# Patient Record
Sex: FEMALE | Race: WHITE | HISPANIC OR LATINO | URBAN - METROPOLITAN AREA
[De-identification: names, ages, dates, MRNs, and addresses within clinical notes are randomized per-mention and may not be internally consistent; named-entity substitution may affect disease eponyms.]

---

## 2018-02-21 ENCOUNTER — EMERGENCY (EMERGENCY)
Facility: HOSPITAL | Age: 28
LOS: 0 days | Discharge: HOME | End: 2018-02-21

## 2018-02-21 VITALS
RESPIRATION RATE: 20 BRPM | SYSTOLIC BLOOD PRESSURE: 123 MMHG | HEART RATE: 120 BPM | TEMPERATURE: 103 F | DIASTOLIC BLOOD PRESSURE: 82 MMHG | OXYGEN SATURATION: 98 %

## 2018-02-21 VITALS
DIASTOLIC BLOOD PRESSURE: 59 MMHG | HEART RATE: 95 BPM | RESPIRATION RATE: 18 BRPM | OXYGEN SATURATION: 98 % | SYSTOLIC BLOOD PRESSURE: 125 MMHG | TEMPERATURE: 99 F

## 2018-02-21 DIAGNOSIS — Z88.0 ALLERGY STATUS TO PENICILLIN: ICD-10-CM

## 2018-02-21 DIAGNOSIS — J45.909 UNSPECIFIED ASTHMA, UNCOMPLICATED: ICD-10-CM

## 2018-02-21 DIAGNOSIS — R50.9 FEVER, UNSPECIFIED: ICD-10-CM

## 2018-02-21 DIAGNOSIS — R05 COUGH: ICD-10-CM

## 2018-02-21 DIAGNOSIS — M79.1 MYALGIA: ICD-10-CM

## 2018-02-21 DIAGNOSIS — R19.7 DIARRHEA, UNSPECIFIED: ICD-10-CM

## 2018-02-21 DIAGNOSIS — Z91.018 ALLERGY TO OTHER FOODS: ICD-10-CM

## 2018-02-21 DIAGNOSIS — R11.2 NAUSEA WITH VOMITING, UNSPECIFIED: ICD-10-CM

## 2018-02-21 DIAGNOSIS — J02.9 ACUTE PHARYNGITIS, UNSPECIFIED: ICD-10-CM

## 2018-02-21 RX ORDER — SODIUM CHLORIDE 9 MG/ML
2000 INJECTION INTRAMUSCULAR; INTRAVENOUS; SUBCUTANEOUS ONCE
Qty: 0 | Refills: 0 | Status: COMPLETED | OUTPATIENT
Start: 2018-02-21 | End: 2018-02-21

## 2018-02-21 RX ORDER — ACETAMINOPHEN 500 MG
650 TABLET ORAL ONCE
Qty: 0 | Refills: 0 | Status: COMPLETED | OUTPATIENT
Start: 2018-02-21 | End: 2018-02-21

## 2018-02-21 RX ORDER — KETOROLAC TROMETHAMINE 30 MG/ML
30 SYRINGE (ML) INJECTION ONCE
Qty: 0 | Refills: 0 | Status: DISCONTINUED | OUTPATIENT
Start: 2018-02-21 | End: 2018-02-21

## 2018-02-21 RX ADMIN — Medication 30 MILLIGRAM(S): at 21:09

## 2018-02-21 RX ADMIN — SODIUM CHLORIDE 2000 MILLILITER(S): 9 INJECTION INTRAMUSCULAR; INTRAVENOUS; SUBCUTANEOUS at 21:09

## 2018-02-21 RX ADMIN — Medication 650 MILLIGRAM(S): at 20:36

## 2018-02-21 NOTE — ED PROVIDER NOTE - MEDICAL DECISION MAKING DETAILS
pt with influenza like symptoms, tamiflu offered and pt refused, close follow up discussed, pt improved with treatment in the ED

## 2018-02-21 NOTE — ED PROVIDER NOTE - OBJECTIVE STATEMENT
Pertinent PMH/PSH/FHx/SHx and Review of Systems contained within:  Patient presents to the ED for evaluation of generalized body aches, fever, cough, sore throat that started today. Also admits to nausea, vomiting or diarrhea. No abd pain, no sick contact, no recent travel.   Patient denies EtOH/tobacco/illicit substance use.  Relevant PMHx/SHx/SOCHx/FAMH:  Asthma  Review of Systems  Constitutional: (+) fever  Cardiovascular: (-) chest pain  Respiratory: (-) cough  Gastrointestinal: (+) vomiting, (+) diarrhea  Integumentary: (-) rash  Neurological: (+) headache  PE  Gen: Alert, NAD, well appearing  Head: NC, AT, PERRL, EOMI, normal lids/conjunctiva  ENT: normal hearing, patent oropharynx mild erythema no exuadate no LAD  Neck: +supple, no meningismus  Pulm: Bilateral BS, normal resp effort, no wheeze/stridor/retractions  CV: RRR, no M/R/G, +dist pulses  Abd: soft, NT/ND, +BS, no hepatosplenomegaly

## 2018-05-22 ENCOUNTER — APPOINTMENT (OUTPATIENT)
Dept: OBGYN | Facility: CLINIC | Age: 28
End: 2018-05-22

## 2018-11-01 NOTE — ED ADULT TRIAGE NOTE - SOURCE OF INFORMATION
Patient was seen by Danielle Munoz last Friday 10/26/18. She is still coughing and feels like theres tightness in her chest. Wondering if something could be called in. Please advise. Luis Angel Camara. Patient/EMS

## 2019-01-05 ENCOUNTER — EMERGENCY (EMERGENCY)
Facility: HOSPITAL | Age: 29
LOS: 0 days | Discharge: HOME | End: 2019-01-05
Attending: EMERGENCY MEDICINE | Admitting: PHYSICIAN ASSISTANT

## 2019-01-05 VITALS
HEART RATE: 68 BPM | DIASTOLIC BLOOD PRESSURE: 87 MMHG | TEMPERATURE: 98 F | SYSTOLIC BLOOD PRESSURE: 130 MMHG | RESPIRATION RATE: 20 BRPM | OXYGEN SATURATION: 99 %

## 2019-01-05 DIAGNOSIS — K08.89 OTHER SPECIFIED DISORDERS OF TEETH AND SUPPORTING STRUCTURES: ICD-10-CM

## 2019-01-05 DIAGNOSIS — F17.290 NICOTINE DEPENDENCE, OTHER TOBACCO PRODUCT, UNCOMPLICATED: ICD-10-CM

## 2019-01-05 RX ORDER — IBUPROFEN 200 MG
600 TABLET ORAL ONCE
Qty: 0 | Refills: 0 | Status: COMPLETED | OUTPATIENT
Start: 2019-01-05 | End: 2019-01-05

## 2019-01-05 RX ORDER — IBUPROFEN 200 MG
1 TABLET ORAL
Qty: 21 | Refills: 0
Start: 2019-01-05 | End: 2019-01-11

## 2019-01-05 RX ADMIN — Medication 600 MILLIGRAM(S): at 09:49

## 2019-01-05 NOTE — ED PROVIDER NOTE - OBJECTIVE STATEMENT
27 y/o female presents to the ED c/o "I have severe left upper dental pain since last night. My tooth cracked awhile ago." no fever/ chills/ difficulty swallowing.

## 2019-01-05 NOTE — ED ADULT NURSE NOTE - OBJECTIVE STATEMENT
Pt states she broke her right upper tooth months ago but did not have pain. Pt states she was eating sandwich last night and has been having pain since.

## 2019-01-05 NOTE — CONSULT NOTE ADULT - SUBJECTIVE AND OBJECTIVE BOX
Patient reports delaying dental treatment as she is afraid of needles. Patient had been aware her teeth were fractured but since they did not cause pain she delayed seeking dental care.

## 2019-01-05 NOTE — CONSULT NOTE ADULT - ASSESSMENT
Reviewed medical history with patient. Patient reports penicillin allergy with resulting full body rash.    Clinical exam: Patient have generalized calculus, visible cavitation on teeth #19 and #30, and fractured tooth #15 remaining lingual cusp visible.   Emergency Plan: extraction of tooth #15    Procedure: Explained to patient that tooth #15 requires extraction, but could not be done in the emergency room. Explained that as it was before 10 am there would be open dental clinics and advised patient to seek dental care today. Assisted patient in navigating her insurance website and locating a dentist that accepts her insurance. Explained to patient that teeth #19 and #30 despite being visibly cavitated may be saved with root canal treatment pending radiographs. Advised patient to seek routine care as she has several dental needs that should be addressed. Explained risks of delaying dental care would result in infections manifesting as intraoral abscesses and visible extra oral swellings. Administered 3 carpules of 2% lidocaine 1:100,000 epinephrine via buccal and palatal local infiltration.      Prescriptions: Clindamycin 150 mg 4 times per day for 7 days

## 2019-01-05 NOTE — ED ADULT NURSE NOTE - NSIMPLEMENTINTERV_GEN_ALL_ED
Implemented All Universal Safety Interventions:  [place] to call system. Call bell, personal items and telephone within reach. Instruct patient to call for assistance. Room bathroom lighting operational. Non-slip footwear when patient is off stretcher. Physically safe environment: no spills, clutter or unnecessary equipment. Stretcher in lowest position, wheels locked, appropriate side rails in place.

## 2019-10-14 ENCOUNTER — TRANSCRIPTION ENCOUNTER (OUTPATIENT)
Age: 29
End: 2019-10-14

## 2019-12-13 ENCOUNTER — EMERGENCY (EMERGENCY)
Facility: HOSPITAL | Age: 29
LOS: 0 days | Discharge: HOME | End: 2019-12-13
Admitting: EMERGENCY MEDICINE
Payer: MEDICAID

## 2019-12-13 VITALS
HEART RATE: 97 BPM | TEMPERATURE: 99 F | DIASTOLIC BLOOD PRESSURE: 66 MMHG | RESPIRATION RATE: 16 BRPM | SYSTOLIC BLOOD PRESSURE: 146 MMHG | OXYGEN SATURATION: 99 %

## 2019-12-13 DIAGNOSIS — L02.91 CUTANEOUS ABSCESS, UNSPECIFIED: ICD-10-CM

## 2019-12-13 DIAGNOSIS — Z91.018 ALLERGY TO OTHER FOODS: ICD-10-CM

## 2019-12-13 DIAGNOSIS — K61.1 RECTAL ABSCESS: ICD-10-CM

## 2019-12-13 DIAGNOSIS — Z88.0 ALLERGY STATUS TO PENICILLIN: ICD-10-CM

## 2019-12-13 PROCEDURE — 99283 EMERGENCY DEPT VISIT LOW MDM: CPT | Mod: 25

## 2019-12-13 PROCEDURE — 46040 I&D ISCHIORCT&/PERIRCT ABSC: CPT

## 2019-12-13 RX ORDER — CEPHALEXIN 500 MG
1 CAPSULE ORAL
Qty: 28 | Refills: 0
Start: 2019-12-13 | End: 2019-12-19

## 2019-12-13 RX ORDER — AZTREONAM 2 G
1 VIAL (EA) INJECTION
Qty: 14 | Refills: 0
Start: 2019-12-13 | End: 2019-12-19

## 2019-12-13 NOTE — ED PROVIDER NOTE - CARE PROVIDER_API CALL
Pepe Wood)  Dermatology; Internal Medicine  [address] [place] [address]  Phone: [phone]  Fax: [phone]  Follow Up Time:

## 2019-12-13 NOTE — ED PROVIDER NOTE - OBJECTIVE STATEMENT
29 year old F c/o abscess to rectal area x 2 days. Denies any fever/chills, drainage, constipation, diarrhea, rectal bleeding, abdominal pain, ,nausea, vomiting.

## 2019-12-13 NOTE — ED PROVIDER NOTE - PHYSICAL EXAMINATION
CONSTITUTIONAL: Well-appearing; well-nourished; in no apparent distress.   NECK: Supple; non-tender; no cervical lymphadenopathy.    CARDIOVASCULAR: Normal S1, S2; no murmurs, rubs, or gallops.   RESPIRATORY: Normal chest excursion with respiration; breath sounds clear and equal bilaterally; no wheezes, rhonchi, or rales.  GI/: Normal bowel sounds; non-distended; non-tender; no palpable organomegaly.   Exam chaperoned by DIANA Bedoya: + small fluctuant perirectal abscess noted at 9 o clock. no drainage . Pt refused rectal exam  MS: No evidence of trauma or deformity. Normal ROM in all four extremities; non-tender to palpation; distal pulses are normal.   SKIN: Normal for age and race; warm; dry; good turgor; no apparent lesions or exudate.   NEURO/PSYCH: A & O x 4; grossly unremarkable. mood and manner are appropriate.

## 2019-12-13 NOTE — ED PROVIDER NOTE - NS ED ROS FT
Constitutional: no fever, chills, no recent weight loss, change in appetite or malaise  Cardiac: No chest pain, SOB or edema.  Respiratory: No cough or respiratory distress  GI: No nausea, vomiting, diarrhea or abdominal pain  : No dysuria, frequency, urgency or hematuria. + perirectal abscess  MS: no pain to back or extremities, no loss of ROM, no weakness  Neuro: No headache or weakness. No LOC.  Skin: No skin rash.  Endocrine: No history of thyroid disease or diabetes.  Except as documented in the HPI, all other systems are negative.

## 2019-12-13 NOTE — ED PROVIDER NOTE - PATIENT PORTAL LINK FT
You can access the FollowMyHealth Patient Portal offered by [facility] by registering at the following website: [email or web address]. By joining [employer]’s FollowMyHealth portal, you will also be able to view your health information using other applications (apps) compatible with our system.

## 2020-11-20 ENCOUNTER — APPOINTMENT (OUTPATIENT)
Dept: SURGERY | Facility: CLINIC | Age: 30
End: 2020-11-20
Payer: MEDICAID

## 2020-11-20 VITALS
BODY MASS INDEX: 41.48 KG/M2 | SYSTOLIC BLOOD PRESSURE: 142 MMHG | TEMPERATURE: 97.5 F | HEIGHT: 70.5 IN | DIASTOLIC BLOOD PRESSURE: 86 MMHG | WEIGHT: 293 LBS

## 2020-11-20 DIAGNOSIS — Z82.49 FAMILY HISTORY OF ISCHEMIC HEART DISEASE AND OTHER DISEASES OF THE CIRCULATORY SYSTEM: ICD-10-CM

## 2020-11-20 PROCEDURE — 99204 OFFICE O/P NEW MOD 45 MIN: CPT

## 2020-11-20 RX ORDER — ETONOGESTREL 68 MG/1
IMPLANT SUBCUTANEOUS
Refills: 0 | Status: ACTIVE | COMMUNITY

## 2020-11-20 RX ORDER — ETONOGESTREL 68 MG/1
IMPLANT SUBCUTANEOUS
Refills: 0 | Status: DISCONTINUED | COMMUNITY
End: 2020-11-20

## 2020-11-20 RX ORDER — CALCIUM CARBONATE 500 MG/1
TABLET, CHEWABLE ORAL
Refills: 0 | Status: DISCONTINUED | COMMUNITY
End: 2020-11-20

## 2020-11-20 RX ORDER — CALCIUM CARBONATE 500 MG/1
TABLET, CHEWABLE ORAL
Refills: 0 | Status: ACTIVE | COMMUNITY

## 2020-11-23 NOTE — ASSESSMENT
[FreeTextEntry1] : BRIAN JOHNSTON is a 30 year female seen today for Bariatric Consultation. The surgical options for weight loss have been extensively discussed with the patient and questions answered. The patient was provided written and verbal education regarding the Sleeve Gastrectomy. The patient appears to have a reasonable understanding of the process and is ready to proceed.  Risks, including but not limited to:  anesthesia, death, bleeding, infection, leaks, blood clots, ulcers, malnutrition and need for additional operations have been reviewed.  The importance of a consistent diet and exercise regimen in regards to weight loss and maintenance has also been discussed and the patient agrees to actively participate in the process.  The importance of lifelong mineral and vitamin supplementation was also reviewed with the patient. The need to adhere to an appropriate diet and exercise regimen were emphasized; in particular the need to perform moderate to intense physical activity most days of the week.  No promises have been made in regards to any given outcome and possibility of inadequate weight loss as well as regain has been discussed with the patient.  The patient understands that a long-term commitment is necessary for long-term success.\par

## 2020-11-23 NOTE — PHYSICAL EXAM
[Normal] : affect appropriate [de-identified] : Mallampati III [de-identified] : Soft, nondistended.

## 2020-11-23 NOTE — CONSULT LETTER
[Dear  ___] : Dear  [unfilled], [Courtesy Letter:] : I had the pleasure of seeing your patient, [unfilled], in my office today. [Please see my note below.] : Please see my note below. [Consult Closing:] : Thank you very much for allowing me to participate in the care of this patient.  If you have any questions, please do not hesitate to contact me. [Sincerely,] : Sincerely, [FreeTextEntry3] : Eun Martinez MD, FACS, FASMBS, Diplo ABOM\par Bariatric, Foregut & Minimally Invasive Surgery\par Associate Medical Director, Quality\par [facility] \par [facility]\par

## 2020-11-23 NOTE — PLAN
[FreeTextEntry1] : Plan: Education class.\par          Nutrition evaluation.\par          Call with concerns.

## 2020-11-23 NOTE — HISTORY OF PRESENT ILLNESS
[de-identified] : 30 year old female with a BMI of 50 who presents today for Bariatric Consultation in consideration for the Sleeve Gastrectomy. She has tried multiple weight loss modalities in the past such as portion control, Herbalife/Shakeology and Intermittent Fasting without any long term success.She has been obese for several years and is seeking surgery for improvement of her overall health, comorbid conditions and to mitigate the impact of future medical comorbidities. Her biggest concern is that she will not be around to care for her 3 children.She enjoyed exercise in the past and is trying to dedicate sometime 2 days/week to walk for 15 minutes. Discussed the importance of regular scheduled exercise to optimize weight loss. \par \par \par

## 2020-11-30 ENCOUNTER — RESULT REVIEW (OUTPATIENT)
Age: 30
End: 2020-11-30

## 2020-11-30 ENCOUNTER — OUTPATIENT (OUTPATIENT)
Dept: OUTPATIENT SERVICES | Facility: HOSPITAL | Age: 30
LOS: 1 days | Discharge: HOME | End: 2020-11-30
Payer: MEDICAID

## 2020-11-30 DIAGNOSIS — E66.9 OBESITY, UNSPECIFIED: ICD-10-CM

## 2020-11-30 PROCEDURE — 76700 US EXAM ABDOM COMPLETE: CPT | Mod: 26

## 2020-12-02 ENCOUNTER — APPOINTMENT (OUTPATIENT)
Dept: SURGERY | Facility: CLINIC | Age: 30
End: 2020-12-02

## 2020-12-07 ENCOUNTER — NON-APPOINTMENT (OUTPATIENT)
Age: 30
End: 2020-12-07

## 2020-12-07 DIAGNOSIS — E55.9 VITAMIN D DEFICIENCY, UNSPECIFIED: ICD-10-CM

## 2020-12-07 LAB
25(OH)D3 SERPL-MCNC: 15 NG/ML
ALBUMIN SERPL ELPH-MCNC: 4 G/DL
ALP BLD-CCNC: 70 U/L
ALT SERPL-CCNC: 33 U/L
ANION GAP SERPL CALC-SCNC: 19 MMOL/L
AST SERPL-CCNC: 20 U/L
BASOPHILS # BLD AUTO: 0.03 K/UL
BASOPHILS NFR BLD AUTO: 0.4 %
BILIRUB SERPL-MCNC: 0.3 MG/DL
BUN SERPL-MCNC: 13 MG/DL
CALCIUM SERPL-MCNC: 8.8 MG/DL
CALCIUM SERPL-MCNC: 9 MG/DL
CHLORIDE SERPL-SCNC: 103 MMOL/L
CHOLEST SERPL-MCNC: 175 MG/DL
CO2 SERPL-SCNC: 18 MMOL/L
CREAT SERPL-MCNC: 0.8 MG/DL
EOSINOPHIL # BLD AUTO: 0.09 K/UL
EOSINOPHIL NFR BLD AUTO: 1.2 %
ESTIMATED AVERAGE GLUCOSE: 154 MG/DL
FERRITIN SERPL-MCNC: 65 NG/ML
FOLATE RBC-MCNC: 782 NG/ML
GLUCOSE SERPL-MCNC: 137 MG/DL
HBA1C MFR BLD HPLC: 7 %
HCT VFR BLD CALC: 37.6 %
HCT VFR BLD CALC: 37.6 %
HDLC SERPL-MCNC: 47 MG/DL
HGB BLD-MCNC: 12.5 G/DL
IMM GRANULOCYTES NFR BLD AUTO: 0.3 %
IRON SATN MFR SERPL: 15 %
IRON SERPL-MCNC: 52 UG/DL
LDLC SERPL CALC-MCNC: 113 MG/DL
LYMPHOCYTES # BLD AUTO: 2.61 K/UL
LYMPHOCYTES NFR BLD AUTO: 35.9 %
MAN DIFF?: NORMAL
MCHC RBC-ENTMCNC: 27.7 PG
MCHC RBC-ENTMCNC: 33.2 G/DL
MCV RBC AUTO: 83.4 FL
MONOCYTES # BLD AUTO: 0.38 K/UL
MONOCYTES NFR BLD AUTO: 5.2 %
NEUTROPHILS # BLD AUTO: 4.15 K/UL
NEUTROPHILS NFR BLD AUTO: 57 %
NONHDLC SERPL-MCNC: 128 MG/DL
PARATHYROID HORMONE INTACT: 32 PG/ML
PLATELET # BLD AUTO: 223 K/UL
POTASSIUM SERPL-SCNC: 4.3 MMOL/L
PROT SERPL-MCNC: 7.4 G/DL
RBC # BLD: 4.51 M/UL
RBC # FLD: 13.4 %
SODIUM SERPL-SCNC: 140 MMOL/L
T3FREE SERPL-MCNC: 3.72 PG/ML
T4 FREE SERPL-MCNC: 1.1 NG/DL
TIBC SERPL-MCNC: 340 UG/DL
TRIGL SERPL-MCNC: 102 MG/DL
TSH SERPL-ACNC: 3.2 UIU/ML
UIBC SERPL-MCNC: 288 UG/DL
VIT B1 SERPL-MCNC: 115.5 NMOL/L
VIT B12 SERPL-MCNC: 336 PG/ML
WBC # FLD AUTO: 7.28 K/UL

## 2020-12-09 ENCOUNTER — APPOINTMENT (OUTPATIENT)
Dept: SURGERY | Facility: CLINIC | Age: 30
End: 2020-12-09

## 2020-12-16 ENCOUNTER — APPOINTMENT (OUTPATIENT)
Dept: SURGERY | Facility: CLINIC | Age: 30
End: 2020-12-16
Payer: SELF-PAY

## 2020-12-16 PROCEDURE — SI006: CPT | Mod: NC

## 2020-12-17 ENCOUNTER — APPOINTMENT (OUTPATIENT)
Dept: SURGERY | Facility: CLINIC | Age: 30
End: 2020-12-17

## 2021-01-04 ENCOUNTER — TRANSCRIPTION ENCOUNTER (OUTPATIENT)
Age: 31
End: 2021-01-04

## 2021-01-12 ENCOUNTER — OUTPATIENT (OUTPATIENT)
Dept: OUTPATIENT SERVICES | Facility: HOSPITAL | Age: 31
LOS: 1 days | Discharge: HOME | End: 2021-01-12

## 2021-01-12 DIAGNOSIS — F50.9 EATING DISORDER, UNSPECIFIED: ICD-10-CM

## 2021-01-14 DIAGNOSIS — F50.9 EATING DISORDER, UNSPECIFIED: ICD-10-CM

## 2021-01-27 ENCOUNTER — APPOINTMENT (OUTPATIENT)
Dept: SURGERY | Facility: CLINIC | Age: 31
End: 2021-01-27
Payer: MEDICAID

## 2021-01-27 VITALS — WEIGHT: 293 LBS | BODY MASS INDEX: 41.48 KG/M2 | HEIGHT: 70.5 IN

## 2021-01-27 PROCEDURE — ZZZZZ: CPT

## 2021-02-16 ENCOUNTER — APPOINTMENT (OUTPATIENT)
Dept: SURGERY | Facility: CLINIC | Age: 31
End: 2021-02-16
Payer: MEDICAID

## 2021-02-16 VITALS — HEIGHT: 70.5 IN

## 2021-02-16 PROCEDURE — ZZZZZ: CPT

## 2021-02-23 ENCOUNTER — NON-APPOINTMENT (OUTPATIENT)
Age: 31
End: 2021-02-23

## 2021-03-29 ENCOUNTER — APPOINTMENT (OUTPATIENT)
Dept: SURGERY | Facility: CLINIC | Age: 31
End: 2021-03-29
Payer: MEDICAID

## 2021-03-29 VITALS — WEIGHT: 293 LBS | BODY MASS INDEX: 41.48 KG/M2 | HEIGHT: 70.5 IN

## 2021-03-29 PROCEDURE — ZZZZZ: CPT

## 2021-04-12 ENCOUNTER — TRANSCRIPTION ENCOUNTER (OUTPATIENT)
Age: 31
End: 2021-04-12

## 2021-04-26 ENCOUNTER — APPOINTMENT (OUTPATIENT)
Dept: SURGERY | Facility: CLINIC | Age: 31
End: 2021-04-26
Payer: MEDICAID

## 2021-04-26 VITALS — HEIGHT: 70.5 IN | BODY MASS INDEX: 41.48 KG/M2 | WEIGHT: 293 LBS

## 2021-04-26 PROCEDURE — ZZZZZ: CPT

## 2021-04-30 ENCOUNTER — NON-APPOINTMENT (OUTPATIENT)
Age: 31
End: 2021-04-30

## 2021-05-01 ENCOUNTER — OUTPATIENT (OUTPATIENT)
Dept: OUTPATIENT SERVICES | Facility: HOSPITAL | Age: 31
LOS: 1 days | End: 2021-05-01
Payer: MEDICAID

## 2021-05-09 ENCOUNTER — EMERGENCY (EMERGENCY)
Facility: HOSPITAL | Age: 31
LOS: 0 days | Discharge: HOME | End: 2021-05-10
Attending: EMERGENCY MEDICINE | Admitting: EMERGENCY MEDICINE
Payer: MEDICAID

## 2021-05-09 VITALS
OXYGEN SATURATION: 96 % | DIASTOLIC BLOOD PRESSURE: 75 MMHG | RESPIRATION RATE: 20 BRPM | SYSTOLIC BLOOD PRESSURE: 144 MMHG | WEIGHT: 293 LBS | TEMPERATURE: 97 F | HEART RATE: 84 BPM

## 2021-05-09 VITALS
HEART RATE: 85 BPM | DIASTOLIC BLOOD PRESSURE: 60 MMHG | OXYGEN SATURATION: 98 % | TEMPERATURE: 99 F | SYSTOLIC BLOOD PRESSURE: 109 MMHG | RESPIRATION RATE: 20 BRPM

## 2021-05-09 DIAGNOSIS — Z88.0 ALLERGY STATUS TO PENICILLIN: ICD-10-CM

## 2021-05-09 DIAGNOSIS — J45.909 UNSPECIFIED ASTHMA, UNCOMPLICATED: ICD-10-CM

## 2021-05-09 DIAGNOSIS — L02.31 CUTANEOUS ABSCESS OF BUTTOCK: ICD-10-CM

## 2021-05-09 DIAGNOSIS — Z91.018 ALLERGY TO OTHER FOODS: ICD-10-CM

## 2021-05-09 PROCEDURE — 99283 EMERGENCY DEPT VISIT LOW MDM: CPT

## 2021-05-09 NOTE — ED PROVIDER NOTE - OBJECTIVE STATEMENT
31 year old female with pmhx of abscesses presents with swelling to right buttock x 3 months. pt denies fever or chills .

## 2021-05-09 NOTE — ED PROVIDER NOTE - ATTENDING CONTRIBUTION TO CARE
32 y/o female with h/o abscesses in ER with c/o 'lump' to R buttocks for the past 3 months.  has not been getting larger, no drainage from site.  no f/c.   + painful to sit on. normal bm/urination.  no vaginal pain/abnormal discharge.  no abd pain.  no n/v/d.  no cp/sob  PE - nad, nc/at, cta b/l, no w/r/r, abd - soft, nt/nd, nabs, gu - normal ext genitalia, R buttock with ~ 1cm fluctuant mass palpated, no overlying erythema, no drainage.  -I&D

## 2021-05-09 NOTE — ED PROVIDER NOTE - NS ED ROS FT
Review of Systems:  	•	CONSTITUTIONAL - no fever, no diaphoresis, no chills  	•	SKIN - no rash  	•	HEMATOLOGIC - no bleeding, no bruising  	•	CARDIAC - no chest pain, no palpitations  	•	GI - no abd pain, no nausea, no vomiting, no diarrhea, no constipation  	•	GENITO-URINARY - no discharge, no dysuria; no hematuria, no increased urinary frequency  	•	MUSCULOSKELETAL - no joint paint, no swelling, no redness

## 2021-05-09 NOTE — ED PROVIDER NOTE - PHYSICAL EXAMINATION
CONST: Well appearing in NAD  EYES: PERRL, EOMI, Sclera and conjunctiva clear.   CARD: Normal S1 S2; Normal rate and rhythm  RESP: Equal BS B/L, No wheezes, rhonchi or rales. No distress  GI: Soft, non-tender, non-distended. no cva tenderness. normal BS  MS: Normal ROM in all extremities.   SKIN: small coin shaped area of fluctuance to right inner buttock, Warm, dry, no acute rashes. Good turgor

## 2021-05-09 NOTE — ED PROVIDER NOTE - NSFOLLOWUPCLINICS_GEN_ALL_ED_FT
[facility]  OB/GYN  [address] [place] [address]  Phone: [phone]  Fax:     [facility]  Surgery  [address] [place] [address]  Phone: [phone]  Fax:

## 2021-05-09 NOTE — ED PROVIDER NOTE - CLINICAL SUMMARY MEDICAL DECISION MAKING FREE TEXT BOX
I&D attempted, but only bloody fluid expressed.  to try warm compresses, po abx, and pt to f/u with surgery as outpt.

## 2021-05-09 NOTE — ED PROVIDER NOTE - PATIENT PORTAL LINK FT
You can access the FollowMyHealth Patient Portal offered by [facility] by registering at the following website: [email or web address]. By joining [employer]’s FollowMyHealth portal, you will also be able to view your health information using other applications (apps) compatible with our system.

## 2021-05-12 ENCOUNTER — APPOINTMENT (OUTPATIENT)
Dept: SURGERY | Facility: CLINIC | Age: 31
End: 2021-05-12
Payer: MEDICAID

## 2021-05-12 DIAGNOSIS — Z71.89 OTHER SPECIFIED COUNSELING: ICD-10-CM

## 2021-05-12 PROCEDURE — 99212 OFFICE O/P EST SF 10 MIN: CPT | Mod: 95

## 2021-05-12 NOTE — HISTORY OF PRESENT ILLNESS
[Home] : at home, [unfilled] , at the time of the visit. [Medical Office: ([facility])___] : at the medical office located in  [Verbal consent obtained from patient] : the patient, [unfilled] [de-identified] : Patient currently in the process of preoperative assessment for Sleeve Gastrectomy.  Upon initial consult, patient did report that she did experience reflux in when she ate certain foods "with sauces".  Currently, she reports that since she changed her diet to follow the recommendations of the dietician, she has not experienced any heartburn/reflux.  Our encounter today is to discuss the results recently received from an EGD performed in Jan 2021.

## 2021-05-12 NOTE — ASSESSMENT
[FreeTextEntry1] : Results of the EGD biopsy discussed with patient.  The risks of a Sleeve potentially increasing the severity of reflux with potential Ruiz's and progression to cancer were discussed with the patient.  Esophagitis is not an absolute contraindication to proceeding with the Sleeve but my concerns were discussed with the patient.  She has not followed-up with Dr. Minor since the EGD and was not aware of these findings.  I suggested that she contact Dr. Minor and discuss the findings, we will also reach out to Dr. Minor.  At this time, the patient wishes to proceed with the Sleeve Gastrectomy.  After discussing with Dr. Minor, we will f/u again to finalize our plan.  Patient voiced understanding and agreed.

## 2021-05-12 NOTE — PHYSICAL EXAM
[Obese, well nourished, in no acute distress] : obese, well nourished, in no acute distress [Normal] : affect appropriate [de-identified] : No increased respiratory effort

## 2021-06-04 ENCOUNTER — NON-APPOINTMENT (OUTPATIENT)
Age: 31
End: 2021-06-04

## 2021-06-04 ENCOUNTER — OUTPATIENT (OUTPATIENT)
Dept: OUTPATIENT SERVICES | Facility: HOSPITAL | Age: 31
LOS: 1 days | Discharge: HOME | End: 2021-06-04
Payer: MEDICAID

## 2021-06-04 VITALS
OXYGEN SATURATION: 98 % | SYSTOLIC BLOOD PRESSURE: 136 MMHG | RESPIRATION RATE: 18 BRPM | HEIGHT: 72 IN | TEMPERATURE: 98 F | HEART RATE: 89 BPM | WEIGHT: 293 LBS | DIASTOLIC BLOOD PRESSURE: 82 MMHG

## 2021-06-04 DIAGNOSIS — Z01.818 ENCOUNTER FOR OTHER PREPROCEDURAL EXAMINATION: ICD-10-CM

## 2021-06-04 DIAGNOSIS — E66.01 MORBID (SEVERE) OBESITY DUE TO EXCESS CALORIES: ICD-10-CM

## 2021-06-04 DIAGNOSIS — Z98.890 OTHER SPECIFIED POSTPROCEDURAL STATES: Chronic | ICD-10-CM

## 2021-06-04 LAB
A1C WITH ESTIMATED AVERAGE GLUCOSE RESULT: 7.6 % — HIGH (ref 4–5.6)
ALBUMIN SERPL ELPH-MCNC: 4 G/DL — SIGNIFICANT CHANGE UP (ref 3.5–5.2)
ALP SERPL-CCNC: 77 U/L — SIGNIFICANT CHANGE UP (ref 30–115)
ALT FLD-CCNC: 29 U/L — SIGNIFICANT CHANGE UP (ref 0–41)
ANION GAP SERPL CALC-SCNC: 11 MMOL/L — SIGNIFICANT CHANGE UP (ref 7–14)
APTT BLD: 31.7 SEC — SIGNIFICANT CHANGE UP (ref 27–39.2)
AST SERPL-CCNC: 22 U/L — SIGNIFICANT CHANGE UP (ref 0–41)
BASOPHILS # BLD AUTO: 0.03 K/UL — SIGNIFICANT CHANGE UP (ref 0–0.2)
BASOPHILS NFR BLD AUTO: 0.3 % — SIGNIFICANT CHANGE UP (ref 0–1)
BILIRUB SERPL-MCNC: 0.5 MG/DL — SIGNIFICANT CHANGE UP (ref 0.2–1.2)
BLD GP AB SCN SERPL QL: SIGNIFICANT CHANGE UP
BUN SERPL-MCNC: 12 MG/DL — SIGNIFICANT CHANGE UP (ref 10–20)
CALCIUM SERPL-MCNC: 8.9 MG/DL — SIGNIFICANT CHANGE UP (ref 8.5–10.1)
CHLORIDE SERPL-SCNC: 102 MMOL/L — SIGNIFICANT CHANGE UP (ref 98–110)
CO2 SERPL-SCNC: 26 MMOL/L — SIGNIFICANT CHANGE UP (ref 17–32)
CREAT SERPL-MCNC: 0.7 MG/DL — SIGNIFICANT CHANGE UP (ref 0.7–1.5)
EOSINOPHIL # BLD AUTO: 0.11 K/UL — SIGNIFICANT CHANGE UP (ref 0–0.7)
EOSINOPHIL NFR BLD AUTO: 1 % — SIGNIFICANT CHANGE UP (ref 0–8)
ESTIMATED AVERAGE GLUCOSE: 171 MG/DL — HIGH (ref 68–114)
GLUCOSE SERPL-MCNC: 142 MG/DL — HIGH (ref 70–99)
HCT VFR BLD CALC: 38.8 % — SIGNIFICANT CHANGE UP (ref 37–47)
HGB BLD-MCNC: 12.7 G/DL — SIGNIFICANT CHANGE UP (ref 12–16)
IMM GRANULOCYTES NFR BLD AUTO: 0.3 % — SIGNIFICANT CHANGE UP (ref 0.1–0.3)
INR BLD: 1.05 RATIO — SIGNIFICANT CHANGE UP (ref 0.65–1.3)
LYMPHOCYTES # BLD AUTO: 3.23 K/UL — SIGNIFICANT CHANGE UP (ref 1.2–3.4)
LYMPHOCYTES # BLD AUTO: 30 % — SIGNIFICANT CHANGE UP (ref 20.5–51.1)
MCHC RBC-ENTMCNC: 26.7 PG — LOW (ref 27–31)
MCHC RBC-ENTMCNC: 32.7 G/DL — SIGNIFICANT CHANGE UP (ref 32–37)
MCV RBC AUTO: 81.7 FL — SIGNIFICANT CHANGE UP (ref 81–99)
MONOCYTES # BLD AUTO: 0.57 K/UL — SIGNIFICANT CHANGE UP (ref 0.1–0.6)
MONOCYTES NFR BLD AUTO: 5.3 % — SIGNIFICANT CHANGE UP (ref 1.7–9.3)
NEUTROPHILS # BLD AUTO: 6.8 K/UL — HIGH (ref 1.4–6.5)
NEUTROPHILS NFR BLD AUTO: 63.1 % — SIGNIFICANT CHANGE UP (ref 42.2–75.2)
NRBC # BLD: 0 /100 WBCS — SIGNIFICANT CHANGE UP (ref 0–0)
PLATELET # BLD AUTO: 222 K/UL — SIGNIFICANT CHANGE UP (ref 130–400)
POTASSIUM SERPL-MCNC: 3.9 MMOL/L — SIGNIFICANT CHANGE UP (ref 3.5–5)
POTASSIUM SERPL-SCNC: 3.9 MMOL/L — SIGNIFICANT CHANGE UP (ref 3.5–5)
PROT SERPL-MCNC: 6.8 G/DL — SIGNIFICANT CHANGE UP (ref 6–8)
PROTHROM AB SERPL-ACNC: 12.1 SEC — SIGNIFICANT CHANGE UP (ref 9.95–12.87)
RBC # BLD: 4.75 M/UL — SIGNIFICANT CHANGE UP (ref 4.2–5.4)
RBC # FLD: 13.8 % — SIGNIFICANT CHANGE UP (ref 11.5–14.5)
SODIUM SERPL-SCNC: 139 MMOL/L — SIGNIFICANT CHANGE UP (ref 135–146)
WBC # BLD: 10.77 K/UL — SIGNIFICANT CHANGE UP (ref 4.8–10.8)
WBC # FLD AUTO: 10.77 K/UL — SIGNIFICANT CHANGE UP (ref 4.8–10.8)

## 2021-06-04 PROCEDURE — 93010 ELECTROCARDIOGRAM REPORT: CPT

## 2021-06-04 NOTE — H&P PST ADULT - HISTORY OF PRESENT ILLNESS
Pt denies cp palp uri cough dysuria or sob. ET: 1 FOS OR 1 FLAT BLOCK - DENIES  SOB .   PT denies any open wounds, drainage or rashes. denies hx of covid-denies recent cough fever or infection. aware to self quaerantine preop. all instructions reviewed.    scheduled for lap sleeve gastrectomy possible open possible intraoperative endoscopy. hx obesity and GERD  -witH RYLAND HX ON CPAP. ADVISED DOP   ADVISED F/U WITH PMD REGARDING IRREGULAR MENSES AND VAGINAL BLEEDING R/T TRANSDERMAL CONTRACEPTIVE DEPRO PROVERA- INSERTED 3 YEARS AGO BY PLANNED PARENT BAUGH    PT COMPLIANT- DENIES GYN F/U    Patient verbalized understanding of instructions and was given the opportunity to ask questions and have them answered.  Patient denies any signs or symptoms of COVID 19 and denies contact with known positive individuals.  They have an appointment for repeat COVID testing pre-procedure and acknowledge its time and place.  They were instructed to quarantine pre-procedure, practice exposure control measures, continue to self-monitor and report any concerns to their proceduralist.    Anesthesia Alert  yes--Difficult Airway  IV  NO--History of neck surgery or radiation  NO--Limited ROM of neck  NO--History of Malignant hyperthermia  NO--Personal or family history of Pseudocholinesterase deficiency.  NO--Prior Anesthesia Complication  NO--Latex Allergy  NO--Loose teeth  NO--History of Rheumatoid Arthritis  yes--RYLAND  on CPAP  NO--Bleeding risk  YES--____BMI 50   Pt denies cp palp uri cough dysuria or sob. ET: 1 FOS OR 1 FLAT BLOCK - DENIES  SOB .   PT denies any open wounds, drainage or rashes. denies hx of covid-denies recent cough fever or infection. aware to self quaerantine preop. all instructions reviewed.    scheduled for lap sleeve gastrectomy possible open possible intraoperative endoscopy. hx morbid obesity BMI 50  and hx GERD  -hx RYLAND with  CPAP. ADVISED DOP   ADVISED F/U WITH PMD REGARDING IRREGULAR MENSES x many month AND VAGINAL breakthrough  BLEEDING R/T TRANSDERMAL CONTRACEPTIVE DEPO PROVERA- INSERTED 3 YEARS AGO BY PLANNED [facility]    PT COMPLIANT- DENIES GYN F/U currently.   pt has clearance appointment scheduled with pmd next week.     Patient verbalized understanding of instructions and was given the opportunity to ask questions and have them answered.  Patient denies any signs or symptoms of COVID 19 and denies contact with known positive individuals.  They have an appointment for repeat COVID testing pre-procedure and acknowledge its time and place.  They were instructed to quarantine pre-procedure, practice exposure control measures, continue to self-monitor and report any concerns to their proceduralist.    Anesthesia Alert  yes--Difficult Airway  IV  NO--History of neck surgery or radiation  NO--Limited ROM of neck  NO--History of Malignant hyperthermia  NO--Personal or family history of Pseudocholinesterase deficiency.  NO--Prior Anesthesia Complication  NO--Latex Allergy  NO--Loose teeth  NO--History of Rheumatoid Arthritis  yes--RYLAND  on CPAP  NO--Bleeding risk  YES--____BMI 50   Pt denies cp palp uri cough dysuria or sob. ET: 1 FOS OR 1 FLAT BLOCK - DENIES  SOB .   PT denies any open wounds, drainage or rashes. denies hx of covid-denies recent cough fever or infection. aware to self quaerantine preop. all instructions reviewed.    scheduled for lap sleeve gastrectomy possible open possible intraoperative endoscopy  6/22/21 . hx morbid obesity BMI 50  and hx GERD  -hx RYLAND with  CPAP. ADVISED DOP   ADVISED F/U WITH PMD REGARDING IRREGULAR MENSES x many month AND VAGINAL breakthrough  BLEEDING R/T TRANSDERMAL CONTRACEPTIVE DEPO PROVERA- INSERTED 3 YEARS AGO BY PLANNED [facility]    PT COMPLIANT- DENIES GYN F/U currently.   pt has clearance appointment scheduled with pmd next week.     Patient verbalized understanding of instructions and was given the opportunity to ask questions and have them answered.  Patient denies any signs or symptoms of COVID 19 and denies contact with known positive individuals.  They have an appointment for repeat COVID testing pre-procedure and acknowledge its time and place.  They were instructed to quarantine pre-procedure, practice exposure control measures, continue to self-monitor and report any concerns to their proceduralist.    Anesthesia Alert  yes--Difficult Airway  IV  NO--History of neck surgery or radiation  NO--Limited ROM of neck  NO--History of Malignant hyperthermia  NO--Personal or family history of Pseudocholinesterase deficiency.  NO--Prior Anesthesia Complication  NO--Latex Allergy  NO--Loose teeth  NO--History of Rheumatoid Arthritis  yes--RYLAND  on CPAP  NO--Bleeding risk  YES--____BMI 50

## 2021-06-04 NOTE — H&P PST ADULT - NSICDXPASTMEDICALHX_GEN_ALL_CORE_FT
PAST MEDICAL HISTORY:  Asthma     Encounter for surveillance of transdermal patch hormonal contraceptive device     GERD (gastroesophageal reflux disease)     Obesity BMI 50    RYLAND on CPAP

## 2021-06-08 RX ORDER — ENOXAPARIN SODIUM 100 MG/ML
40 INJECTION SUBCUTANEOUS DAILY
Qty: 30 | Refills: 0 | Status: COMPLETED | COMMUNITY
Start: 2021-06-08 | End: 2021-07-09

## 2021-06-08 RX ORDER — OMEPRAZOLE 40 MG/1
40 CAPSULE, DELAYED RELEASE ORAL
Qty: 30 | Refills: 4 | Status: COMPLETED | COMMUNITY
Start: 2021-05-26 | End: 2021-10-28

## 2021-06-08 RX ORDER — ERGOCALCIFEROL 1.25 MG/1
1.25 MG CAPSULE, LIQUID FILLED ORAL
Qty: 4 | Refills: 3 | Status: DISCONTINUED | COMMUNITY
Start: 2020-12-07 | End: 2021-06-08

## 2021-06-11 ENCOUNTER — APPOINTMENT (OUTPATIENT)
Dept: SURGERY | Facility: CLINIC | Age: 31
End: 2021-06-11
Payer: MEDICAID

## 2021-06-11 VITALS
TEMPERATURE: 97.9 F | BODY MASS INDEX: 41.48 KG/M2 | WEIGHT: 293 LBS | HEIGHT: 70.5 IN | HEART RATE: 80 BPM | DIASTOLIC BLOOD PRESSURE: 78 MMHG | SYSTOLIC BLOOD PRESSURE: 126 MMHG

## 2021-06-11 DIAGNOSIS — R06.00 DYSPNEA, UNSPECIFIED: ICD-10-CM

## 2021-06-11 DIAGNOSIS — N92.6 IRREGULAR MENSTRUATION, UNSPECIFIED: ICD-10-CM

## 2021-06-11 PROCEDURE — 99215 OFFICE O/P EST HI 40 MIN: CPT

## 2021-06-17 NOTE — ASSESSMENT
[FreeTextEntry1] : BRIAN JOHNSTON is a 31 year female seen today for preoperative bariatric follow up visit. Dietary guidelines, vitamins and minerals as well as protein supplementation were reviewed with patient. Her  A1c was 7.6 % and she was only given 1 Ensure Pre Surgery drink with hand written instructions. Patient will need extended DVT prophylaxis because of her BMI and we prescribed Lovenox 40 mg daily. She was given a mock demonstration in administration.  All medications were reviewed with patient and instructions were given in respect to medications to take on the day of surgery as well as postoperatively.  Written instructions and materials were provided.  All questions were answered.\par

## 2021-06-17 NOTE — REASON FOR VISIT
[Follow-Up Visit] : a follow-up visit for [Morbid Obesity (BMI>40)] : morbid obesity (bmi>40) [FreeTextEntry2] : Patient presents for Preoperative Bariatric visit

## 2021-06-17 NOTE — HISTORY OF PRESENT ILLNESS
[de-identified] : BRIAN JOHNSTON underwent extensive preoperative workup and is scheduled to have Laparoscopic Sleeve Gastrectomy on 6/22/2021. Patient will benefit from surgery to improve her quality of life and for management of her comorbid conditions.\par At this preoperative visit, we discussed the risks, benefits and alternatives to weight loss surgery. We specifically discussed the risks of anesthesia including cardiac and pulmonary complications, DVT/PE, pneumonia, leaks, infection, death, bleeding, ulcers, and need for additional operations have been reviewed. We discussed the importance of a consistent diet and exercise regimen in regards to weight loss and maintenance as well. The importance of lifelong mineral and vitamin supplementation was also reviewed with the patient. The patient was given ample opportunity to ask questions and all questions were answered.\par

## 2021-06-17 NOTE — PLAN
[FreeTextEntry1] : Plan: Proceed to surgery on 6/22/2021.\par          RTO for postop visit.\par          Call with concerns.\par

## 2021-06-19 ENCOUNTER — LABORATORY RESULT (OUTPATIENT)
Age: 31
End: 2021-06-19

## 2021-06-19 ENCOUNTER — OUTPATIENT (OUTPATIENT)
Dept: OUTPATIENT SERVICES | Facility: HOSPITAL | Age: 31
LOS: 1 days | Discharge: HOME | End: 2021-06-19

## 2021-06-19 DIAGNOSIS — Z98.890 OTHER SPECIFIED POSTPROCEDURAL STATES: Chronic | ICD-10-CM

## 2021-06-19 DIAGNOSIS — Z11.59 ENCOUNTER FOR SCREENING FOR OTHER VIRAL DISEASES: ICD-10-CM

## 2021-06-19 PROBLEM — K21.9 GASTRO-ESOPHAGEAL REFLUX DISEASE WITHOUT ESOPHAGITIS: Chronic | Status: ACTIVE | Noted: 2021-06-04

## 2021-06-19 PROBLEM — E66.9 OBESITY, UNSPECIFIED: Chronic | Status: ACTIVE | Noted: 2021-06-04

## 2021-06-19 PROBLEM — Z30.45 ENCOUNTER FOR SURVEILLANCE OF TRANSDERMAL PATCH HORMONAL CONTRACEPTIVE DEVICE: Chronic | Status: ACTIVE | Noted: 2021-06-04

## 2021-06-22 ENCOUNTER — RESULT REVIEW (OUTPATIENT)
Age: 31
End: 2021-06-22

## 2021-06-22 ENCOUNTER — INPATIENT (INPATIENT)
Facility: HOSPITAL | Age: 31
LOS: 1 days | Discharge: HOME | End: 2021-06-24
Attending: SURGERY | Admitting: SURGERY
Payer: MEDICAID

## 2021-06-22 ENCOUNTER — APPOINTMENT (OUTPATIENT)
Dept: SURGERY | Facility: HOSPITAL | Age: 31
End: 2021-06-22
Payer: MEDICAID

## 2021-06-22 VITALS
HEIGHT: 72 IN | SYSTOLIC BLOOD PRESSURE: 133 MMHG | RESPIRATION RATE: 14 BRPM | HEART RATE: 83 BPM | OXYGEN SATURATION: 97 % | WEIGHT: 293 LBS | DIASTOLIC BLOOD PRESSURE: 75 MMHG | TEMPERATURE: 98 F

## 2021-06-22 DIAGNOSIS — Z98.890 OTHER SPECIFIED POSTPROCEDURAL STATES: Chronic | ICD-10-CM

## 2021-06-22 LAB — GLUCOSE BLDC GLUCOMTR-MCNC: 167 MG/DL — HIGH (ref 70–99)

## 2021-06-22 PROCEDURE — 88305 TISSUE EXAM BY PATHOLOGIST: CPT | Mod: 26

## 2021-06-22 PROCEDURE — 99291 CRITICAL CARE FIRST HOUR: CPT | Mod: 25

## 2021-06-22 PROCEDURE — 43775 LAP SLEEVE GASTRECTOMY: CPT

## 2021-06-22 PROCEDURE — 43775 LAP SLEEVE GASTRECTOMY: CPT | Mod: 82

## 2021-06-22 PROCEDURE — 71045 X-RAY EXAM CHEST 1 VIEW: CPT | Mod: 26

## 2021-06-22 RX ORDER — ACETAMINOPHEN 500 MG
1000 TABLET ORAL ONCE
Refills: 0 | Status: COMPLETED | OUTPATIENT
Start: 2021-06-23 | End: 2021-06-23

## 2021-06-22 RX ORDER — HYDROMORPHONE HYDROCHLORIDE 2 MG/ML
0.5 INJECTION INTRAMUSCULAR; INTRAVENOUS; SUBCUTANEOUS
Refills: 0 | Status: DISCONTINUED | OUTPATIENT
Start: 2021-06-22 | End: 2021-06-22

## 2021-06-22 RX ORDER — HEPARIN SODIUM 5000 [USP'U]/ML
5000 INJECTION INTRAVENOUS; SUBCUTANEOUS ONCE
Refills: 0 | Status: COMPLETED | OUTPATIENT
Start: 2021-06-22 | End: 2021-06-22

## 2021-06-22 RX ORDER — GABAPENTIN 400 MG/1
125 CAPSULE ORAL THREE TIMES A DAY
Refills: 0 | Status: DISCONTINUED | OUTPATIENT
Start: 2021-06-23 | End: 2021-06-24

## 2021-06-22 RX ORDER — ONDANSETRON 8 MG/1
4 TABLET, FILM COATED ORAL ONCE
Refills: 0 | Status: COMPLETED | OUTPATIENT
Start: 2021-06-22 | End: 2021-06-22

## 2021-06-22 RX ORDER — ALBUTEROL 90 UG/1
0 AEROSOL, METERED ORAL
Qty: 0 | Refills: 0 | DISCHARGE

## 2021-06-22 RX ORDER — ACETAMINOPHEN 500 MG
1000 TABLET ORAL ONCE
Refills: 0 | Status: COMPLETED | OUTPATIENT
Start: 2021-06-22 | End: 2021-06-22

## 2021-06-22 RX ORDER — ACETAMINOPHEN 500 MG
1000 TABLET ORAL ONCE
Refills: 0 | Status: DISCONTINUED | OUTPATIENT
Start: 2021-06-22 | End: 2021-06-22

## 2021-06-22 RX ORDER — ONDANSETRON 8 MG/1
4 TABLET, FILM COATED ORAL EVERY 6 HOURS
Refills: 0 | Status: DISCONTINUED | OUTPATIENT
Start: 2021-06-22 | End: 2021-06-24

## 2021-06-22 RX ORDER — PANTOPRAZOLE SODIUM 20 MG/1
40 TABLET, DELAYED RELEASE ORAL DAILY
Refills: 0 | Status: DISCONTINUED | OUTPATIENT
Start: 2021-06-22 | End: 2021-06-24

## 2021-06-22 RX ORDER — HEPARIN SODIUM 5000 [USP'U]/ML
5000 INJECTION INTRAVENOUS; SUBCUTANEOUS EVERY 8 HOURS
Refills: 0 | Status: DISCONTINUED | OUTPATIENT
Start: 2021-06-22 | End: 2021-06-24

## 2021-06-22 RX ORDER — BUPIVACAINE 13.3 MG/ML
20 INJECTION, SUSPENSION, LIPOSOMAL INFILTRATION ONCE
Refills: 0 | Status: DISCONTINUED | OUTPATIENT
Start: 2021-06-22 | End: 2021-06-22

## 2021-06-22 RX ORDER — PROCHLORPERAZINE MALEATE 5 MG
5 TABLET ORAL EVERY 6 HOURS
Refills: 0 | Status: DISCONTINUED | OUTPATIENT
Start: 2021-06-22 | End: 2021-06-24

## 2021-06-22 RX ORDER — HYDROMORPHONE HYDROCHLORIDE 2 MG/ML
1 INJECTION INTRAMUSCULAR; INTRAVENOUS; SUBCUTANEOUS
Refills: 0 | Status: DISCONTINUED | OUTPATIENT
Start: 2021-06-22 | End: 2021-06-22

## 2021-06-22 RX ORDER — SODIUM CHLORIDE 9 MG/ML
1000 INJECTION, SOLUTION INTRAVENOUS
Refills: 0 | Status: DISCONTINUED | OUTPATIENT
Start: 2021-06-22 | End: 2021-06-22

## 2021-06-22 RX ORDER — SODIUM CHLORIDE 9 MG/ML
1000 INJECTION, SOLUTION INTRAVENOUS
Refills: 0 | Status: DISCONTINUED | OUTPATIENT
Start: 2021-06-22 | End: 2021-06-24

## 2021-06-22 RX ORDER — MEPERIDINE HYDROCHLORIDE 50 MG/ML
12.5 INJECTION INTRAMUSCULAR; INTRAVENOUS; SUBCUTANEOUS
Refills: 0 | Status: DISCONTINUED | OUTPATIENT
Start: 2021-06-22 | End: 2021-06-22

## 2021-06-22 RX ORDER — KETOROLAC TROMETHAMINE 30 MG/ML
15 SYRINGE (ML) INJECTION EVERY 6 HOURS
Refills: 0 | Status: DISCONTINUED | OUTPATIENT
Start: 2021-06-22 | End: 2021-06-24

## 2021-06-22 RX ADMIN — Medication 400 MILLIGRAM(S): at 22:55

## 2021-06-22 RX ADMIN — HEPARIN SODIUM 5000 UNIT(S): 5000 INJECTION INTRAVENOUS; SUBCUTANEOUS at 08:34

## 2021-06-22 RX ADMIN — Medication 100 MILLIGRAM(S): at 16:37

## 2021-06-22 RX ADMIN — Medication 400 MILLIGRAM(S): at 17:04

## 2021-06-22 RX ADMIN — Medication 15 MILLIGRAM(S): at 17:04

## 2021-06-22 RX ADMIN — HYDROMORPHONE HYDROCHLORIDE 1 MILLIGRAM(S): 2 INJECTION INTRAMUSCULAR; INTRAVENOUS; SUBCUTANEOUS at 11:33

## 2021-06-22 RX ADMIN — Medication 5 MILLIGRAM(S): at 12:13

## 2021-06-22 RX ADMIN — Medication 1000 MILLIGRAM(S): at 18:41

## 2021-06-22 RX ADMIN — ONDANSETRON 4 MILLIGRAM(S): 8 TABLET, FILM COATED ORAL at 11:33

## 2021-06-22 RX ADMIN — HYDROMORPHONE HYDROCHLORIDE 0.5 MILLIGRAM(S): 2 INJECTION INTRAMUSCULAR; INTRAVENOUS; SUBCUTANEOUS at 12:28

## 2021-06-22 RX ADMIN — HYDROMORPHONE HYDROCHLORIDE 1 MILLIGRAM(S): 2 INJECTION INTRAMUSCULAR; INTRAVENOUS; SUBCUTANEOUS at 11:43

## 2021-06-22 RX ADMIN — HYDROMORPHONE HYDROCHLORIDE 0.5 MILLIGRAM(S): 2 INJECTION INTRAMUSCULAR; INTRAVENOUS; SUBCUTANEOUS at 12:13

## 2021-06-22 RX ADMIN — Medication 100 MILLIGRAM(S): at 22:55

## 2021-06-22 RX ADMIN — Medication 15 MILLIGRAM(S): at 15:21

## 2021-06-22 RX ADMIN — HEPARIN SODIUM 5000 UNIT(S): 5000 INJECTION INTRAVENOUS; SUBCUTANEOUS at 15:21

## 2021-06-22 RX ADMIN — HEPARIN SODIUM 5000 UNIT(S): 5000 INJECTION INTRAVENOUS; SUBCUTANEOUS at 22:55

## 2021-06-22 RX ADMIN — PANTOPRAZOLE SODIUM 40 MILLIGRAM(S): 20 TABLET, DELAYED RELEASE ORAL at 15:22

## 2021-06-22 NOTE — ASU PATIENT PROFILE, ADULT - PMH
Asthma  Last asthma attack as a child  Encounter for surveillance of transdermal patch hormonal contraceptive device    GERD (gastroesophageal reflux disease)    Obesity  BMI 50  RYLAND on CPAP

## 2021-06-22 NOTE — CONSULT NOTE ADULT - ASSESSMENT
Assessment & Plan  31 year old obese female with BMI of 50 is s/p laparoscopic sleeve gastrectomy with ALLYN and bilateral TAP block. SICU consulted for hemodynamic monitoring    NEURO: no chronic disease  Pain:   -IV Tylenol   -Toradol   -Dilaudid for severe pain    -Gabapentin to start 6/23    RESP: history of RYLAND on CPAP and childhood asthma  Monitor oxygen saturation  Saturating well on room air  RYLAND:   -CPAP bedside, encourage use in PM  Encourage incentive spirometry 10x/hr  Daily AM CXR    CARDS: no chronic disease   Maintain normotensive  Monitor for tachycardia   Monitor EKG for QTC prolongation if receiving antiemetics  Post op EKG: pending  No home medications to restart     GI/NUTR: history of GERD and obesity with BMI 50  Diet: Bariatric clear liquid diet  GI prophylaxis: PPI  Bowel regimen: holding  Nausea/vomiting: zofran, compazine   -Monitor QTC    /RENAL: no chronic disease  Monitor urine output  Follow up TOV post operatively   LR@100   Labs:          BUN/Cr-          Electrolytes-  Monitor labs and replete prn    HEME/ONC: no chronic disease  DVT prophylaxis: HSQ, SCDS  Monitor Labs:               Hb/Hct:                       Plts:                  PTT/INR:                  T&S:    ID: no chronic disease  Monitor for signs of infection  WBC:   Temp trend- 24hrs T(F): 97.8 (06-22 @ 11:05), Max: 98.2 (06-22 @ 07:55)  Antibiotics: clindamycin 900 q8 -- PCN allergy    ENDO: no chronic disease  Monitor Glucose  FS    LINES/DRAINS:  PIV    DISPO:    SICU discussed with Dr. Montenegro

## 2021-06-22 NOTE — CONSULT NOTE ADULT - ATTENDING COMMENTS
I examined the patient with the PA/resident and discussed my plan with the Resident/PA.  I personally provided over 30 minutes of direct critical care to this patient. I agree with the above resident/pa note unless directly contradicted below.     BRIAN JOHNSTON 31y Female BMI 51 s/p elective laparoscopic sleeve gastrectomy with b/l TAP block with no intraop complications.      PLAN:    NEUROLOGIC:   #Post op Pain   - controlled with IV Tylenol, Gabapentin, Toradol    RESPIRATORY:   #morbid obesity hypoventilation RYLAND requiring CPAP   - bedside set at 10 cm H20    CARDIOVASCULAR:   # high risk for hemodynamic deterioration due to morbid obesity s/p gastric surgery   - continue cardiac monitor x 24 hours     GASTROINTESTIONAL/NUTRITION:     #s/p gastric surgery   Diet, NPO  GI ppx: PPI  Bowel regimen once taking po meds    RENAL/GENITOURINARY:   No Curtis, pending post-op void, will bladder scan if necessary  Cr at baseline  F/u BMP , CBC     DVT ppx: HSQ    # Periop ABX  -- Ancef x2 doses      ENDOCRINE:   #type 2 DM   - FS q4 while in ICU      DISPOSITION: Admit to SICU for hemodynamic monitoring s/p gastric surgery

## 2021-06-22 NOTE — BRIEF OPERATIVE NOTE - NSICDXBRIEFPROCEDURE_GEN_ALL_CORE_FT
PROCEDURES:  Laparoscopic sleeve gastrectomy 22-Jun-2021 11:06:57  Chela Engel  Laparoscopic lysis of abdominal adhesions 22-Jun-2021 11:07:05  Chela Engel  Block, transversus abdominis plane, bilateral 22-Jun-2021 11:07:15  Chela Engel

## 2021-06-22 NOTE — CHART NOTE - NSCHARTNOTEFT_GEN_A_CORE
Pt seen and examined @ bedside without acute complaints  Denies chest pain , shortness of breath , or vomiting     Vital Signs Last 24 Hrs  T(C): 36.6 (22 Jun 2021 15:00), Max: 36.8 (22 Jun 2021 07:55)  T(F): 97.9 (22 Jun 2021 15:00), Max: 98.2 (22 Jun 2021 07:55)  HR: 81 (22 Jun 2021 15:00) (69 - 85)  BP: 142/86 (22 Jun 2021 15:00) (133/75 - 162/100)  BP(mean): 107 (22 Jun 2021 15:00) (107 - 107)  RR: 20 (22 Jun 2021 15:00) (14 - 21)  SpO2: 100% (22 Jun 2021 13:00) (96% - 100%)    On examination pt in no acute distress  CV: [id number]  lungs: + air entry bilat  abd: soft  incisions c/d/i    ext: no calf tenderness  neuro alert and oriented    LABS:        A/P 32y/o Female s/p laparoscopic sleeve gastrectomy with lysis of adhesions and bilateral tap block POD# 0  Continue sicu management   strict I&O  f/u labs  Encourage incentive spirometry use  Continue close observation

## 2021-06-22 NOTE — ASU PREOP CHECKLIST - SELECT TESTS ORDERED
UPREG NEGATIVE 06/22/21/COVID-19 UPREG NEGATIVE 06/22/21, /POCT Blood Glucose/COVID-19 UPREG NEGATIVE 06/22/21, , MD Martinez aware that confirmation tube needed/POCT Blood Glucose/COVID-19

## 2021-06-22 NOTE — ASU PATIENT PROFILE, ADULT - TOBACCO USE
CWOCN consult. We will be seeing patient today.   I ordered a Clinitron Bed from [facility] [id number].   Never smoker

## 2021-06-22 NOTE — CONSULT NOTE ADULT - SUBJECTIVE AND OBJECTIVE BOX
SICU Consultation Note  ======================================================================================================  BRIAN JOHNSTON  MRN-[id number]    HPI:  31 year old female with PMHx of childhood asthma (last attack over 10 years ago), RYLAND on CPAP, GERD and obesity with BMI of 50 presents for elective bariatric surgery after multiple failed attempts to lose weight. Patient underwent a laparoscopic sleeve gastrectomy with bilateral TAP block. There were no apparent complications intraoperatively. Patient tolerated procedure well. Extubated in PACU without difficulty. Vitally stable. SICU consulted for hemodynamic monitoring.     Procedure: Laparoscopic sleeve gastrectomy with ALLYN and bilateral TAP block  OR Stats  OR Time: 1hr 17min        EBL: 5cc         IV Fluids: 2L            Blood Products: none               UOP: none     Findings: no apparent complications, portion of stomach removed  Medications: clindamycin and IV tylenol    PMH  PAST MEDICAL & SURGICAL HISTORY:  Childhood asthma   RYLAND on CPAP  GERD  Obesity with BMI 50     Home Meds:  Home Medications:  Multiple Vitamins oral capsule: 1 cap(s) orally once a day (22 Jun 2021 08:07)  omeprazole 40 mg oral delayed release capsule: 1 cap(s) orally once a day (22 Jun 2021 08:07)    Allergies  Carrots (Other)  Originally Entered as [ANAPHYLAXIS] reaction to [watermelon] (Unknown)  penicillin (Rash)    Current Medications:  acetaminophen  IVPB .. 1000 milliGRAM(s) IV Intermittent once  acetaminophen  IVPB .. 1000 milliGRAM(s) IV Intermittent once  clindamycin IVPB 900 milliGRAM(s) IV Intermittent every 8 hours  heparin   Injectable 5000 Unit(s) SubCutaneous every 8 hours  HYDROmorphone  Injectable 0.5 milliGRAM(s) IV Push every 10 minutes PRN Moderate Pain (4 - 6)  ketorolac   Injectable 15 milliGRAM(s) IV Push every 6 hours PRN Moderate Pain (4 - 6)  lactated ringers. 1000 milliLiter(s) (100 mL/Hr) IV Continuous <Continuous>  ondansetron Injectable 4 milliGRAM(s) IV Push every 6 hours PRN Nausea  pantoprazole  Injectable 40 milliGRAM(s) IV Push daily  prochlorperazine   Injectable 5 milliGRAM(s) IV Push every 6 hours PRN nausea/emesis    Advanced Directives: Presumed Full Code    VITAL SIGNS, INS/OUTS (Last 24hours):    ICU Vital Signs Last 24 Hrs  T(C): 36.6 (22 Jun 2021 11:05), Max: 36.8 (22 Jun 2021 07:55)  T(F): 97.8 (22 Jun 2021 11:05), Max: 98.2 (22 Jun 2021 07:55)  HR: 83 (22 Jun 2021 08:08) (83 - 83)  BP: 133/75 (22 Jun 2021 08:08) (133/75 - 133/75)  BP(mean): --  ABP: --  ABP(mean): --  RR: 14 (22 Jun 2021 08:08) (14 - 14)  SpO2: 97% (22 Jun 2021 07:55) (97% - 97%)    I&O's Summary      Height (cm): 182.9 (06-22-21)  Weight (kg): 170.1 (06-22-21)  BMI (kg/m2): 50.8 (06-22-21)  BSA (m2): 2.78 (06-22-21)    Physical Exam:   ---------------------------------------------------------------------------------------  GCS: 15     Exam: A&Ox3, no focal deficits    RESPIRATORY:  Normal expansion/effort  Saturating well    CARDIOVASCULAR:  S1/S2.  RRR  No peripheral edema    GASTROINTESTINAL:  Abdomen soft, mildly distended  Incisions are TTP  Incisions are clean, dry and intact    MUSCULOSKELETAL:  Extremities warm, pink, well-perfused.    DERM:  No skin breakdown     :   Exam: voiding    Tubes/Lines/Drains   ----------------------------------------------------------------------------------------------------------  [x] Peripheral IV    LABS: pending  --------------------------------------------------------------------------------------

## 2021-06-23 ENCOUNTER — TRANSCRIPTION ENCOUNTER (OUTPATIENT)
Age: 31
End: 2021-06-23

## 2021-06-23 LAB
ANION GAP SERPL CALC-SCNC: 10 MMOL/L — SIGNIFICANT CHANGE UP (ref 7–14)
BASOPHILS # BLD AUTO: 0.01 K/UL — SIGNIFICANT CHANGE UP (ref 0–0.2)
BASOPHILS NFR BLD AUTO: 0.1 % — SIGNIFICANT CHANGE UP (ref 0–1)
BUN SERPL-MCNC: 9 MG/DL — LOW (ref 10–20)
CALCIUM SERPL-MCNC: 8.6 MG/DL — SIGNIFICANT CHANGE UP (ref 8.5–10.1)
CHLORIDE SERPL-SCNC: 104 MMOL/L — SIGNIFICANT CHANGE UP (ref 98–110)
CO2 SERPL-SCNC: 26 MMOL/L — SIGNIFICANT CHANGE UP (ref 17–32)
COVID-19 SPIKE DOMAIN AB INTERP: POSITIVE
COVID-19 SPIKE DOMAIN ANTIBODY RESULT: >250 U/ML — HIGH
CREAT SERPL-MCNC: 0.7 MG/DL — SIGNIFICANT CHANGE UP (ref 0.7–1.5)
EOSINOPHIL # BLD AUTO: 0 K/UL — SIGNIFICANT CHANGE UP (ref 0–0.7)
EOSINOPHIL NFR BLD AUTO: 0 % — SIGNIFICANT CHANGE UP (ref 0–8)
GLUCOSE SERPL-MCNC: 156 MG/DL — HIGH (ref 70–99)
HCT VFR BLD CALC: 35.1 % — LOW (ref 37–47)
HGB BLD-MCNC: 11.4 G/DL — LOW (ref 12–16)
IMM GRANULOCYTES NFR BLD AUTO: 0.3 % — SIGNIFICANT CHANGE UP (ref 0.1–0.3)
LYMPHOCYTES # BLD AUTO: 1.66 K/UL — SIGNIFICANT CHANGE UP (ref 1.2–3.4)
LYMPHOCYTES # BLD AUTO: 16 % — LOW (ref 20.5–51.1)
MAGNESIUM SERPL-MCNC: 1.7 MG/DL — LOW (ref 1.8–2.4)
MCHC RBC-ENTMCNC: 27 PG — SIGNIFICANT CHANGE UP (ref 27–31)
MCHC RBC-ENTMCNC: 32.5 G/DL — SIGNIFICANT CHANGE UP (ref 32–37)
MCV RBC AUTO: 83.2 FL — SIGNIFICANT CHANGE UP (ref 81–99)
MONOCYTES # BLD AUTO: 0.57 K/UL — SIGNIFICANT CHANGE UP (ref 0.1–0.6)
MONOCYTES NFR BLD AUTO: 5.5 % — SIGNIFICANT CHANGE UP (ref 1.7–9.3)
NEUTROPHILS # BLD AUTO: 8.13 K/UL — HIGH (ref 1.4–6.5)
NEUTROPHILS NFR BLD AUTO: 78.1 % — HIGH (ref 42.2–75.2)
NRBC # BLD: 0 /100 WBCS — SIGNIFICANT CHANGE UP (ref 0–0)
PHOSPHATE SERPL-MCNC: 3.9 MG/DL — SIGNIFICANT CHANGE UP (ref 2.1–4.9)
PLATELET # BLD AUTO: 238 K/UL — SIGNIFICANT CHANGE UP (ref 130–400)
POTASSIUM SERPL-MCNC: 4.3 MMOL/L — SIGNIFICANT CHANGE UP (ref 3.5–5)
POTASSIUM SERPL-SCNC: 4.3 MMOL/L — SIGNIFICANT CHANGE UP (ref 3.5–5)
RBC # BLD: 4.22 M/UL — SIGNIFICANT CHANGE UP (ref 4.2–5.4)
RBC # FLD: 13.8 % — SIGNIFICANT CHANGE UP (ref 11.5–14.5)
SARS-COV-2 IGG+IGM SERPL QL IA: >250 U/ML — HIGH
SARS-COV-2 IGG+IGM SERPL QL IA: POSITIVE
SODIUM SERPL-SCNC: 140 MMOL/L — SIGNIFICANT CHANGE UP (ref 135–146)
SURGICAL PATHOLOGY STUDY: SIGNIFICANT CHANGE UP
WBC # BLD: 10.4 K/UL — SIGNIFICANT CHANGE UP (ref 4.8–10.8)
WBC # FLD AUTO: 10.4 K/UL — SIGNIFICANT CHANGE UP (ref 4.8–10.8)

## 2021-06-23 PROCEDURE — 99291 CRITICAL CARE FIRST HOUR: CPT | Mod: 24

## 2021-06-23 PROCEDURE — 93010 ELECTROCARDIOGRAM REPORT: CPT

## 2021-06-23 PROCEDURE — 71045 X-RAY EXAM CHEST 1 VIEW: CPT | Mod: 26

## 2021-06-23 RX ORDER — MAGNESIUM SULFATE 500 MG/ML
2 VIAL (ML) INJECTION ONCE
Refills: 0 | Status: COMPLETED | OUTPATIENT
Start: 2021-06-23 | End: 2021-06-23

## 2021-06-23 RX ORDER — SUCRALFATE 1 G
1 TABLET ORAL EVERY 6 HOURS
Refills: 0 | Status: COMPLETED | OUTPATIENT
Start: 2021-06-23 | End: 2021-06-23

## 2021-06-23 RX ADMIN — Medication 1000 MILLIGRAM(S): at 11:28

## 2021-06-23 RX ADMIN — Medication 15 MILLIGRAM(S): at 05:57

## 2021-06-23 RX ADMIN — ONDANSETRON 4 MILLIGRAM(S): 8 TABLET, FILM COATED ORAL at 03:58

## 2021-06-23 RX ADMIN — GABAPENTIN 125 MILLIGRAM(S): 400 CAPSULE ORAL at 06:14

## 2021-06-23 RX ADMIN — Medication 15 MILLIGRAM(S): at 10:14

## 2021-06-23 RX ADMIN — HEPARIN SODIUM 5000 UNIT(S): 5000 INJECTION INTRAVENOUS; SUBCUTANEOUS at 13:03

## 2021-06-23 RX ADMIN — GABAPENTIN 125 MILLIGRAM(S): 400 CAPSULE ORAL at 13:03

## 2021-06-23 RX ADMIN — Medication 400 MILLIGRAM(S): at 06:13

## 2021-06-23 RX ADMIN — HEPARIN SODIUM 5000 UNIT(S): 5000 INJECTION INTRAVENOUS; SUBCUTANEOUS at 21:39

## 2021-06-23 RX ADMIN — Medication 1 GRAM(S): at 17:07

## 2021-06-23 RX ADMIN — Medication 100 MILLIGRAM(S): at 06:12

## 2021-06-23 RX ADMIN — Medication 15 MILLIGRAM(S): at 18:14

## 2021-06-23 RX ADMIN — Medication 1 GRAM(S): at 10:46

## 2021-06-23 RX ADMIN — GABAPENTIN 125 MILLIGRAM(S): 400 CAPSULE ORAL at 21:39

## 2021-06-23 RX ADMIN — HEPARIN SODIUM 5000 UNIT(S): 5000 INJECTION INTRAVENOUS; SUBCUTANEOUS at 06:14

## 2021-06-23 RX ADMIN — Medication 400 MILLIGRAM(S): at 10:46

## 2021-06-23 RX ADMIN — PANTOPRAZOLE SODIUM 40 MILLIGRAM(S): 20 TABLET, DELAYED RELEASE ORAL at 11:28

## 2021-06-23 RX ADMIN — Medication 25 GRAM(S): at 02:41

## 2021-06-23 NOTE — DISCHARGE NOTE PROVIDER - CARE PROVIDER_API CALL
Eun Martinez)  Surgery  [address] [place] [address], [address] [place] [address]  Phone: [phone]  Fax: [phone]  Follow Up Time:

## 2021-06-23 NOTE — DISCHARGE NOTE PROVIDER - NSDCCPCAREPLAN_GEN_ALL_CORE_FT
PRINCIPAL DISCHARGE DIAGNOSIS  Diagnosis: Morbid obesity  Assessment and Plan of Treatment:        PRINCIPAL DISCHARGE DIAGNOSIS  Diagnosis: Morbid obesity  Assessment and Plan of Treatment: s/p laparoscopic sleeve gastrectomy.  -Please advance diet according to bariatric protocol.  -Please resume home medications/take medications as previously discussed/prescribed during your pre-operative appointments.  -Please call to follow-up with Dr. Martinez in 1 week or as previously scheduled.

## 2021-06-23 NOTE — PROGRESS NOTE ADULT - SUBJECTIVE AND OBJECTIVE BOX
GENERAL SURGERY PROGRESS NOTE    Patient: BRIAN JOHNSTON , 31y ([birth year])Female   MRN: [id number]  Location: [facility] 006 A  Visit: 06-22-21 Inpatient  Date: 06-23-21 @ 05:14      HD#2  POD #:06-22-21 (1d)    Events of past 24 hours: Patient seen and examined at bedside. Afebrile, VSS. c/o burping, no nausea or vomiting. no gas or bm. ambulating.     6/22  Night:  -voided 400cc  -tolerating sigrid phase I so far  -2g Mg repleted      PAST MEDICAL & SURGICAL HISTORY:  Asthma  Last asthma attack as a child    Obesity  BMI 50  RYLAND on CPAP  GERD (gastroesophageal reflux disease)  Encounter for surveillance of transdermal patch hormonal contraceptive device  History of esophagogastroduodenoscopy (EGD)        Vitals:   T(F): 99.2 (06-23-21 @ 04:23), Max: 99.2 (06-23-21 @ 04:23)  HR: 80 (06-23-21 @ 04:23)  BP: 126/59 (06-23-21 @ 04:23)  RR: 18 (06-23-21 @ 04:23)  SpO2: 100% (06-22-21 @ 13:00)      Diet, Clear Liquid:   Bariatric Clear Liquid (BARICLLIQ)      Fluids: lactated ringers.: Solution, 1000 milliLiter(s) infuse at 100 mL/Hr    Bowel Movement: : [] YES [x] NO  Flatus: : [] YES [x] NO    PHYSICAL EXAM:  General Appearance: NAD  HEENT: EOMI, sclera non-icteric.  Heart: RRR   Lungs: No increased work of breathing or accessory muscle use. Symmetric chest wall rise and fall.   Abdomen:  Obese Soft, mild tenderness, nondistended.   MSK/Extremities: Warm & well-perfused.   Skin: Warm, dry. No jaundice.   Incision/wound: healing well, dressings in place, clean, dry and intact    MEDICATIONS  (STANDING):  acetaminophen  IVPB .. 1000 milliGRAM(s) IV Intermittent once  acetaminophen  IVPB .. 1000 milliGRAM(s) IV Intermittent once  clindamycin IVPB 900 milliGRAM(s) IV Intermittent every 8 hours  gabapentin   Solution 125 milliGRAM(s) Oral three times a day  heparin   Injectable 5000 Unit(s) SubCutaneous every 8 hours  lactated ringers. 1000 milliLiter(s) (100 mL/Hr) IV Continuous <Continuous>  pantoprazole  Injectable 40 milliGRAM(s) IV Push daily    MEDICATIONS  (PRN):  ketorolac   Injectable 15 milliGRAM(s) IV Push every 6 hours PRN Moderate Pain (4 - 6)  ondansetron Injectable 4 milliGRAM(s) IV Push every 6 hours PRN Nausea  prochlorperazine   Injectable 5 milliGRAM(s) IV Push every 6 hours PRN nausea/emesis      DVT PROPHYLAXIS: heparin   Injectable 5000 Unit(s) SubCutaneous every 8 hours    GI PROPHYLAXIS: pantoprazole  Injectable 40 milliGRAM(s) IV Push daily    ANTICOAGULATION:   ANTIBIOTICS:  clindamycin IVPB 900 milliGRAM(s)            LAB/STUDIES:  Labs:  CAPILLARY BLOOD GLUCOSE  POCT Blood Glucose.: 167 mg/dL (22 Jun 2021 08:01)                          11.4   10.40 )-----------( 238      ( 23 Jun 2021 01:19 )             35.1       Auto Neutrophil %: 78.1 % (06-23-21 @ 01:19)  Auto Immature Granulocyte %: 0.3 % (06-23-21 @ 01:19)    06-23    140  |  104  |  9<L>  ----------------------------<  156<H>  4.3   |  26  |  0.7      Calcium, Total Serum: 8.6 mg/dL (06-23-21 @ 01:19)        ACCESS/ DEVICES:  [ x] Peripheral IV  [ ] Central Venous Line	[ ] R	[ ] L	[ ] IJ	[ ] Fem	[ ] SC	Placed:   [ ] Arterial Line		[ ] R	[ ] L	[ ] Fem	[ ] Rad	[ ] Ax	Placed:   [ ] PICC:					[ ] Mediport  [ ] Urinary Catheter,  Date Placed:   [ ] Chest tube: [ ] Right, [ ] Left  [ ] ALLI/Feliz Drains

## 2021-06-23 NOTE — DISCHARGE NOTE PROVIDER - NSDCCPTREATMENT_GEN_ALL_CORE_FT
PRINCIPAL PROCEDURE  Procedure: Laparoscopic sleeve gastrectomy  Findings and Treatment: lysis of abdominal adhesions, block transversus abdominis plane bilateral

## 2021-06-23 NOTE — DISCHARGE NOTE PROVIDER - HOSPITAL COURSE
31 year old female with PMHx of childhood asthma (last attack over 10 years ago), RYLAND on CPAP, GERD and obesity with BMI of 50 presents for elective bariatric surgery after multiple failed attempts to lose weight. Patient underwent a laparoscopic sleeve gastrectomy with bilateral TAP block.  Patient tolerated procedure well.   Post operatively pt upgraded to  SICU for hemodynamic monitoring.   pt treated medically and bariatric diet advanced as tolerated.  On 6/23/2021 pt without complaints, tolerated bariatric diet, voided and ambulated. vital signs stable and afebrile. pt doing well and discharged home   in stable condition. pt advised to follow up with Dr Martinez as scheduled. resume home medications and precaution per bariatric surgery   31 year old female with PMHx of childhood asthma (last attack over 10 years ago), RYLAND on CPAP, GERD and obesity with BMI of 50 presents for elective bariatric surgery after multiple failed attempts to lose weight. Patient underwent a laparoscopic sleeve gastrectomy with bilateral TAP block.  Patient tolerated procedure well.   Post operatively pt upgraded to  SICU for hemodynamic monitoring. Pt treated medically and bariatric diet advanced as tolerated. Pt without complaints, tolerated bariatric diet, voided and ambulated. Vital signs stable and afebrile. Patient stable for discharge home. Pt advised to follow up with Dr Martinez as scheduled. Resume home medications and precaution per bariatric surgery. 31 year old female with PMHx of childhood asthma (last attack over 10 years ago), RYLAND on CPAP, GERD and obesity with BMI of 50 presents for elective bariatric surgery after multiple failed attempts to lose weight. Patient underwent a laparoscopic sleeve gastrectomy with bilateral TAP block.  Patient tolerated procedure well.   Pt treated medically and bariatric diet advanced as tolerated. Pt without complaints, tolerated bariatric diet, voided and ambulated. Vital signs stable and afebrile. Patient stable for discharge home. Pt advised to follow up with Dr Martinez as scheduled. Resume home medications and precaution per bariatric surgery.

## 2021-06-23 NOTE — PROGRESS NOTE ADULT - ASSESSMENT
31y F S/P LAPAROSCOPIC SLEEVE GASTRECTOMY POD#1 HD#2 Patient seen and examined at bedside.  dyspepsia +  ambulating +  bm/g -/+   void +  Diet:  Diet, Clear Liquid:   Bariatric Clear Liquid (BARICLLIQ) (06-22-21 @ 11:27) [Active]  on her row second row    PLAN:  OOB to ambulate  Contiune pain control  DVT prophylaxis  Continue bariatric clears, advance as tolerated  Passed trial of void  Anticipate discharge later today if no tolerating  F/u ECG for QTc    Lines/Tubes: PIV, Midline, Central Line, A-Line, Chest tubes, Feliz/ALLI drains, Curtis Catheter.    BLUE TEAM SPECTRA [id number]

## 2021-06-23 NOTE — DISCHARGE NOTE PROVIDER - NSDCMRMEDTOKEN_GEN_ALL_CORE_FT
Multiple Vitamins oral capsule: 1 cap(s) orally once a day  omeprazole 40 mg oral delayed release capsule: 1 cap(s) orally once a day

## 2021-06-23 NOTE — PROGRESS NOTE ADULT - ASSESSMENT
Assessment & Plan  31 year old obese female with BMI of 50 is s/p laparoscopic sleeve gastrectomy with ALLYN and bilateral TAP block. SICU consulted for hemodynamic monitoring    NEURO: no chronic disease  Pain:   -IV Tylenol   -Toradol   -Dilaudid for severe pain    -Gabapentin to start 6/23    RESP: history of RYLAND on CPAP and childhood asthma  Monitor oxygen saturation  Saturating well on room air  RYLAND:   -CPAP bedside, encourage use in PM  Encourage incentive spirometry 10x/hr  Daily AM CXR    CARDS: no chronic disease   Maintain normotensive  Monitor for tachycardia   Monitor EKG for QTC prolongation if receiving antiemetics  Post op EKG: pending  No home medications to restart     GI/NUTR: history of GERD and obesity with BMI 50  Diet: Bariatric clear liquid diet, tolerating so far  GI prophylaxis: PPI  Bowel regimen: holding  Nausea/vomiting: zofran, compazine   -Monitor QTC    /RENAL: no chronic disease  Monitor urine output, -voided 400cc  LR@100   Labs:          BUN/Cr-          Electrolytes-  Monitor labs and replete prn    HEME/ONC: no chronic disease  DVT prophylaxis: HSQ, SCDS  Monitor Labs:               Hb/Hct:                       Plts:                  PTT/INR:                  T&S:    ID: no chronic disease  Monitor for signs of infection  WBC:   Temp trend- 24hrs T(F): 97.8 (06-22 @ 11:05), Max: 98.2 (06-22 @ 07:55)  Antibiotics: clindamycin 900 q8 -- PCN allergy    ENDO: no chronic disease  Monitor Glucose  FS    LINES/DRAINS:  PIV    DISPO:  Stepdown Unit       Assessment & Plan  31 year old obese female with BMI of 50 is s/p laparoscopic sleeve gastrectomy with ALLYN and bilateral TAP block. SICU consulted for hemodynamic monitoring    NEURO: no chronic disease  Pain:   -IV Tylenol   -Toradol   -Dilaudid for severe pain    -Gabapentin to start 6/23    RESP: history of RYLAND on CPAP and childhood asthma  Monitor oxygen saturation  Saturating well on room air  RYLAND:   -CPAP bedside, encourage use in PM  Encourage incentive spirometry 10x/hr  Daily AM CXR    CARDS: no chronic disease   Maintain normotensive  Monitor for tachycardia   Monitor EKG for QTC prolongation if receiving antiemetics  Post op EKG: pending  No home medications to restart     GI/NUTR: history of GERD and obesity with BMI 50  Diet: Bariatric clear liquid diet, tolerating so far  GI prophylaxis: PPI  Bowel regimen: holding  Nausea/vomiting: zofran, compazine   -Monitor QTC    /RENAL: no chronic disease  Monitor urine output, -voided 400cc  LR@100   Labs:          BUN/Cr- 9/0.7          Electrolytes- Na 140, K 4.3, IP 3.9, Mg 1.7  -2g Mg repleted  Monitor labs and replete prn    HEME/ONC: no chronic disease  DVT prophylaxis: HSQ, SCDS  Monitor Labs:               Hb/Hct:                       Plts:                  PTT/INR:                  T&S:    ID: no chronic disease  Monitor for signs of infection  WBC:   Temp trend- 24hrs T(F): 97.8 (06-22 @ 11:05), Max: 98.2 (06-22 @ 07:55)  Antibiotics: clindamycin 900 q8 -- PCN allergy    ENDO: no chronic disease  Monitor Glucose  FS    LINES/DRAINS:  PIV    DISPO:  Stepdown Unit       Assessment & Plan  31 year old obese female with BMI of 50 is s/p laparoscopic sleeve gastrectomy with ALLYN and bilateral TAP block. SICU consulted for hemodynamic monitoring    NEURO: no chronic disease  Pain:   -IV Tylenol   -Toradol   -Dilaudid for severe pain    -Gabapentin to start 6/23    RESP: history of RYLAND on CPAP and childhood asthma  Monitor oxygen saturation  Saturating well on room air  RYLAND:   -CPAP bedside, encourage use in PM  Encourage incentive spirometry 10x/hr  Daily AM CXR    CARDS: no chronic disease   Maintain normotensive  Monitor for tachycardia   Monitor EKG for QTC prolongation if receiving antiemetics  Post op EKG: pending  No home medications to restart     GI/NUTR: history of GERD and obesity with BMI 50  Diet: Bariatric clear liquid diet, tolerating so far  GI prophylaxis: PPI  Bowel regimen: holding  Nausea/vomiting: zofran, compazine   -Monitor QTC    /RENAL: no chronic disease  Monitor urine output, -voided 400cc  LR@100   Labs:          BUN/Cr- 9/0.7          Electrolytes- Na 140, K 4.3, IP 3.9, Mg 1.7  -2g Mg repleted  Monitor labs and replete prn    HEME/ONC: no chronic disease  DVT prophylaxis: HSQ, SCDS  Monitor Labs:               Hb/Hct:  11.4/35.4                     Plts:238                   ID: no chronic disease  Monitor for signs of infection  WBC: 10.4  Temp trend- 24hrs T(F):afebrile  Antibiotics: clindamycin 900 q8 -- PCN allergy    ENDO: no chronic disease  Monitor Glucose  FS    LINES/DRAINS:  PIV    DISPO:  Stepdown Unit

## 2021-06-23 NOTE — PROGRESS NOTE ADULT - SUBJECTIVE AND OBJECTIVE BOX
BRIAN REYNOLDSGO  [id number]  31y Female    Indication for ICU admission:  Admit Date:06-22-21  ICU Date:  OR Date:    Carrots (Other)  Originally Entered as [ANAPHYLAXIS] reaction to [watermelon] (Unknown)  penicillin (Rash)    PAST MEDICAL & SURGICAL HISTORY:  Asthma  Last asthma attack as a child    Obesity  BMI 50    RYLAND on CPAP    GERD (gastroesophageal reflux disease)    Encounter for surveillance of transdermal patch hormonal contraceptive device    History of esophagogastroduodenoscopy (EGD)      Home Medications:  Multiple Vitamins oral capsule: 1 cap(s) orally once a day (22 Jun 2021 08:07)  omeprazole 40 mg oral delayed release capsule: 1 cap(s) orally once a day (22 Jun 2021 08:07)        24HRS EVENT:  6/22  Night:  -voided 400cc  -tolerating sigrid phase I so far        DVT Prophylaxis: heparin   Injectable 5000 Unit(s) SubCutaneous every 8 hours      GI Prophylaxis:pantoprazole  Injectable 40 milliGRAM(s) IV Push daily      ***Tubes/Lines/Drains  ***  Peripheral IV  Arterial Line		                  Central Line                            Urinary Catheter		Indication: Strict I&O          [X] A ten-point review of systems was otherwise negative except as noted above.  [  ] Due to altered mental status/intubation, subjective information was not attained from the patient. History was obtained, to the extent possible, from review of the chart and collateral sources of information.       BRIAN REYNOLDSGO  [id number]  31y Female    Indication for ICU admission:  Admit Date:06-22-21  ICU Date:  OR Date:    Carrots (Other)  Originally Entered as [ANAPHYLAXIS] reaction to [watermelon] (Unknown)  penicillin (Rash)    PAST MEDICAL & SURGICAL HISTORY:  Asthma  Last asthma attack as a child    Obesity  BMI 50    RYLAND on CPAP    GERD (gastroesophageal reflux disease)    Encounter for surveillance of transdermal patch hormonal contraceptive device    History of esophagogastroduodenoscopy (EGD)      Home Medications:  Multiple Vitamins oral capsule: 1 cap(s) orally once a day (22 Jun 2021 08:07)  omeprazole 40 mg oral delayed release capsule: 1 cap(s) orally once a day (22 Jun 2021 08:07)        24HRS EVENT:  6/22  Night:  -voided 400cc  -tolerating sigrid phase I so far  -2g Mg repleted      DVT Prophylaxis: heparin   Injectable 5000 Unit(s) SubCutaneous every 8 hours      GI Prophylaxis:pantoprazole  Injectable 40 milliGRAM(s) IV Push daily      ***Tubes/Lines/Drains  ***  Peripheral IV  Arterial Line		                  Central Line                            Urinary Catheter		Indication: Strict I&O          [X] A ten-point review of systems was otherwise negative except as noted above.  [  ] Due to altered mental status/intubation, subjective information was not attained from the patient. History was obtained, to the extent possible, from review of the chart and collateral sources of information.       BRIAN JOHNSTON  [id number]  31y Female    Indication for ICU admission:  Admit Date:06-22-21  ICU Date:  OR Date:    Carrots (Other)  Originally Entered as [ANAPHYLAXIS] reaction to [watermelon] (Unknown)  penicillin (Rash)    PAST MEDICAL & SURGICAL HISTORY:  Asthma  Last asthma attack as a child    Obesity  BMI 50    RYLAND on CPAP    GERD (gastroesophageal reflux disease)    Encounter for surveillance of transdermal patch hormonal contraceptive device    History of esophagogastroduodenoscopy (EGD)      Home Medications:  Multiple Vitamins oral capsule: 1 cap(s) orally once a day (22 Jun 2021 08:07)  omeprazole 40 mg oral delayed release capsule: 1 cap(s) orally once a day (22 Jun 2021 08:07)        24HRS EVENT:  6/22  Night:  -voided 400cc  -tolerating sigrid phase I so far  -2g Mg repleted      DVT Prophylaxis: heparin   Injectable 5000 Unit(s) SubCutaneous every 8 hours      GI Prophylaxis:pantoprazole  Injectable 40 milliGRAM(s) IV Push daily      ***Tubes/Lines/Drains  ***  Peripheral IV  Arterial Line		                  Central Line                            Urinary Catheter		Indication: Strict I&O          [X] A ten-point review of systems was otherwise negative except as noted above.  [  ] Due to altered mental status/intubation, subjective information was not attained from the patient. History was obtained, to the extent possible, from review of the chart and collateral sources of information.    Daily     Daily     Diet, Clear Liquid:   Bariatric Clear Liquid (BARICLLIQ) (06-22-21 @ 11:27)      CURRENT MEDS:  Neurologic Medications  acetaminophen  IVPB .. 1000 milliGRAM(s) IV Intermittent once  gabapentin   Solution 125 milliGRAM(s) Oral three times a day  ketorolac   Injectable 15 milliGRAM(s) IV Push every 6 hours PRN Moderate Pain (4 - 6)  ondansetron Injectable 4 milliGRAM(s) IV Push every 6 hours PRN Nausea  prochlorperazine   Injectable 5 milliGRAM(s) IV Push every 6 hours PRN nausea/emesis    Respiratory Medications    Cardiovascular Medications    Gastrointestinal Medications  lactated ringers. 1000 milliLiter(s) IV Continuous <Continuous>  pantoprazole  Injectable 40 milliGRAM(s) IV Push daily    Genitourinary Medications    Hematologic/Oncologic Medications  heparin   Injectable 5000 Unit(s) SubCutaneous every 8 hours    Antimicrobial/Immunologic Medications    Endocrine/Metabolic Medications    Topical/Other Medications      ICU Vital Signs Last 24 Hrs  T(C): 37.3 (23 Jun 2021 08:00), Max: 37.3 (23 Jun 2021 04:23)  T(F): 99.2 (23 Jun 2021 08:00), Max: 99.2 (23 Jun 2021 04:23)  HR: 79 (23 Jun 2021 08:00) (69 - 90)  BP: 116/78 (23 Jun 2021 08:00) (116/78 - 162/100)  BP(mean): 93 (23 Jun 2021 08:00) (85 - 107)  ABP: --  ABP(mean): --  RR: 18 (23 Jun 2021 08:00) (16 - 21)  SpO2: 99% (23 Jun 2021 08:00) (96% - 100%)      Adult Advanced Hemodynamics Last 24 Hrs  CVP(mm Hg): --  CVP(cm H2O): --  CO: --  CI: --  PA: --  PA(mean): --  PCWP: --  SVR: --  SVRI: --  PVR: --  PVRI: --          I&O's Summary    22 Jun 2021 07:01  -  23 Jun 2021 07:00  --------------------------------------------------------  IN: 1660 mL / OUT: 400 mL / NET: 1260 mL      I&O's Detail    22 Jun 2021 07:01  -  23 Jun 2021 07:00  --------------------------------------------------------  IN:    Lactated Ringers: 1600 mL    Oral Fluid: 60 mL  Total IN: 1660 mL    OUT:    Voided (mL): 400 mL  Total OUT: 400 mL    Total NET: 1260 mL          PHYSICAL EXAM:    General/Neuro  Deficits:                             alert & oriented x 3, no focal deficits  Pupils: PERRLA     Lungs:      clear to auscultation, Normal expansion/effort.     Cardiovascular : S1, S2.  Regular rate and rhythm.  No Peripheral edema   Cardiac Rhythm: Normal Sinus Rhythm    GI: Abdomen soft, Non-tender, Non-distended.    Wound: 5 port sites     Extremities: Extremities warm, pink, well-perfused. Pulses: palpable dp b/l    Derm: Good skin turgor, no skin breakdown.      :     voiding       CXR:     LABS:  CAPILLARY BLOOD GLUCOSE                              11.4   10.40 )-----------( 238      ( 23 Jun 2021 01:19 )             35.1       06-23    140  |  104  |  9<L>  ----------------------------<  156<H>  4.3   |  26  |  0.7    Ca    8.6      23 Jun 2021 01:19  Phos  3.9     06-23  Mg     1.7     06-23

## 2021-06-23 NOTE — DISCHARGE NOTE PROVIDER - NSDCFUSCHEDAPPT_GEN_ALL_CORE_FT
BRIAN JOHNSTON ; 07/02/2021 ; \[facility] [place] [facility]\"" [place] [address]

## 2021-06-23 NOTE — DISCHARGE NOTE PROVIDER - NSDCFUADDINST_GEN_ALL_CORE_FT
follow up with Dr Martinez as scheduled  follow up with PMD    no strenuous activity  keep wound clean and dry  no tub bath  resume home medications as per bariatric surgery    if experience fever, shortness of breath, chest pain, dizziness, vomiting, bleeding or drainage from wound call MD or return to ED

## 2021-06-24 ENCOUNTER — TRANSCRIPTION ENCOUNTER (OUTPATIENT)
Age: 31
End: 2021-06-24

## 2021-06-24 VITALS
TEMPERATURE: 98 F | OXYGEN SATURATION: 99 % | SYSTOLIC BLOOD PRESSURE: 117 MMHG | DIASTOLIC BLOOD PRESSURE: 56 MMHG | HEART RATE: 70 BPM | RESPIRATION RATE: 20 BRPM

## 2021-06-24 LAB
ANION GAP SERPL CALC-SCNC: 8 MMOL/L — SIGNIFICANT CHANGE UP (ref 7–14)
BUN SERPL-MCNC: 8 MG/DL — LOW (ref 10–20)
CALCIUM SERPL-MCNC: 8.5 MG/DL — SIGNIFICANT CHANGE UP (ref 8.5–10.1)
CHLORIDE SERPL-SCNC: 104 MMOL/L — SIGNIFICANT CHANGE UP (ref 98–110)
CO2 SERPL-SCNC: 26 MMOL/L — SIGNIFICANT CHANGE UP (ref 17–32)
CREAT SERPL-MCNC: 0.6 MG/DL — LOW (ref 0.7–1.5)
GLUCOSE SERPL-MCNC: 117 MG/DL — HIGH (ref 70–99)
HCT VFR BLD CALC: 32.1 % — LOW (ref 37–47)
HGB BLD-MCNC: 10.4 G/DL — LOW (ref 12–16)
MAGNESIUM SERPL-MCNC: 1.9 MG/DL — SIGNIFICANT CHANGE UP (ref 1.8–2.4)
MCHC RBC-ENTMCNC: 26.5 PG — LOW (ref 27–31)
MCHC RBC-ENTMCNC: 32.4 G/DL — SIGNIFICANT CHANGE UP (ref 32–37)
MCV RBC AUTO: 81.9 FL — SIGNIFICANT CHANGE UP (ref 81–99)
NRBC # BLD: 0 /100 WBCS — SIGNIFICANT CHANGE UP (ref 0–0)
PHOSPHATE SERPL-MCNC: 3.3 MG/DL — SIGNIFICANT CHANGE UP (ref 2.1–4.9)
PLATELET # BLD AUTO: 199 K/UL — SIGNIFICANT CHANGE UP (ref 130–400)
POTASSIUM SERPL-MCNC: 3.9 MMOL/L — SIGNIFICANT CHANGE UP (ref 3.5–5)
POTASSIUM SERPL-SCNC: 3.9 MMOL/L — SIGNIFICANT CHANGE UP (ref 3.5–5)
RBC # BLD: 3.92 M/UL — LOW (ref 4.2–5.4)
RBC # FLD: 13.8 % — SIGNIFICANT CHANGE UP (ref 11.5–14.5)
SODIUM SERPL-SCNC: 138 MMOL/L — SIGNIFICANT CHANGE UP (ref 135–146)
WBC # BLD: 7.83 K/UL — SIGNIFICANT CHANGE UP (ref 4.8–10.8)
WBC # FLD AUTO: 7.83 K/UL — SIGNIFICANT CHANGE UP (ref 4.8–10.8)

## 2021-06-24 PROCEDURE — 93010 ELECTROCARDIOGRAM REPORT: CPT

## 2021-06-24 PROCEDURE — 99233 SBSQ HOSP IP/OBS HIGH 50: CPT | Mod: 24

## 2021-06-24 RX ORDER — ACETAMINOPHEN 500 MG
1000 TABLET ORAL ONCE
Refills: 0 | Status: COMPLETED | OUTPATIENT
Start: 2021-06-24 | End: 2021-06-24

## 2021-06-24 RX ADMIN — PANTOPRAZOLE SODIUM 40 MILLIGRAM(S): 20 TABLET, DELAYED RELEASE ORAL at 12:10

## 2021-06-24 RX ADMIN — GABAPENTIN 125 MILLIGRAM(S): 400 CAPSULE ORAL at 05:07

## 2021-06-24 RX ADMIN — Medication 15 MILLIGRAM(S): at 02:46

## 2021-06-24 RX ADMIN — Medication 15 MILLIGRAM(S): at 09:43

## 2021-06-24 RX ADMIN — Medication 400 MILLIGRAM(S): at 04:45

## 2021-06-24 RX ADMIN — Medication 15 MILLIGRAM(S): at 09:46

## 2021-06-24 RX ADMIN — HEPARIN SODIUM 5000 UNIT(S): 5000 INJECTION INTRAVENOUS; SUBCUTANEOUS at 05:07

## 2021-06-24 NOTE — PROGRESS NOTE ADULT - ASSESSMENT
Assessment & Plan  31 year old obese female with BMI of 50 is s/p laparoscopic sleeve gastrectomy with ALLYN and bilateral TAP block. SICU consulted for hemodynamic monitoring    NEURO: no chronic disease  Pain:   -IV Tylenol   -Toradol   -Dilaudid for severe pain    -Gabapentin     RESP: history of RYLAND on CPAP and childhood asthma  Monitor oxygen saturation  Saturating well on room air  RYLAND:   -CPAP bedside, encourage use in PM  Encourage incentive spirometry 10x/hr  Daily AM CXR    CARDS: no chronic disease   Maintain normotensive  Monitor for tachycardia   No home medications to restart   -f/up ECG    GI/NUTR: history of GERD and obesity with BMI 50  Diet: Bariatric clear liquid diet, completed 3oz  - epigastric pain, staying for the night  GI prophylaxis: PPI  Bowel regimen: holding  Nausea/vomiting: zofran, compazine   -Monitor QTC    /RENAL: no chronic disease  Monitor urine output  LR@100   Labs:  Monitor labs and replete prn    HEME/ONC: no chronic disease  DVT prophylaxis: HSQ, SCDS  Monitor Labs:                 ID: no chronic disease  Monitor for signs of infection  WBC:   Temp trend- 24hrs T(F): afebrile  Antibiotics: periop completed    ENDO: no chronic disease  Monitor Glucose  FS    LINES/DRAINS:  PIV    DISPO:  Stepdown Unit

## 2021-06-24 NOTE — PROGRESS NOTE ADULT - SUBJECTIVE AND OBJECTIVE BOX
GENERAL SURGERY PROGRESS NOTE    Patient: BRIAN JOHNSTON , 31y ([birth year])Female   MRN: [id number]  Location: [address]  Visit: 06-22-21 Inpatient  Date: 06-24-21 @ 07:18    Hospital Day #:3  Post-Op Day #:2    Procedure/Dx/Injuries: s/p laparoscopic sleeve gasterectomy    Events of past 24 hours: Patient finished 2nd row of 3 oz sigrid CLD late last night and wanted to stay the night. No acute events. Patient ambulating, voided.     PAST MEDICAL & SURGICAL HISTORY:  Asthma  Last asthma attack as a child    Obesity  BMI 50    RYLAND on CPAP    GERD (gastroesophageal reflux disease)    Encounter for surveillance of transdermal patch hormonal contraceptive device    History of esophagogastroduodenoscopy (EGD)        Vitals:   T(F): 97.1 (06-24-21 @ 05:13), Max: 99.2 (06-23-21 @ 08:00)  HR: 77 (06-24-21 @ 05:13)  BP: 117/56 (06-24-21 @ 05:13)  RR: 20 (06-24-21 @ 05:13)  SpO2: 99% (06-24-21 @ 05:13)      Diet, Clear Liquid:   Bariatric Clear Liquid (BARICLLIQ)      Fluids:     I & O's:    06-23-21 @ 07:01  -  06-24-21 @ 07:00  --------------------------------------------------------  IN:    Lactated Ringers: 900 mL  Total IN: 900 mL    OUT:    Voided (mL): 700 mL  Total OUT: 700 mL    Total NET: 200 mL        Bowel Movement: : [] YES [x] NO  Flatus: : [x] YES [] NO    PHYSICAL EXAM:  General: NAD, AAOx3, calm and cooperative  HEENT: NCAT, MARY, EOMI, Trachea ML, Neck supple  Cardiac: RRR S1, S2, no Murmurs, rubs or gallops  Respiratory: CTAB, normal respiratory effort, breath sounds equal BL, no wheeze, rhonchi or crackles  Abdomen: Soft, non-distended, non-tender, no rebound, no guarding. +BS.  Musculoskeletal: Strength 5/5 BL UE/LE, ROM intact, compartments soft  Incision/wound: healing well, dressings in place, clean, dry and intact    MEDICATIONS  (STANDING):  gabapentin   Solution 125 milliGRAM(s) Oral three times a day  heparin   Injectable 5000 Unit(s) SubCutaneous every 8 hours  lactated ringers. 1000 milliLiter(s) (100 mL/Hr) IV Continuous <Continuous>  pantoprazole  Injectable 40 milliGRAM(s) IV Push daily    MEDICATIONS  (PRN):  ketorolac   Injectable 15 milliGRAM(s) IV Push every 6 hours PRN Moderate Pain (4 - 6)  ondansetron Injectable 4 milliGRAM(s) IV Push every 6 hours PRN Nausea  prochlorperazine   Injectable 5 milliGRAM(s) IV Push every 6 hours PRN nausea/emesis      DVT PROPHYLAXIS: heparin   Injectable 5000 Unit(s) SubCutaneous every 8 hours    GI PROPHYLAXIS: pantoprazole  Injectable 40 milliGRAM(s) IV Push daily    ANTICOAGULATION:   ANTIBIOTICS:            LAB/STUDIES:  Labs:  CAPILLARY BLOOD GLUCOSE                              11.4   10.40 )-----------( 238      ( 23 Jun 2021 01:19 )             35.1         06-23    140  |  104  |  9<L>  ----------------------------<  156<H>  4.3   |  26  |  0.7          LFTs:         Coags:                        IMAGING:

## 2021-06-24 NOTE — PROGRESS NOTE ADULT - SUBJECTIVE AND OBJECTIVE BOX
BRIAN REYNOLDSGO  [id number]  31y Female    Indication for ICU admission:  Admit Date:06-22-21  ICU Date:  OR Date:    Carrots (Other)  Originally Entered as [ANAPHYLAXIS] reaction to [watermelon] (Unknown)  penicillin (Rash)    PAST MEDICAL & SURGICAL HISTORY:  Asthma  Last asthma attack as a child    Obesity  BMI 50    RYLAND on CPAP    GERD (gastroesophageal reflux disease)    Encounter for surveillance of transdermal patch hormonal contraceptive device    History of esophagogastroduodenoscopy (EGD)      Home Medications:  Multiple Vitamins oral capsule: 1 cap(s) orally once a day (22 Jun 2021 08:07)  omeprazole 40 mg oral delayed release capsule: 1 cap(s) orally once a day (22 Jun 2021 08:07)        24HRS EVENT:  6/23  Night  - epigastric pain, staying for the night  -got IV Tylenolx1  -ordered ECG  - completed 3oz      Day  -carafate x2  -dc today       DVT Prophylaxis: heparin   Injectable 5000 Unit(s) SubCutaneous every 8 hours      GI Prophylaxis:pantoprazole  Injectable 40 milliGRAM(s) IV Push daily      ***Tubes/Lines/Drains  ***  Peripheral IV  Arterial Line		                  Central Line                            Urinary Catheter		Indication: Strict I&O          [X] A ten-point review of systems was otherwise negative except as noted above.  [  ] Due to altered mental status/intubation, subjective information was not attained from the patient. History was obtained, to the extent possible, from review of the chart and collateral sources of information.       BRIAN JOHNSTON  [id number]  31y Female    Indication for ICU admission:  Admit Date:06-22-21  ICU Date:  OR Date:    Carrots (Other)  Originally Entered as [ANAPHYLAXIS] reaction to [watermelon] (Unknown)  penicillin (Rash)    PAST MEDICAL & SURGICAL HISTORY:  Asthma  Last asthma attack as a child    Obesity  BMI 50    RYLAND on CPAP    GERD (gastroesophageal reflux disease)    Encounter for surveillance of transdermal patch hormonal contraceptive device    History of esophagogastroduodenoscopy (EGD)      Home Medications:  Multiple Vitamins oral capsule: 1 cap(s) orally once a day (22 Jun 2021 08:07)  omeprazole 40 mg oral delayed release capsule: 1 cap(s) orally once a day (22 Jun 2021 08:07)        24HRS EVENT:  6/23  Night  - epigastric pain, staying for the night  -got IV Tylenolx1  -ordered ECG  - completed 3oz      Day  -carafate x2  -dc today       DVT Prophylaxis: heparin   Injectable 5000 Unit(s) SubCutaneous every 8 hours      GI Prophylaxis:pantoprazole  Injectable 40 milliGRAM(s) IV Push daily      ***Tubes/Lines/Drains  ***  Peripheral IV  Arterial Line		                  Central Line                            Urinary Catheter		Indication: Strict I&O          [X] A ten-point review of systems was otherwise negative except as noted above.  [  ] Due to altered mental status/intubation, subjective information was not attained from the patient. History was obtained, to the extent possible, from review of the chart and collateral sources of information.    Daily     Daily     Diet, Clear Liquid:   Bariatric Clear Liquid (BARICLLIQ) (06-22-21 @ 11:27)      CURRENT MEDS:  Neurologic Medications  gabapentin   Solution 125 milliGRAM(s) Oral three times a day  ketorolac   Injectable 15 milliGRAM(s) IV Push every 6 hours PRN Moderate Pain (4 - 6)  ondansetron Injectable 4 milliGRAM(s) IV Push every 6 hours PRN Nausea  prochlorperazine   Injectable 5 milliGRAM(s) IV Push every 6 hours PRN nausea/emesis    Respiratory Medications    Cardiovascular Medications    Gastrointestinal Medications  lactated ringers. 1000 milliLiter(s) IV Continuous <Continuous>  pantoprazole  Injectable 40 milliGRAM(s) IV Push daily    Genitourinary Medications    Hematologic/Oncologic Medications  heparin   Injectable 5000 Unit(s) SubCutaneous every 8 hours    Antimicrobial/Immunologic Medications    Endocrine/Metabolic Medications    Topical/Other Medications      ICU Vital Signs Last 24 Hrs  T(C): 36.2 (24 Jun 2021 05:13), Max: 37.3 (23 Jun 2021 08:00)  T(F): 97.1 (24 Jun 2021 05:13), Max: 99.2 (23 Jun 2021 08:00)  HR: 77 (24 Jun 2021 05:13) (62 - 79)  BP: 117/56 (24 Jun 2021 05:13) (116/78 - 130/64)  BP(mean): 80 (24 Jun 2021 05:13) (80 - 93)  RR: 20 (24 Jun 2021 05:13) (18 - 20)  SpO2: 99% (24 Jun 2021 05:13) (99% - 99%)      I&O's Summary    23 Jun 2021 07:01  -  24 Jun 2021 07:00  --------------------------------------------------------  IN: 900 mL / OUT: 700 mL / NET: 200 mL      I&O's Detail    23 Jun 2021 07:01  -  24 Jun 2021 07:00  --------------------------------------------------------  IN:    Lactated Ringers: 900 mL  Total IN: 900 mL    OUT:    Voided (mL): 700 mL  Total OUT: 700 mL    Total NET: 200 mL          PHYSICAL EXAM:    General/Neuro  awake alet following commans    Lungs:      clear to auscultation, Normal expansion/effort.     Cardiovascular : S1, S2.  Regular rate and rhythm.    Cardiac Rhythm: Normal Sinus Rhythm    GI: Abdomen soft, Non-tender, Non-distended.  incisions c/d/i diffuse TTP     Extremities: Extremities warm, pink, well-perfused.    Derm: Good skin turgor, no skin breakdown.        CXR:     LABS:  CAPILLARY BLOOD GLUCOSE                              11.4   10.40 )-----------( 238      ( 23 Jun 2021 01:19 )             35.1       06-23    140  |  104  |  9<L>  ----------------------------<  156<H>  4.3   |  26  |  0.7    Ca    8.6      23 Jun 2021 01:19  Phos  3.9     06-23  Mg     1.7     06-23

## 2021-06-24 NOTE — PROGRESS NOTE ADULT - ATTENDING COMMENTS
Pt POD#1 s/p lap sleeve.  Not yet comfortable with p.o. intake.  Pt reports difficulty and pain with water, but better with tea. Plan for carafate and try different fluids.  Pt encouraged to ambulate; will continue VTE/GI prophylaxis.  Continue to monitor.
stable overnight, tolerating diet, dc today
Pt is POD#2 s/p lap sleeve.  Much better today.  Plan for discharge and continue po intake as tolerated.  Pt is already scheduled to f/u with me next week.
stable overnight, reflux this am , labs wnl , ekg unchanged, tolerating sigrid diet, dc/downgrade today

## 2021-06-24 NOTE — DISCHARGE NOTE NURSING/CASE MANAGEMENT/SOCIAL WORK - PATIENT PORTAL LINK FT
You can access the FollowMyHealth Patient Portal offered by [facility] by registering at the following website: [email or web address]. By joining [employer]’s FollowMyHealth portal, you will also be able to view your health information using other applications (apps) compatible with our system.

## 2021-06-24 NOTE — PROGRESS NOTE ADULT - ASSESSMENT
31y F S/P LAPAROSCOPIC SLEEVE GASTRECTOMY POD#2 HD#3 Patient seen and examined at bedside.    PLAN:  OOB to ambulate  Contiune pain control  DVT prophylaxis  Continue bariatric clears, advance as tolerated  Passed trial of void  Anticipate discharge later today

## 2021-06-28 RX ORDER — GABAPENTIN 250 MG/5ML
250 SOLUTION ORAL EVERY 8 HOURS
Qty: 15 | Refills: 0 | Status: DISCONTINUED | COMMUNITY
Start: 2021-06-08 | End: 2021-06-28

## 2021-07-01 DIAGNOSIS — E66.01 MORBID (SEVERE) OBESITY DUE TO EXCESS CALORIES: ICD-10-CM

## 2021-07-01 DIAGNOSIS — K21.9 GASTRO-ESOPHAGEAL REFLUX DISEASE WITHOUT ESOPHAGITIS: ICD-10-CM

## 2021-07-01 DIAGNOSIS — F41.9 ANXIETY DISORDER, UNSPECIFIED: ICD-10-CM

## 2021-07-01 DIAGNOSIS — G47.33 OBSTRUCTIVE SLEEP APNEA (ADULT) (PEDIATRIC): ICD-10-CM

## 2021-07-01 DIAGNOSIS — J45.909 UNSPECIFIED ASTHMA, UNCOMPLICATED: ICD-10-CM

## 2021-07-01 DIAGNOSIS — E11.9 TYPE 2 DIABETES MELLITUS WITHOUT COMPLICATIONS: ICD-10-CM

## 2021-07-01 DIAGNOSIS — Z88.0 ALLERGY STATUS TO PENICILLIN: ICD-10-CM

## 2021-07-01 DIAGNOSIS — Z91.018 ALLERGY TO OTHER FOODS: ICD-10-CM

## 2021-07-01 DIAGNOSIS — F32.9 MAJOR DEPRESSIVE DISORDER, SINGLE EPISODE, UNSPECIFIED: ICD-10-CM

## 2021-07-02 ENCOUNTER — APPOINTMENT (OUTPATIENT)
Dept: SURGERY | Facility: CLINIC | Age: 31
End: 2021-07-02
Payer: MEDICAID

## 2021-07-02 VITALS — BODY MASS INDEX: 41.48 KG/M2 | TEMPERATURE: 97.2 F | WEIGHT: 293 LBS | HEIGHT: 70.5 IN

## 2021-07-02 PROCEDURE — 99024 POSTOP FOLLOW-UP VISIT: CPT

## 2021-07-02 RX ORDER — PEDI MULTIVIT NO.25/FOLIC ACID 300 MCG
TABLET,CHEWABLE ORAL DAILY
Refills: 0 | Status: ACTIVE | COMMUNITY

## 2021-07-02 RX ORDER — CALCIUM CARB/VITAMIN D3/VIT K1 650MG-12.5
TABLET,CHEWABLE ORAL DAILY
Refills: 0 | Status: ACTIVE | COMMUNITY

## 2021-07-07 NOTE — HISTORY OF PRESENT ILLNESS
[Procedure: ___] : Procedure performed: [unfilled]  [Date of Surgery: ___] : Date of Surgery:   [unfilled] [Pre-Op Weight ___] : Pre-op weight was [unfilled] lbs [___ Days Post Op] : [unfilled] days  [Phase 2] : Phase 2 [Walking] : walking [de-identified] : Patient had surgery approximately 10 days ago. She still feels a bit fatigued.\par She denies heartburn/reflux/vomiting. No fever/chills or SOB.\par She is compliant with CPAP use.\par

## 2021-07-07 NOTE — PHYSICAL EXAM
[de-identified] : Soft, nondistended [de-identified] : Incisions dry and clean with no evidence of erythema

## 2021-07-07 NOTE — ASSESSMENT
[___ Days Post Op] : [unfilled] days [Previous Visit Weight: ___] : Previous visit weight: [unfilled] lbs [Today's Weight: ___] : Today's weight:[unfilled] lbs [Today's BMI: ___] : Today's BMI: [unfilled] [de-identified] : BRIAN JOHNSTON is a 31 year female seen today for postoperative bariatric visit.  \par patient is compliant with dietary guidelines and she advanced her diet as recommended. She tolerated Greek yogurt, lentil soup, eggs. She tried refried beans but it made her nauseous. She is drinking 1 1/2 protein shake daily. Recommend that she aim for 60 grams of protein/day. \par Fluid intake is adequate with mainly water and mint teas.\par She is walking daily for 10-15 minutes for exercise. Reinforced no heavy lifting, bending and pulling for 2 months.\par He/She is exercising 3 days week for 30 minutes.  Recommend adding weight bearing exercise.\par \par Plan: Advance diet as tolerated. RTO in 3 weeks. Call with concerns.\par \par \par

## 2021-07-23 ENCOUNTER — APPOINTMENT (OUTPATIENT)
Dept: SURGERY | Facility: CLINIC | Age: 31
End: 2021-07-23
Payer: MEDICAID

## 2021-07-23 ENCOUNTER — APPOINTMENT (OUTPATIENT)
Dept: SURGERY | Facility: CLINIC | Age: 31
End: 2021-07-23

## 2021-07-23 VITALS — WEIGHT: 293 LBS | HEIGHT: 70.5 IN | BODY MASS INDEX: 41.48 KG/M2 | TEMPERATURE: 97.9 F

## 2021-07-23 DIAGNOSIS — F32.9 ANXIETY DISORDER, UNSPECIFIED: ICD-10-CM

## 2021-07-23 DIAGNOSIS — F41.9 ANXIETY DISORDER, UNSPECIFIED: ICD-10-CM

## 2021-07-23 DIAGNOSIS — J45.909 UNSPECIFIED ASTHMA, UNCOMPLICATED: ICD-10-CM

## 2021-07-23 PROCEDURE — 99024 POSTOP FOLLOW-UP VISIT: CPT

## 2021-07-23 RX ORDER — URSODIOL 300 MG/1
300 CAPSULE ORAL
Qty: 60 | Refills: 5 | Status: COMPLETED | COMMUNITY
Start: 2021-07-23 | End: 2022-01-23

## 2021-07-29 NOTE — PHYSICAL EXAM
[de-identified] : Soft nondistended [de-identified] : Incisions dry and clean with no evidence of erythema

## 2021-07-29 NOTE — ADDENDUM
[FreeTextEntry1] : Postop Patient doing well overall; no complaints at this time.\par Patient seen, examined and reviewed with practitioner.  I agree with the assessment and plan; note amended as appropriate.\par

## 2021-07-29 NOTE — HISTORY OF PRESENT ILLNESS
[___ Months Post Op] : [unfilled] months [Procedure: ___] : Procedure performed: [unfilled]  [Date of Surgery: ___] : Date of Surgery:   [unfilled] [Pre-Op Weight ___] : Pre-op weight was [unfilled] lbs [Phase 3] : Phase 3 [Walking] : walking [de-identified] : Patient had surgery approximately 1 month ago. \par She denies heartburn/reflux/vomiting and food intolerance.\par She is compliant with CPAP use.\par

## 2021-07-29 NOTE — DATA REVIEWED
[FreeTextEntry1] : Wt. change since last visit: -3 lbs\par %EWL: 17\par TWL: 38 lbs\par \par \par \par

## 2021-07-29 NOTE — ASSESSMENT
[Previous Visit Weight: ___] : Previous visit weight: [unfilled] lbs [Today's Weight: ___] : Today's weight:[unfilled] lbs [___ Months Post Op] : [unfilled] months [de-identified] : BRIAN JOHNSTON is a 31 year female seen today for postoperative bariatric follow up visit. \par Patient is compliant with dietary guidelines.\par Breakfast - protein shake as she runs to work. Lunch - Greek yogurt or cottage cheese. Dinner - lobster bisque without added fat, refried beans or chicken salad. \par Fluid intake is adequate with mainly water and Gatorade Zero.\par She is walking for 30 minutes. Reinforced no heavy lifting, bending and pulling for 1 more month.\par \par Plan: Start Actigall twice daily. Continue to advance diet as tolerated. RTO in 2 months with blood work. Call with concerns.

## 2021-09-28 ENCOUNTER — APPOINTMENT (OUTPATIENT)
Dept: SURGERY | Facility: CLINIC | Age: 31
End: 2021-09-28

## 2021-12-01 PROCEDURE — G9005: CPT

## 2022-01-02 ENCOUNTER — EMERGENCY (EMERGENCY)
Facility: HOSPITAL | Age: 32
LOS: 0 days | Discharge: HOME | End: 2022-01-02
Attending: EMERGENCY MEDICINE | Admitting: EMERGENCY MEDICINE
Payer: MEDICAID

## 2022-01-02 VITALS
RESPIRATION RATE: 18 BRPM | TEMPERATURE: 97 F | HEART RATE: 73 BPM | OXYGEN SATURATION: 99 % | SYSTOLIC BLOOD PRESSURE: 121 MMHG | HEIGHT: 72 IN | WEIGHT: 279.99 LBS | DIASTOLIC BLOOD PRESSURE: 60 MMHG

## 2022-01-02 DIAGNOSIS — R50.9 FEVER, UNSPECIFIED: ICD-10-CM

## 2022-01-02 DIAGNOSIS — J45.909 UNSPECIFIED ASTHMA, UNCOMPLICATED: ICD-10-CM

## 2022-01-02 DIAGNOSIS — Z98.890 OTHER SPECIFIED POSTPROCEDURAL STATES: Chronic | ICD-10-CM

## 2022-01-02 DIAGNOSIS — Z91.018 ALLERGY TO OTHER FOODS: ICD-10-CM

## 2022-01-02 DIAGNOSIS — R05.1 ACUTE COUGH: ICD-10-CM

## 2022-01-02 DIAGNOSIS — Z88.0 ALLERGY STATUS TO PENICILLIN: ICD-10-CM

## 2022-01-02 DIAGNOSIS — U07.1 COVID-19: ICD-10-CM

## 2022-01-02 DIAGNOSIS — J02.9 ACUTE PHARYNGITIS, UNSPECIFIED: ICD-10-CM

## 2022-01-02 DIAGNOSIS — G47.33 OBSTRUCTIVE SLEEP APNEA (ADULT) (PEDIATRIC): ICD-10-CM

## 2022-01-02 DIAGNOSIS — K21.9 GASTRO-ESOPHAGEAL REFLUX DISEASE WITHOUT ESOPHAGITIS: ICD-10-CM

## 2022-01-02 LAB — SARS-COV-2 RNA SPEC QL NAA+PROBE: DETECTED

## 2022-01-02 PROCEDURE — 99284 EMERGENCY DEPT VISIT MOD MDM: CPT

## 2022-01-02 RX ORDER — DEXAMETHASONE 0.5 MG/5ML
10 ELIXIR ORAL ONCE
Refills: 0 | Status: COMPLETED | OUTPATIENT
Start: 2022-01-02 | End: 2022-01-02

## 2022-01-02 RX ORDER — IBUPROFEN 200 MG
600 TABLET ORAL ONCE
Refills: 0 | Status: DISCONTINUED | OUTPATIENT
Start: 2022-01-02 | End: 2022-01-02

## 2022-01-02 RX ADMIN — Medication 10 MILLIGRAM(S): at 22:47

## 2022-01-02 NOTE — ED PROVIDER NOTE - NSICDXPASTMEDICALHX_GEN_ALL_CORE_FT
PAST MEDICAL HISTORY:  Asthma Last asthma attack as a child    Encounter for surveillance of transdermal patch hormonal contraceptive device     GERD (gastroesophageal reflux disease)     Obesity BMI 50    RYLAND on CPAP

## 2022-01-02 NOTE — ED PROVIDER NOTE - NSFOLLOWUPINSTRUCTIONS_ED_ALL_ED_FT
Follow up with your primary care doctor in 1-2 days    Viral Illness, Adult  Viruses are tiny germs that can get into a person's body and cause illness. There are many different types of viruses, and they cause many types of illness. Viral illnesses can range from mild to severe. They can affect various parts of the body.    Common illnesses that are caused by a virus include colds and the flu. Viral illnesses also include serious conditions such as HIV/AIDS (human immunodeficiency virus/acquired immunodeficiency syndrome). A few viruses have been linked to certain cancers.    What are the causes?  Many types of viruses can cause illness. Viruses invade cells in your body, multiply, and cause the infected cells to malfunction or die. When the cell dies, it releases more of the virus. When this happens, you develop symptoms of the illness, and the virus continues to spread to other cells. If the virus takes over the function of the cell, it can cause the cell to divide and grow out of control, as is the case when a virus causes cancer.    Different viruses get into the body in different ways. You can get a virus by:    Swallowing food or water that is contaminated with the virus.  Breathing in droplets that have been coughed or sneezed into the air by an infected person.  Touching a surface that has been contaminated with the virus and then touching your eyes, nose, or mouth.  Being bitten by an insect or animal that carries the virus.  Having sexual contact with a person who is infected with the virus.  Being exposed to blood or fluids that contain the virus, either through an open cut or during a transfusion.    If a virus enters your body, your body's defense system (immune system) will try to fight the virus. You may be at higher risk for a viral illness if your immune system is weak.    What are the signs or symptoms?  Symptoms vary depending on the type of virus and the location of the cells that it invades. Common symptoms of the main types of viral illnesses include:    Cold and flu viruses     Fever.  Headache.  Sore throat.  Muscle aches.  Nasal congestion.  Cough.  Digestive system (gastrointestinal) viruses     Fever.  Abdominal pain.  Nausea.  Diarrhea.  Liver viruses (hepatitis)     Loss of appetite.  Tiredness.  Yellowing of the skin (jaundice).  Brain and spinal cord viruses     Fever.  Headache.  Stiff neck.  Nausea and vomiting.  Confusion or sleepiness.  Skin viruses     Warts.  Itching.  Rash.  Sexually transmitted viruses     Discharge.  Swelling.  Redness.  Rash.  How is this treated?  Viruses can be difficult to treat because they live within cells. Antibiotic medicines do not treat viruses because these drugs do not get inside cells. Treatment for a viral illness may include:    Resting and drinking plenty of fluids.  Medicines to relieve symptoms. These can include over-the-counter medicine for pain and fever, medicines for cough or congestion, and medicines to relieve diarrhea.  Antiviral medicines. These drugs are available only for certain types of viruses. They may help reduce flu symptoms if taken early. There are also many antiviral medicines for hepatitis and HIV/AIDS.    Some viral illnesses can be prevented with vaccinations. A common example is the flu shot.    Follow these instructions at home:  Medicines     Image   Take over-the-counter and prescription medicines only as told by your health care provider.  If you were prescribed an antiviral medicine, take it as told by your health care provider. Do not stop taking the medicine even if you start to feel better.  Be aware of when antibiotics are needed and when they are not needed. Antibiotics do not treat viruses. If your health care provider thinks that you may have a bacterial infection as well as a viral infection, you may get an antibiotic.    Do not ask for an antibiotic prescription if you have been diagnosed with a viral illness. That will not make your illness go away faster.  Frequently taking antibiotics when they are not needed can lead to antibiotic resistance. When this develops, the medicine no longer works against the bacteria that it normally fights.    General instructions     Drink enough fluids to keep your urine clear or pale yellow.  Rest as much as possible.  Return to your normal activities as told by your health care provider. Ask your health care provider what activities are safe for you.  Keep all follow-up visits as told by your health care provider. This is important.  How is this prevented?  ImageTake these actions to reduce your risk of viral infection:    Eat a healthy diet and get enough rest.  Wash your hands often with soap and water. This is especially important when you are in public places. If soap and water are not available, use hand .  Avoid close contact with friends and family who have a viral illness.  If you travel to areas where viral gastrointestinal infection is common, avoid drinking water or eating raw food.  Keep your immunizations up to date. Get a flu shot every year as told by your health care provider.  Do not share toothbrushes, nail clippers, razors, or needles with other people.  Always practice safe sex.    Contact a health care provider if:  You have symptoms of a viral illness that do not go away.  Your symptoms come back after going away.  Your symptoms get worse.  Get help right away if:  You have trouble breathing.  You have a severe headache or a stiff neck.  You have severe vomiting or abdominal pain.  This information is not intended to replace advice given to you by your health care provider. Make sure you discuss any questions you have with your health care provider.

## 2022-01-02 NOTE — ED PROVIDER NOTE - ATTENDING CONTRIBUTION TO CARE
I personally evaluated the patient. I reviewed the Resident’s or Physician Assistant’s note (as assigned above), and agree with the findings and plan except as documented in my note.  Chart reviewed.  Presents with fever, body aches, cough and sore throat.  Exam shows alert patient in no distress, HEENT NCAT, throat n o exudates, lungs clear, RR S1S2, abdomens oft NT +BS, no rash.

## 2022-01-02 NOTE — ED PROVIDER NOTE - WET READ LAUNCH FT
There are no Wet Read(s) to document. Closure 2 Information: This tab is for additional flaps and grafts, including complex repair and grafts and complex repair and flaps. You can also specify a different location for the additional defect, if the location is the same you do not need to select a new one. We will insert the automated text for the repair you select below just as we do for solitary flaps and grafts. Please note that at this time if you select a location with a different insurance zone you will need to override the ICD10 and CPT if appropriate.

## 2022-01-02 NOTE — ED PROVIDER NOTE - PATIENT PORTAL LINK FT
You can access the FollowMyHealth Patient Portal offered by [facility] by registering at the following website: [email or web address]. By joining [employer]’s FollowMyHealth portal, you will also be able to view your health information using other applications (apps) compatible with our system.

## 2022-03-25 ENCOUNTER — TRANSCRIPTION ENCOUNTER (OUTPATIENT)
Age: 32
End: 2022-03-25

## 2022-08-18 ENCOUNTER — EMERGENCY (EMERGENCY)
Facility: HOSPITAL | Age: 32
LOS: 0 days | Discharge: HOME | End: 2022-08-19
Attending: EMERGENCY MEDICINE | Admitting: EMERGENCY MEDICINE

## 2022-08-18 VITALS
SYSTOLIC BLOOD PRESSURE: 133 MMHG | DIASTOLIC BLOOD PRESSURE: 67 MMHG | OXYGEN SATURATION: 99 % | TEMPERATURE: 97 F | HEART RATE: 93 BPM | RESPIRATION RATE: 18 BRPM

## 2022-08-18 DIAGNOSIS — Z97.5 PRESENCE OF (INTRAUTERINE) CONTRACEPTIVE DEVICE: ICD-10-CM

## 2022-08-18 DIAGNOSIS — E66.9 OBESITY, UNSPECIFIED: ICD-10-CM

## 2022-08-18 DIAGNOSIS — Z99.81 DEPENDENCE ON SUPPLEMENTAL OXYGEN: ICD-10-CM

## 2022-08-18 DIAGNOSIS — Z90.49 ACQUIRED ABSENCE OF OTHER SPECIFIED PARTS OF DIGESTIVE TRACT: ICD-10-CM

## 2022-08-18 DIAGNOSIS — J45.909 UNSPECIFIED ASTHMA, UNCOMPLICATED: ICD-10-CM

## 2022-08-18 DIAGNOSIS — N93.8 OTHER SPECIFIED ABNORMAL UTERINE AND VAGINAL BLEEDING: ICD-10-CM

## 2022-08-18 DIAGNOSIS — Z88.0 ALLERGY STATUS TO PENICILLIN: ICD-10-CM

## 2022-08-18 DIAGNOSIS — R10.2 PELVIC AND PERINEAL PAIN: ICD-10-CM

## 2022-08-18 DIAGNOSIS — G47.33 OBSTRUCTIVE SLEEP APNEA (ADULT) (PEDIATRIC): ICD-10-CM

## 2022-08-18 DIAGNOSIS — Z98.890 OTHER SPECIFIED POSTPROCEDURAL STATES: Chronic | ICD-10-CM

## 2022-08-18 DIAGNOSIS — N93.9 ABNORMAL UTERINE AND VAGINAL BLEEDING, UNSPECIFIED: ICD-10-CM

## 2022-08-18 DIAGNOSIS — K21.9 GASTRO-ESOPHAGEAL REFLUX DISEASE WITHOUT ESOPHAGITIS: ICD-10-CM

## 2022-08-18 DIAGNOSIS — Z91.018 ALLERGY TO OTHER FOODS: ICD-10-CM

## 2022-08-18 PROCEDURE — 99285 EMERGENCY DEPT VISIT HI MDM: CPT

## 2022-08-18 RX ORDER — ALBUTEROL 90 UG/1
1 AEROSOL, METERED ORAL ONCE
Refills: 0 | Status: COMPLETED | OUTPATIENT
Start: 2022-08-18 | End: 2022-08-18

## 2022-08-18 RX ORDER — KETOROLAC TROMETHAMINE 30 MG/ML
15 SYRINGE (ML) INJECTION ONCE
Refills: 0 | Status: COMPLETED | OUTPATIENT
Start: 2022-08-18 | End: 2022-08-18

## 2022-08-18 RX ORDER — SODIUM CHLORIDE 9 MG/ML
1000 INJECTION INTRAMUSCULAR; INTRAVENOUS; SUBCUTANEOUS ONCE
Refills: 0 | Status: COMPLETED | OUTPATIENT
Start: 2022-08-18 | End: 2022-08-18

## 2022-08-18 RX ADMIN — ALBUTEROL 1 PUFF(S): 90 AEROSOL, METERED ORAL at 22:41

## 2022-08-18 RX ADMIN — SODIUM CHLORIDE 1000 MILLILITER(S): 9 INJECTION INTRAMUSCULAR; INTRAVENOUS; SUBCUTANEOUS at 22:42

## 2022-08-19 VITALS
HEART RATE: 76 BPM | TEMPERATURE: 98 F | OXYGEN SATURATION: 99 % | RESPIRATION RATE: 18 BRPM | DIASTOLIC BLOOD PRESSURE: 75 MMHG | SYSTOLIC BLOOD PRESSURE: 141 MMHG

## 2022-08-19 LAB
ALBUMIN SERPL ELPH-MCNC: 4.1 G/DL — SIGNIFICANT CHANGE UP (ref 3.5–5.2)
ALP SERPL-CCNC: 72 U/L — SIGNIFICANT CHANGE UP (ref 30–115)
ALT FLD-CCNC: 14 U/L — SIGNIFICANT CHANGE UP (ref 0–41)
ANION GAP SERPL CALC-SCNC: 9 MMOL/L — SIGNIFICANT CHANGE UP (ref 7–14)
APPEARANCE UR: CLEAR — SIGNIFICANT CHANGE UP
AST SERPL-CCNC: 13 U/L — SIGNIFICANT CHANGE UP (ref 0–41)
BASOPHILS # BLD AUTO: 0.03 K/UL — SIGNIFICANT CHANGE UP (ref 0–0.2)
BASOPHILS NFR BLD AUTO: 0.4 % — SIGNIFICANT CHANGE UP (ref 0–1)
BILIRUB SERPL-MCNC: <0.2 MG/DL — SIGNIFICANT CHANGE UP (ref 0.2–1.2)
BILIRUB UR-MCNC: NEGATIVE — SIGNIFICANT CHANGE UP
BUN SERPL-MCNC: 15 MG/DL — SIGNIFICANT CHANGE UP (ref 10–20)
CALCIUM SERPL-MCNC: 9.2 MG/DL — SIGNIFICANT CHANGE UP (ref 8.5–10.1)
CHLORIDE SERPL-SCNC: 105 MMOL/L — SIGNIFICANT CHANGE UP (ref 98–110)
CO2 SERPL-SCNC: 26 MMOL/L — SIGNIFICANT CHANGE UP (ref 17–32)
COLOR SPEC: YELLOW — SIGNIFICANT CHANGE UP
CREAT SERPL-MCNC: 0.7 MG/DL — SIGNIFICANT CHANGE UP (ref 0.7–1.5)
DIFF PNL FLD: NEGATIVE — SIGNIFICANT CHANGE UP
EGFR: 119 ML/MIN/1.73M2 — SIGNIFICANT CHANGE UP
EOSINOPHIL # BLD AUTO: 0.08 K/UL — SIGNIFICANT CHANGE UP (ref 0–0.7)
EOSINOPHIL NFR BLD AUTO: 1 % — SIGNIFICANT CHANGE UP (ref 0–8)
GLUCOSE SERPL-MCNC: 108 MG/DL — HIGH (ref 70–99)
GLUCOSE UR QL: NEGATIVE MG/DL — SIGNIFICANT CHANGE UP
HCG SERPL QL: NEGATIVE — SIGNIFICANT CHANGE UP
HCT VFR BLD CALC: 32.8 % — LOW (ref 37–47)
HGB BLD-MCNC: 10.3 G/DL — LOW (ref 12–16)
IMM GRANULOCYTES NFR BLD AUTO: 0.2 % — SIGNIFICANT CHANGE UP (ref 0.1–0.3)
KETONES UR-MCNC: NEGATIVE — SIGNIFICANT CHANGE UP
LEUKOCYTE ESTERASE UR-ACNC: NEGATIVE — SIGNIFICANT CHANGE UP
LYMPHOCYTES # BLD AUTO: 2.87 K/UL — SIGNIFICANT CHANGE UP (ref 1.2–3.4)
LYMPHOCYTES # BLD AUTO: 34.7 % — SIGNIFICANT CHANGE UP (ref 20.5–51.1)
MCHC RBC-ENTMCNC: 24.9 PG — LOW (ref 27–31)
MCHC RBC-ENTMCNC: 31.4 G/DL — LOW (ref 32–37)
MCV RBC AUTO: 79.4 FL — LOW (ref 81–99)
MONOCYTES # BLD AUTO: 0.44 K/UL — SIGNIFICANT CHANGE UP (ref 0.1–0.6)
MONOCYTES NFR BLD AUTO: 5.3 % — SIGNIFICANT CHANGE UP (ref 1.7–9.3)
NEUTROPHILS # BLD AUTO: 4.82 K/UL — SIGNIFICANT CHANGE UP (ref 1.4–6.5)
NEUTROPHILS NFR BLD AUTO: 58.4 % — SIGNIFICANT CHANGE UP (ref 42.2–75.2)
NITRITE UR-MCNC: NEGATIVE — SIGNIFICANT CHANGE UP
NRBC # BLD: 0 /100 WBCS — SIGNIFICANT CHANGE UP (ref 0–0)
PH UR: 6 — SIGNIFICANT CHANGE UP (ref 5–8)
PLATELET # BLD AUTO: 254 K/UL — SIGNIFICANT CHANGE UP (ref 130–400)
POTASSIUM SERPL-MCNC: 4.2 MMOL/L — SIGNIFICANT CHANGE UP (ref 3.5–5)
POTASSIUM SERPL-SCNC: 4.2 MMOL/L — SIGNIFICANT CHANGE UP (ref 3.5–5)
PROT SERPL-MCNC: 6.9 G/DL — SIGNIFICANT CHANGE UP (ref 6–8)
PROT UR-MCNC: NEGATIVE MG/DL — SIGNIFICANT CHANGE UP
RBC # BLD: 4.13 M/UL — LOW (ref 4.2–5.4)
RBC # FLD: 13.8 % — SIGNIFICANT CHANGE UP (ref 11.5–14.5)
SODIUM SERPL-SCNC: 140 MMOL/L — SIGNIFICANT CHANGE UP (ref 135–146)
SP GR SPEC: >=1.03 (ref 1.01–1.03)
UROBILINOGEN FLD QL: 0.2 MG/DL — SIGNIFICANT CHANGE UP
WBC # BLD: 8.26 K/UL — SIGNIFICANT CHANGE UP (ref 4.8–10.8)
WBC # FLD AUTO: 8.26 K/UL — SIGNIFICANT CHANGE UP (ref 4.8–10.8)

## 2022-08-19 PROCEDURE — 76830 TRANSVAGINAL US NON-OB: CPT | Mod: 26

## 2022-08-19 NOTE — ED PROVIDER NOTE - PATIENT PORTAL LINK FT
You can access the FollowMyHealth Patient Portal offered by [facility] by registering at the following website: [email or web address]. By joining [employer]’s FollowMyHealth portal, you will also be able to view your health information using other applications (apps) compatible with our system.

## 2022-08-19 NOTE — ED ADULT NURSE NOTE - NS_SISCREENINGSR_GEN_ALL_ED
Please let the patient know, prescription sent for ketoconazole 2% shampoo, Apply 5 to 10 mL to wet scalp, lather, leave on 3 to 5 minutes, and rinse; use twice weekly for 2 to 4 weeks.   If symptoms not improving after use, recommend referral to Dermatology.    Recommend patient apply over-the-counter Lotrimin or Lamisil cream twice daily for a week.  If symptoms persist and/or worsen to let us know.  Consider referral to Urology.      Negative

## 2022-08-19 NOTE — ED PROVIDER NOTE - NS ED ROS FT
Constitutional: no fever, chills, no recent weight loss, change in appetite or malaise  Eyes: no redness/discharge/pain/vision changes  ENT: no rhinorrhea/ear pain/sore throat  Cardiac: No chest pain, SOB or edema.  Respiratory: No cough or respiratory distress  GI: No nausea, vomiting, diarrhea or abdominal pain.  : See HPI  MS: no pain to back or extremities, no loss of ROM, no weakness  Neuro: No headache or weakness. No LOC.  Skin: No skin rash.  Endocrine: No history of thyroid disease or diabetes.  .

## 2022-08-19 NOTE — ED PROVIDER NOTE - PHYSICAL EXAMINATION
CONSTITUTIONAL: Well-appearing; well-nourished; in no apparent distress.   EYES: PERRL; EOM intact.   CARDIOVASCULAR: Normal S1, S2; no murmurs, rubs, or gallops.   RESPIRATORY: Normal chest excursion with respiration; breath sounds clear and equal bilaterally; no wheezes, rhonchi, or rales.  GI: Normal bowel sounds; non-distended; non-tender; no palpable organomegaly.   : refer to attending note  MS: No calf swelling and tenderness.  SKIN: Normal for age and race; warm; dry; good turgor; no apparent lesions or exudate.   NEURO/PSYCH: A & O x 4; grossly unremarkable.

## 2022-08-19 NOTE — ED PROVIDER NOTE - OBJECTIVE STATEMENT
32 years old G3, P3 (last pregnancy 6 years ago) present complaint abnormal vaginal bleeding started 2 months ago.  Reports she is passing clots and heavier bleeding with pelvic pain over the past 2-week so she comes to ED for evaluations.  Reports she has not seen a GYN for few years.  Reports she has an implanted contraceptive device to left upper arm 6 years ago.  Patient otherwise denies dysuria/urgency/frequency. denies flank pain. Denies fever/chill/HA/dizziness/chest pain/palpitation/sob/abd pain/n/v/d/ black stool/bloody stool.

## 2022-08-19 NOTE — ED PROVIDER NOTE - CARE PROVIDER_API CALL
Jigar Moncada)  Obstetrics and Gynecology  [address] [place] [address]  Phone: [phone]  Fax: [phone]  Follow Up Time:

## 2022-08-19 NOTE — ED PROVIDER NOTE - NS ED ATTENDING STATEMENT MOD
This was a shared visit with the NADEEM. I reviewed and verified the documentation and independently performed the documented:

## 2022-08-19 NOTE — ED ADULT NURSE NOTE - NSIMPLEMENTINTERV_GEN_ALL_ED
Implemented All Universal Safety Interventions:  [place] to call system. Call bell, personal items and telephone within reach. Instruct patient to call for assistance. Room bathroom lighting operational. Non-slip footwear when patient is off stretcher. Physically safe environment: no spills, clutter or unnecessary equipment. Stretcher in lowest position, wheels locked, appropriate side rails in place.

## 2022-08-19 NOTE — ED PROVIDER NOTE - CLINICAL SUMMARY MEDICAL DECISION MAKING FREE TEXT BOX
32y female with implanon has not seen gyn in years with DUB, on exam abd snt, pelvic with blood and clot in vault no pooling os closed no cmt no uterine or adnexal ttp or masses, labs and studies reviewed, will d/c to f/u with gyn. Patient counseled regarding conditions which should prompt return.

## 2022-08-22 ENCOUNTER — LABORATORY RESULT (OUTPATIENT)
Age: 32
End: 2022-08-22

## 2022-08-23 ENCOUNTER — NON-APPOINTMENT (OUTPATIENT)
Age: 32
End: 2022-08-23

## 2022-08-23 ENCOUNTER — APPOINTMENT (OUTPATIENT)
Dept: OBGYN | Facility: CLINIC | Age: 32
End: 2022-08-23

## 2022-08-23 VITALS
HEIGHT: 72 IN | WEIGHT: 290 LBS | SYSTOLIC BLOOD PRESSURE: 121 MMHG | HEART RATE: 79 BPM | DIASTOLIC BLOOD PRESSURE: 82 MMHG | BODY MASS INDEX: 39.28 KG/M2

## 2022-08-23 DIAGNOSIS — Z01.419 ENCOUNTER FOR GYNECOLOGICAL EXAMINATION (GENERAL) (ROUTINE) W/OUT ABNORMAL FINDINGS: ICD-10-CM

## 2022-08-23 DIAGNOSIS — N92.0 EXCESSIVE AND FREQUENT MENSTRUATION WITH REGULAR CYCLE: ICD-10-CM

## 2022-08-23 DIAGNOSIS — N85.2 HYPERTROPHY OF UTERUS: ICD-10-CM

## 2022-08-23 PROCEDURE — 99385 PREV VISIT NEW AGE 18-39: CPT | Mod: 25

## 2022-08-23 PROCEDURE — 76830 TRANSVAGINAL US NON-OB: CPT

## 2022-08-23 NOTE — HISTORY OF PRESENT ILLNESS
[Irregular Menstrual Interval] : irregular menstrual interval [Abnormal Quantity] : abnormal quantity [Abnormal Duration] : abnormal duration [Heavy Bleeding] : heavy bleeding [Frequency: Q ___ days] : menstrual periods occur approximately every [unfilled] days [PGHxTotal] : 3 [[facility]] : 3 [[facility]] : 3 [FreeTextEntry1] : 08/08/2022

## 2022-08-23 NOTE — PROCEDURE
[Transvaginal Ultrasound] : transvaginal ultrasound [FreeTextEntry3] : cervix normal\par  [FreeTextEntry5] : 176cc vol,   2mm thick [FreeTextEntry7] : 7.6cc [FreeTextEntry8] : 7.0cc

## 2022-08-25 LAB — HPV HIGH+LOW RISK DNA PNL CVX: DETECTED

## 2022-08-29 LAB
C TRACH RRNA SPEC QL NAA+PROBE: NOT DETECTED
N GONORRHOEA RRNA SPEC QL NAA+PROBE: NOT DETECTED
SOURCE AMPLIFICATION: NORMAL

## 2022-08-31 NOTE — ASU PREOP CHECKLIST - SITE MARKED BY SURGEON
Quality 110: Preventive Care And Screening: Influenza Immunization: Influenza Immunization previously received during influenza season
Detail Level: Detailed
n/a

## 2022-09-02 ENCOUNTER — NON-APPOINTMENT (OUTPATIENT)
Age: 32
End: 2022-09-02

## 2022-09-06 LAB — CYTOLOGY CVX/VAG DOC THIN PREP: NORMAL

## 2022-09-08 ENCOUNTER — NON-APPOINTMENT (OUTPATIENT)
Age: 32
End: 2022-09-08

## 2022-09-09 ENCOUNTER — NON-APPOINTMENT (OUTPATIENT)
Age: 32
End: 2022-09-09

## 2022-09-27 ENCOUNTER — OUTPATIENT (OUTPATIENT)
Dept: OUTPATIENT SERVICES | Facility: HOSPITAL | Age: 32
LOS: 1 days | Discharge: HOME | End: 2022-09-27

## 2022-09-27 VITALS
HEIGHT: 72 IN | HEART RATE: 59 BPM | SYSTOLIC BLOOD PRESSURE: 119 MMHG | OXYGEN SATURATION: 99 % | DIASTOLIC BLOOD PRESSURE: 74 MMHG | RESPIRATION RATE: 15 BRPM | WEIGHT: 293 LBS | TEMPERATURE: 98 F

## 2022-09-27 DIAGNOSIS — N92.0 EXCESSIVE AND FREQUENT MENSTRUATION WITH REGULAR CYCLE: ICD-10-CM

## 2022-09-27 DIAGNOSIS — Z98.890 OTHER SPECIFIED POSTPROCEDURAL STATES: Chronic | ICD-10-CM

## 2022-09-27 DIAGNOSIS — Z01.818 ENCOUNTER FOR OTHER PREPROCEDURAL EXAMINATION: ICD-10-CM

## 2022-09-27 DIAGNOSIS — Z90.3 ACQUIRED ABSENCE OF STOMACH [PART OF]: Chronic | ICD-10-CM

## 2022-09-27 LAB
ALBUMIN SERPL ELPH-MCNC: 4.1 G/DL — SIGNIFICANT CHANGE UP (ref 3.5–5.2)
ALP SERPL-CCNC: 71 U/L — SIGNIFICANT CHANGE UP (ref 30–115)
ALT FLD-CCNC: 25 U/L — SIGNIFICANT CHANGE UP (ref 0–41)
ANION GAP SERPL CALC-SCNC: 13 MMOL/L — SIGNIFICANT CHANGE UP (ref 7–14)
APTT BLD: 27.2 SEC — SIGNIFICANT CHANGE UP (ref 27–39.2)
AST SERPL-CCNC: 21 U/L — SIGNIFICANT CHANGE UP (ref 0–41)
BASOPHILS # BLD AUTO: 0.03 K/UL — SIGNIFICANT CHANGE UP (ref 0–0.2)
BASOPHILS NFR BLD AUTO: 0.5 % — SIGNIFICANT CHANGE UP (ref 0–1)
BILIRUB SERPL-MCNC: 0.4 MG/DL — SIGNIFICANT CHANGE UP (ref 0.2–1.2)
BUN SERPL-MCNC: 9 MG/DL — LOW (ref 10–20)
CALCIUM SERPL-MCNC: 8.9 MG/DL — SIGNIFICANT CHANGE UP (ref 8.4–10.5)
CHLORIDE SERPL-SCNC: 101 MMOL/L — SIGNIFICANT CHANGE UP (ref 98–110)
CO2 SERPL-SCNC: 24 MMOL/L — SIGNIFICANT CHANGE UP (ref 17–32)
CREAT SERPL-MCNC: 0.7 MG/DL — SIGNIFICANT CHANGE UP (ref 0.7–1.5)
EGFR: 119 ML/MIN/1.73M2 — SIGNIFICANT CHANGE UP
EOSINOPHIL # BLD AUTO: 0.05 K/UL — SIGNIFICANT CHANGE UP (ref 0–0.7)
EOSINOPHIL NFR BLD AUTO: 0.9 % — SIGNIFICANT CHANGE UP (ref 0–8)
GLUCOSE SERPL-MCNC: 91 MG/DL — SIGNIFICANT CHANGE UP (ref 70–99)
HCT VFR BLD CALC: 35.8 % — LOW (ref 37–47)
HGB BLD-MCNC: 10.7 G/DL — LOW (ref 12–16)
IMM GRANULOCYTES NFR BLD AUTO: 0.5 % — HIGH (ref 0.1–0.3)
INR BLD: 0.9 RATIO — SIGNIFICANT CHANGE UP (ref 0.65–1.3)
LYMPHOCYTES # BLD AUTO: 1.96 K/UL — SIGNIFICANT CHANGE UP (ref 1.2–3.4)
LYMPHOCYTES # BLD AUTO: 33.6 % — SIGNIFICANT CHANGE UP (ref 20.5–51.1)
MCHC RBC-ENTMCNC: 23.4 PG — LOW (ref 27–31)
MCHC RBC-ENTMCNC: 29.9 G/DL — LOW (ref 32–37)
MCV RBC AUTO: 78.3 FL — LOW (ref 81–99)
MONOCYTES # BLD AUTO: 0.3 K/UL — SIGNIFICANT CHANGE UP (ref 0.1–0.6)
MONOCYTES NFR BLD AUTO: 5.1 % — SIGNIFICANT CHANGE UP (ref 1.7–9.3)
NEUTROPHILS # BLD AUTO: 3.47 K/UL — SIGNIFICANT CHANGE UP (ref 1.4–6.5)
NEUTROPHILS NFR BLD AUTO: 59.4 % — SIGNIFICANT CHANGE UP (ref 42.2–75.2)
NRBC # BLD: 0 /100 WBCS — SIGNIFICANT CHANGE UP (ref 0–0)
PLATELET # BLD AUTO: 275 K/UL — SIGNIFICANT CHANGE UP (ref 130–400)
POTASSIUM SERPL-MCNC: 4.1 MMOL/L — SIGNIFICANT CHANGE UP (ref 3.5–5)
POTASSIUM SERPL-SCNC: 4.1 MMOL/L — SIGNIFICANT CHANGE UP (ref 3.5–5)
PROT SERPL-MCNC: 6.9 G/DL — SIGNIFICANT CHANGE UP (ref 6–8)
PROTHROM AB SERPL-ACNC: 10.4 SEC — SIGNIFICANT CHANGE UP (ref 9.95–12.87)
RBC # BLD: 4.57 M/UL — SIGNIFICANT CHANGE UP (ref 4.2–5.4)
RBC # FLD: 14.3 % — SIGNIFICANT CHANGE UP (ref 11.5–14.5)
SODIUM SERPL-SCNC: 138 MMOL/L — SIGNIFICANT CHANGE UP (ref 135–146)
WBC # BLD: 5.84 K/UL — SIGNIFICANT CHANGE UP (ref 4.8–10.8)
WBC # FLD AUTO: 5.84 K/UL — SIGNIFICANT CHANGE UP (ref 4.8–10.8)

## 2022-09-27 NOTE — H&P PST ADULT - NSICDXPASTMEDICALHX_GEN_ALL_CORE_FT
PAST MEDICAL HISTORY:  Asthma Last asthma attack as a child    Encounter for surveillance of transdermal patch hormonal contraceptive device     GERD (gastroesophageal reflux disease)     Obesity BMI 50    RYLAND on CPAP NOLONGER SINCE GASTRIC SLEEVE

## 2022-09-27 NOTE — H&P PST ADULT - HISTORY OF PRESENT ILLNESS
PT PRESENTS TO PAST WITH NO SOB, CP, PALPITATIONS, DYSURIA, UTI OR URI AT PRESENT.   PT ABLE TO WALK UP 2-3 FLIGHTS OF STEPS WITH NO SOB.  AS PER THE PT, THIS IS HIS/HER COMPLETE MEDICAL AND SURGICAL HX, INCLUDING MEDICATIONS PRESCRIBED AND OVER THE COUNTER  pt denies any covid s/s, YES 8/2022 tested positive -PT AWARE OF DATE AND TIME OF COVID TESTING PRIOR TO SURGERY.   pt advised self quarantine till day of procedure  denies travel outside the [place] in the past 30 days  Anesthesia Alert  NO--Difficult Airway  NO--History of neck surgery or radiation  NO--Limited ROM of neck  NO--History of Malignant hyperthermia  NO--Personal or family history of Pseudocholinesterase deficiency  NO--Prior Anesthesia Complication  NO--Latex Allergy  NO--Loose teeth  NO--History of Rheumatoid Arthritis  NO--RYLAND  NO BLEEDING RISK  NO--Other_____

## 2022-09-27 NOTE — H&P PST ADULT - REASON FOR ADMISSION
Procedure: PELVIC EXAMINATION UNDER ANESTHESIA, DILATION AND CURETTAGE HYSTEROSCOPY, POSSIBLE SYMPHION, INTRAUTERINE DEVICE INSERTION (MIRENA), NEXPLANON REMOVAL  Procedure: 60 Minutes  PT REPORTS--I AM HAVING THE BIRTH CONTROL IN MY ARM TAKEN OUT.  IT IS CAUSING ME TO HAVE HEAVY PERIODS. THEN THE MODERNA WILL BR PLACED.  I HAVE NO PAIN.  PT FELL DOWN 4 STEPS IN HER HOUSE 9/24/22  AND LANDED ON HER RIGHT KNEE.     PT DID NOT GO TO E.D.   PT STATES- PT DENIES ANY PAIN. PT AMBULATES  TO [facility] USING A CANE. PT ADVISED TO FOLLOW UP WITH A MEDICAL DOCTOR AND INFORM SURGEON IF PAIN OCCURS.   PT ADVISED TO FOLLOW UP Procedure: PELVIC EXAMINATION UNDER ANESTHESIA, DILATION AND CURETTAGE HYSTEROSCOPY, POSSIBLE SYMPHION, INTRAUTERINE DEVICE INSERTION (MIRENA), NEXPLANON REMOVAL  Procedure: 60 Minutes  PT REPORTS--I AM HAVING THE BIRTH CONTROL IN MY ARM TAKEN OUT.  IT IS CAUSING ME TO HAVE HEAVY PERIODS. THEN THE MODERNA WILL BE PLACED.  I HAVE NO PAIN.  PT FELL DOWN 4 STEPS IN HER HOUSE 9/24/22  AND LANDED ON HER RIGHT KNEE.     PT DID NOT GO TO E.D.   PT STATES- I HAVE NO   PAIN. PT AMBULATES  TO [facility] USING A CANE. PT ADVISED TO FOLLOW UP WITH A MEDICAL DOCTOR AND INFORM SURGEON IF PAIN OCCURS.

## 2022-09-28 ENCOUNTER — APPOINTMENT (OUTPATIENT)
Dept: OBGYN | Facility: CLINIC | Age: 32
End: 2022-09-28

## 2022-10-18 ENCOUNTER — APPOINTMENT (OUTPATIENT)
Dept: OBGYN | Facility: CLINIC | Age: 32
End: 2022-10-18

## 2022-10-19 ENCOUNTER — NON-APPOINTMENT (OUTPATIENT)
Age: 32
End: 2022-10-19

## 2022-10-24 ENCOUNTER — NON-APPOINTMENT (OUTPATIENT)
Age: 32
End: 2022-10-24

## 2022-11-01 ENCOUNTER — APPOINTMENT (OUTPATIENT)
Dept: OBGYN | Facility: CLINIC | Age: 32
End: 2022-11-01

## 2022-11-08 ENCOUNTER — NON-APPOINTMENT (OUTPATIENT)
Age: 32
End: 2022-11-08

## 2022-11-15 ENCOUNTER — NON-APPOINTMENT (OUTPATIENT)
Age: 32
End: 2022-11-15

## 2022-12-16 ENCOUNTER — EMERGENCY (EMERGENCY)
Facility: HOSPITAL | Age: 32
LOS: 0 days | Discharge: HOME | End: 2022-12-16
Attending: EMERGENCY MEDICINE | Admitting: EMERGENCY MEDICINE
Payer: MEDICAID

## 2022-12-16 VITALS
TEMPERATURE: 97 F | SYSTOLIC BLOOD PRESSURE: 127 MMHG | DIASTOLIC BLOOD PRESSURE: 87 MMHG | RESPIRATION RATE: 18 BRPM | OXYGEN SATURATION: 99 % | WEIGHT: 293 LBS | HEART RATE: 92 BPM

## 2022-12-16 DIAGNOSIS — H53.8 OTHER VISUAL DISTURBANCES: ICD-10-CM

## 2022-12-16 DIAGNOSIS — K21.9 GASTRO-ESOPHAGEAL REFLUX DISEASE WITHOUT ESOPHAGITIS: ICD-10-CM

## 2022-12-16 DIAGNOSIS — R42 DIZZINESS AND GIDDINESS: ICD-10-CM

## 2022-12-16 DIAGNOSIS — R51.9 HEADACHE, UNSPECIFIED: ICD-10-CM

## 2022-12-16 DIAGNOSIS — Z88.0 ALLERGY STATUS TO PENICILLIN: ICD-10-CM

## 2022-12-16 DIAGNOSIS — G47.33 OBSTRUCTIVE SLEEP APNEA (ADULT) (PEDIATRIC): ICD-10-CM

## 2022-12-16 DIAGNOSIS — Z90.3 ACQUIRED ABSENCE OF STOMACH [PART OF]: Chronic | ICD-10-CM

## 2022-12-16 DIAGNOSIS — Z20.822 CONTACT WITH AND (SUSPECTED) EXPOSURE TO COVID-19: ICD-10-CM

## 2022-12-16 DIAGNOSIS — Z91.018 ALLERGY TO OTHER FOODS: ICD-10-CM

## 2022-12-16 DIAGNOSIS — J45.909 UNSPECIFIED ASTHMA, UNCOMPLICATED: ICD-10-CM

## 2022-12-16 DIAGNOSIS — Z98.890 OTHER SPECIFIED POSTPROCEDURAL STATES: Chronic | ICD-10-CM

## 2022-12-16 DIAGNOSIS — Z99.89 DEPENDENCE ON OTHER ENABLING MACHINES AND DEVICES: ICD-10-CM

## 2022-12-16 DIAGNOSIS — Z98.84 BARIATRIC SURGERY STATUS: ICD-10-CM

## 2022-12-16 LAB
ALBUMIN SERPL ELPH-MCNC: 4 G/DL — SIGNIFICANT CHANGE UP (ref 3.5–5.2)
ALP SERPL-CCNC: 75 U/L — SIGNIFICANT CHANGE UP (ref 30–115)
ALT FLD-CCNC: 27 U/L — SIGNIFICANT CHANGE UP (ref 0–41)
ANION GAP SERPL CALC-SCNC: 8 MMOL/L — SIGNIFICANT CHANGE UP (ref 7–14)
AST SERPL-CCNC: 29 U/L — SIGNIFICANT CHANGE UP (ref 0–41)
BASOPHILS # BLD AUTO: 0.03 K/UL — SIGNIFICANT CHANGE UP (ref 0–0.2)
BASOPHILS NFR BLD AUTO: 0.4 % — SIGNIFICANT CHANGE UP (ref 0–1)
BILIRUB SERPL-MCNC: <0.2 MG/DL — SIGNIFICANT CHANGE UP (ref 0.2–1.2)
BUN SERPL-MCNC: 9 MG/DL — LOW (ref 10–20)
CALCIUM SERPL-MCNC: 8.9 MG/DL — SIGNIFICANT CHANGE UP (ref 8.4–10.5)
CHLORIDE SERPL-SCNC: 101 MMOL/L — SIGNIFICANT CHANGE UP (ref 98–110)
CO2 SERPL-SCNC: 26 MMOL/L — SIGNIFICANT CHANGE UP (ref 17–32)
CREAT SERPL-MCNC: 0.7 MG/DL — SIGNIFICANT CHANGE UP (ref 0.7–1.5)
EGFR: 119 ML/MIN/1.73M2 — SIGNIFICANT CHANGE UP
EOSINOPHIL # BLD AUTO: 0.07 K/UL — SIGNIFICANT CHANGE UP (ref 0–0.7)
EOSINOPHIL NFR BLD AUTO: 1 % — SIGNIFICANT CHANGE UP (ref 0–8)
GLUCOSE SERPL-MCNC: 110 MG/DL — HIGH (ref 70–99)
HCG SERPL QL: NEGATIVE — SIGNIFICANT CHANGE UP
HCT VFR BLD CALC: 32.7 % — LOW (ref 37–47)
HGB BLD-MCNC: 10 G/DL — LOW (ref 12–16)
IMM GRANULOCYTES NFR BLD AUTO: 0.3 % — SIGNIFICANT CHANGE UP (ref 0.1–0.3)
LYMPHOCYTES # BLD AUTO: 2.54 K/UL — SIGNIFICANT CHANGE UP (ref 1.2–3.4)
LYMPHOCYTES # BLD AUTO: 35.3 % — SIGNIFICANT CHANGE UP (ref 20.5–51.1)
MCHC RBC-ENTMCNC: 22.8 PG — LOW (ref 27–31)
MCHC RBC-ENTMCNC: 30.6 G/DL — LOW (ref 32–37)
MCV RBC AUTO: 74.5 FL — LOW (ref 81–99)
MONOCYTES # BLD AUTO: 0.42 K/UL — SIGNIFICANT CHANGE UP (ref 0.1–0.6)
MONOCYTES NFR BLD AUTO: 5.8 % — SIGNIFICANT CHANGE UP (ref 1.7–9.3)
NEUTROPHILS # BLD AUTO: 4.12 K/UL — SIGNIFICANT CHANGE UP (ref 1.4–6.5)
NEUTROPHILS NFR BLD AUTO: 57.2 % — SIGNIFICANT CHANGE UP (ref 42.2–75.2)
NRBC # BLD: 0 /100 WBCS — SIGNIFICANT CHANGE UP (ref 0–0)
PLATELET # BLD AUTO: 271 K/UL — SIGNIFICANT CHANGE UP (ref 130–400)
POTASSIUM SERPL-MCNC: 3.6 MMOL/L — SIGNIFICANT CHANGE UP (ref 3.5–5)
POTASSIUM SERPL-SCNC: 3.6 MMOL/L — SIGNIFICANT CHANGE UP (ref 3.5–5)
PROT SERPL-MCNC: 7.2 G/DL — SIGNIFICANT CHANGE UP (ref 6–8)
RBC # BLD: 4.39 M/UL — SIGNIFICANT CHANGE UP (ref 4.2–5.4)
RBC # FLD: 15.4 % — HIGH (ref 11.5–14.5)
SARS-COV-2 RNA SPEC QL NAA+PROBE: SIGNIFICANT CHANGE UP
SODIUM SERPL-SCNC: 135 MMOL/L — SIGNIFICANT CHANGE UP (ref 135–146)
TROPONIN T SERPL-MCNC: <0.01 NG/ML — SIGNIFICANT CHANGE UP
WBC # BLD: 7.2 K/UL — SIGNIFICANT CHANGE UP (ref 4.8–10.8)
WBC # FLD AUTO: 7.2 K/UL — SIGNIFICANT CHANGE UP (ref 4.8–10.8)

## 2022-12-16 PROCEDURE — 70498 CT ANGIOGRAPHY NECK: CPT | Mod: 26,MA

## 2022-12-16 PROCEDURE — 70496 CT ANGIOGRAPHY HEAD: CPT | Mod: 26,MA

## 2022-12-16 PROCEDURE — 99285 EMERGENCY DEPT VISIT HI MDM: CPT

## 2022-12-16 PROCEDURE — 93010 ELECTROCARDIOGRAM REPORT: CPT

## 2022-12-16 RX ORDER — METOCLOPRAMIDE HCL 10 MG
10 TABLET ORAL ONCE
Refills: 0 | Status: COMPLETED | OUTPATIENT
Start: 2022-12-16 | End: 2022-12-16

## 2022-12-16 RX ORDER — ACETAMINOPHEN 500 MG
975 TABLET ORAL ONCE
Refills: 0 | Status: COMPLETED | OUTPATIENT
Start: 2022-12-16 | End: 2022-12-16

## 2022-12-16 RX ORDER — SODIUM CHLORIDE 9 MG/ML
1000 INJECTION INTRAMUSCULAR; INTRAVENOUS; SUBCUTANEOUS ONCE
Refills: 0 | Status: COMPLETED | OUTPATIENT
Start: 2022-12-16 | End: 2022-12-16

## 2022-12-16 RX ADMIN — Medication 975 MILLIGRAM(S): at 21:34

## 2022-12-16 RX ADMIN — SODIUM CHLORIDE 1000 MILLILITER(S): 9 INJECTION INTRAMUSCULAR; INTRAVENOUS; SUBCUTANEOUS at 21:32

## 2022-12-16 RX ADMIN — Medication 104 MILLIGRAM(S): at 21:32

## 2022-12-16 NOTE — ED PROVIDER NOTE - CLINICAL SUMMARY MEDICAL DECISION MAKING FREE TEXT BOX
patient is not a neuro interventional or tPA candidate based on time of onset being approximately 48 hours clearly more than 24 hours we obtained CT CTA head and neck which were negative given patient IV fluid Reglan and Tylenol patient was reevaluated her neuro exam is consistent in addition patient's symptoms have improved now essentially asymptomatic at this point stable for discharge advised to follow-up with neurology in the next 24 to 48 hours return for any further concerns additionally patient is afebrile less likely secondary to infectious cause troponin negative risk of ACS unlikely

## 2022-12-16 NOTE — ED ADULT TRIAGE NOTE - CHIEF COMPLAINT QUOTE
pt states yesterday she was driving and vision got blurry so she pulled over and remembers her body tensing up, hands curling up and then woke up to  shaking her. pt was seen at urgent care yesterday. pt states today she went to work and felt "normal except for pain in left side of head and tired"

## 2022-12-16 NOTE — ED PROVIDER NOTE - CHIEF COMPLAINT
Patient needs assistance finding psychiatrist in network.; Dr. Shaji Harrington did not take insurance plan. Please call insurance plan to find 2 or 3 psychiatry practices [place] [phone] [address]) and get back to me before we refer:    Dx is bipolar disorder.
Patient will check with insurance to see what doctor is in network. She will advise us when she gets appointment.
The patient is a 32y Female complaining of see chief complaint quote.

## 2022-12-16 NOTE — ED PROVIDER NOTE - PHYSICAL EXAMINATION
GENERAL: Well-nourished, Well-developed. NAD.  HEAD: No visible or palpable bumps or hematomas. No ecchymosis behind ears B/L.  Eyes: PERRLA, EOMI. No asymmetry. No nystagmus. No conjunctival injection. Non-icteric sclera.  ENMT: MMM.   Neck: Supple. FROM  CVS: Normal S1,S2. No murmurs appreciated on auscultation   RESP: No use of accessory muscles. Chest rise symmetrical with good expansion. Lungs clear to auscultation B/L. No wheezing, rales, or rhonchi auscultated.  Skin: Warm, Dry. No rashes or lesions. Good cap refill < 2 sec B/L.  EXT: Radial and pedal pulses present B/L. No calf tenderness or swelling B/L. No palpable cords. No pedal edema B/L.  Neuro: AA&O x 3. CNs II-XII grossly intact. Speaking in full sentences. No slurring of speech. No facial droop. No tremors. Sensation grossly intact. Strength 5/5 B/L. Gait within normal limits. Negative Romberg and Pronator Drift. Normal Finger to nose exam. Visual fields intact.

## 2022-12-16 NOTE — ED PROVIDER NOTE - ATTENDING APP SHARED VISIT CONTRIBUTION OF CARE
I was present for and supervised the key and critical aspects of the procedures performed during the care of the patient. Patient is a 32-year-old female presents for evaluation of headache and dizziness over the past 48 hours headache is moderate left t-sided with no visual changes onset of symptoms approximately 48 hours prior to arrival she got the symptoms while driving a car she was brought to an urgent care center advised to come to the emergency department at that time patient states that she was tired and just wanted to go home to sleep she had continuing symptoms intermittent in nature prompting visit here today denies any visual changes chest pain shortness of breath abdominal pain back pain diaphoresis she denies any numbness tingling of upper extremities    On physical exam patient is normocephalic atraumatic pupils equal round reactive light accommodation extraocular muscles intact oropharynx clear chest clear to auscultation bilaterally abdomen soft nontender nondistended bowel sounds positive extremities full range of motion she is able ambulate neurologically patient has full strength she is alert and oriented x3 no speech abnormalities negative pronator drift    Assessment plan patient is not a neuro interventional or tPA candidate based on time of onset being approximately 48 hours clearly more than 24 hours we obtained CT CTA head and neck which were negative given patient IV fluid Reglan and Tylenol patient was reevaluated her neuro exam is consistent in addition patient's symptoms have improved now essentially asymptomatic at this point stable for discharge advised to follow-up with neurology in the next 24 to 48 hours return for any further concerns additionally patient is afebrile less likely secondary to infectious cause troponin negative risk of ACS unlikely

## 2022-12-16 NOTE — ED PROVIDER NOTE - NS ED ROS FT
Constitutional: No fever, chills.  Eyes:  No visual changes  ENMT:  No neck pain  Cardiac:  No chest pain  Respiratory:  No cough, SOB  GI:  No nausea, vomiting, diarrhea, abdominal pain.  :  No dysuria, hematuria  MS:  No back pain.  Neuro:  (+)HA and dizziness  Skin:  No skin rash  Endocrine: No history of thyroid disease or diabetes.  Except as documented in the HPI,  all other systems are negative.

## 2022-12-16 NOTE — ED PROVIDER NOTE - NSFOLLOWUPCLINICS_GEN_ALL_ED_FT
[facility]  Neurology  [address] [place] [address], [facility] [address]  [place] [address]  Phone: [phone]  Fax:   Follow Up Time: 1-3 Days

## 2022-12-16 NOTE — ED PROVIDER NOTE - PATIENT PORTAL LINK FT
You can access the FollowMyHealth Patient Portal offered by [facility] by registering at the following website: [email or web address]. By joining [employer]’s FollowMyHealth portal, you will also be able to view your health information using other applications (apps) compatible with our system.

## 2022-12-16 NOTE — ED PROVIDER NOTE - NSFOLLOWUPINSTRUCTIONS_ED_ALL_ED_FT
Headache    A headache is pain or discomfort felt around the head or neck area. The specific cause of a headache may not be found as there are many types including tension headaches, migraine headaches, and cluster headaches. Watch your condition for any changes. Things you can do to manage your pain include taking over the counter and prescription medications as instructed by your health care provider, lying down in a dark quiet room, limiting stress, getting regular sleep, and refraining from alcohol and tobacco products.    SEEK IMMEDIATE MEDICAL CARE IF YOU HAVE ANY OF THE FOLLOWING SYMPTOMS: fever, vomiting, stiff neck, loss of vision, problems with speech, muscle weakness, loss of balance, trouble walking, passing out, or confusion.      Our Emergency Department Referral Coordinators will be reaching out ot you in the next 24-48 hours from 9:00am to 5:00pm (Monday to Friday) with a follow up appointment. Please expect a phone call from the hospital in that time frame. If you do not receive a call or if you have any questions or concerns, you can reach them at [phone] or [phone].

## 2022-12-16 NOTE — ED ADULT NURSE NOTE - NSICDXPASTSURGICALHX_GEN_ALL_CORE_FT
PAST SURGICAL HISTORY:  H/O gastric sleeve 7/2021    History of esophagogastroduodenoscopy (EGD)

## 2022-12-16 NOTE — ED PROVIDER NOTE - OBJECTIVE STATEMENT
32-year-old female past medical history RYLAND for evaluation of headache and dizziness x2 days.  Patient reports yesterday at 1 PM while driving she had left-sided headache, felt dizzy, blurry vision.  Patient pulled over at that time was brought to urgent care by EMS, patient evaluated at urgent care and instructed to come to ED, at that time patient states she was tired and wanted to go home and sleep.  Today patient reports she has left-sided headache associated with intermittent dizziness.  Headache is gradual onset.  Denies any modifying factors.  no vision changes at this time. Denies numbness/tingling, weakness, slurred speech, facial droop, chest pain, shortness of breath, nausea, vomiting.

## 2022-12-17 PROBLEM — G47.33 OBSTRUCTIVE SLEEP APNEA (ADULT) (PEDIATRIC): Chronic | Status: ACTIVE | Noted: 2021-06-04

## 2022-12-22 ENCOUNTER — OUTPATIENT (OUTPATIENT)
Dept: OUTPATIENT SERVICES | Facility: HOSPITAL | Age: 32
LOS: 1 days | Discharge: HOME | End: 2022-12-22

## 2022-12-22 DIAGNOSIS — Z98.890 OTHER SPECIFIED POSTPROCEDURAL STATES: Chronic | ICD-10-CM

## 2022-12-22 DIAGNOSIS — Z90.3 ACQUIRED ABSENCE OF STOMACH [PART OF]: Chronic | ICD-10-CM

## 2022-12-30 NOTE — ED ADULT TRIAGE NOTE - CHIEF COMPLAINT QUOTE
BIBA with complaints of flu like symptoms [facility] ENCOUNTER  OFFICE VISIT    CHIEF COMPLAINT:    Chief Complaint   Patient presents with   • Pain       SUBJECTIVE:  Sindy Riggins is a 34 year old female who presented requesting evaluation for left sided rib pain after a fall 3 weeks ago. States she was walking backwards to take a picture, when she tripped and subsequently fell on her left side onto a corner of furniture. Currently rates the pain a 5/10, movement makes it worse. Denies fevers, chills, erythema, edema, ecchymosis, shortness of breath or abdominal pain. Has been taking THC with improvement in pain.     REVIEW OF SYSTEMS:    All other systems are reviewed and are negative except as documented in the history of present illness.    PAST HISTORIES:  I have reviewed the past medical history, family history, social history, medications and allergies listed in the medical record as obtained by my nursing staff and support staff and agree with their documentation.      OBJECTIVE:  PHYSICAL EXAM:    Visit Vitals  /64   Pulse 82   Temp 97.8 °F (36.6 °C) (Tympanic)   Resp 12   Wt 63.5 kg (140 lb)   SpO2 96%   BMI 22.60 kg/m²        CONSTITUTIONAL: Well-hydrated, well-nourished female who appears to be in no acute distress. Awake, alert and cooperative.  HEENT: Head normocephalic and atraumatic.   External ears without deformities. Hearing is grossly normal. Nose without deformities.   NECK: Neck supple.   LUNGS: Breathing unlabored. Lung sounds clear to auscultation bilaterally. No wheezes, rales or rhonchi noted.   CARDIOVASCULAR: Regular rate and rhythm with normal S1 and S2. There are no murmurs auscultated. Tenderness to palpation of left posterior rib overlying 9th rib, no overlying edema, ecchymosis, erythema or bony step-offs.  ABDOMEN: Soft and non-tender.   MUSCULOSKELETAL: Steady gait.   SKIN: Warm, dry, intact without rash or lesion.  NEUROLOGIC: Alert and oriented x3.   PSYCHIATRIC:  Speech and behavior appropriate.  Spaulding

## 2023-01-10 ENCOUNTER — NON-APPOINTMENT (OUTPATIENT)
Age: 33
End: 2023-01-10

## 2023-04-19 ENCOUNTER — APPOINTMENT (OUTPATIENT)
Dept: SURGERY | Facility: CLINIC | Age: 33
End: 2023-04-19
Payer: MEDICAID

## 2023-04-19 VITALS
SYSTOLIC BLOOD PRESSURE: 128 MMHG | OXYGEN SATURATION: 97 % | TEMPERATURE: 97 F | WEIGHT: 293 LBS | HEIGHT: 69.29 IN | BODY MASS INDEX: 42.9 KG/M2 | DIASTOLIC BLOOD PRESSURE: 84 MMHG | HEART RATE: 102 BPM

## 2023-04-19 DIAGNOSIS — Z86.69 PERSONAL HISTORY OF OTHER DISEASES OF THE NERVOUS SYSTEM AND SENSE ORGANS: ICD-10-CM

## 2023-04-19 PROCEDURE — 99213 OFFICE O/P EST LOW 20 MIN: CPT

## 2023-04-19 NOTE — PLAN
[FreeTextEntry1] : She will require the following pre-operative evaluations and testing:\par \par Lab work\par Psychological evaluation\par Diet history\par Gastroenterology evaluation for upper endoscopy\par PMD clearance\par Nutritional evaluation\par Attendance at preoperative support groups\par \par We had a discussion about potential post-operative complications, including but not limited to: bleeding, infection, leak, stricture, blood clots, GERD, problems with anesthesia.  The patient understands and wishes to proceed.\par \par \par

## 2023-04-19 NOTE — PHYSICAL EXAM
[Normal] : affect appropriate [de-identified] : No distress [de-identified] : Nonlabored breathing [de-identified] : S1, S2 [de-identified] : Soft, nontender, nondistended, well-healed laparoscopic incisions [de-identified] : Normal gait

## 2023-04-19 NOTE — ASSESSMENT
[FreeTextEntry1] : 33-year-old female who previously underwent a sleeve gastrectomy with regain of significant amount of weight interested in a conversion to a gastric bypass

## 2023-04-19 NOTE — ED ADULT NURSE NOTE - MODE OF DISCHARGE
This note was copied from a baby's chart. Mother assisted to a comfortable sidelying position and infant placed along maternal contour. MOB taught how to hand express colostrum. Copious drops easily expressed and several fed now via finger to entice baby to latch. Baby guided to attach. Infant intermittent suckling with occasional swallows. Mother taught gentle ways to keep baby awake and nursing at breast during feed. Spoon and syring to bedside with instruction for supplementation of EBM. Additionally a manual hand pump brought to bedside with instruction. Normal  behaviors and output expectations reviewed with mother. Breastfeeding booklet in [place] language provided. Discussed with mother her plan for feeding. Reviewed the benefits of exclusive breast milk feeding during the hospital stay. Informed her of the risks of using formula to supplement in the first few days of life as well as the benefits of successful breast milk feeding; referred her to the Breastfeeding booklet about this information. She acknowledges understanding of information reviewed and states that it is her plan to breastfeed her infant. Will support her choice and offer additional information as needed. Reviewed breastfeeding basics:  How milk is made and normal  breastfeeding behaviors discussed. Supply and demand,  stomach size, early feeding cues, skin to skin bonding with comfortable positioning and baby led latch-on reviewed. How to identify signs of successful breastfeeding sessions reviewed; education on asymetrical latch, signs of effective latching vs shallow, in-effective latching, normal  feeding frequency and duration and expected infant output discussed.   Normal course of breastfeeding discussed including the AAP's recommendation that children receive exclusive breast milk feedings for the first six months of life with breast milk feedings to continue through the first year of life and/or beyond as complimentary table foods are added. Breastfeeding Booklet and Warm line information provided with discussion. Discussed typical  weight loss and the importance of pediatrician appointment within 24-48 hours of discharge, at 2 weeks of life and normalcy of requesting pediatric weight checks as needed in between visits. Hand Expression Education:  Mom taught how to manually hand express her colostrum. Emphasized the importance of providing infant with valuable colostrum as infant rests skin to skin at breast.  Aware to avoid extended periods of non-feeding. Aware to offer 10-20+ drops of colostrum every 2-3 hours until infant is latching and nursing effectively. Taught the rationale behind this low tech but highly effective evidence based practice. Pt will successfully establish breastfeeding by feeding in response to early feeding cues   or wake every 3h, will obtain deep latch, and will keep log of feedings/output. Taught to BF at hunger cues and or q 2-3 hrs and to offer 10-20 drops of hand expressed colostrum at any non-feeds.       Breast Assessment  Left Breast: Small   Left Nipple: Everted, Intact  Right Breast: Small   Right Nipple: Everted, Intact  Breast- Feeding Assessment  Attends Breast-Feeding Classes: No  Breast-Feeding Experience: No  Breast Trauma/Surgery: No  Type/Quality: Good  Lactation Consultant Visits  Breast-Feedings: Good   Mother/Infant Observation  Mother Observation: Breast comfortable, Alignment, Close hold, Holds breast, Lets baby end feeding, Nipple round on release, Recognizes feeding cues, Cramps  Infant Observation: Lips flanged, lower, Lips flanged, upper, Opens mouth, Relaxed after feeding, Latches nipple and aereolae, Breast tissue moves, Audible swallows, Feeding cues  LATCH Documentation  Latch: Repeated attempts, hold nipple in mouth, stimulate to suck  Audible Swallowing: A few with stimulation  Type of Nipple: Everted (after stimulation)  Comfort (Breast/Nipple): Soft/non-tender  Hold (Positioning): Full assist, teach one side, mother does other, staff holds  [facility] Score: 7 Ambulatory

## 2023-04-19 NOTE — HISTORY OF PRESENT ILLNESS
[de-identified] : Ms. Castro is a 33-year-old female who previously underwent a sleeve gastrectomy 2 years ago and was able to lose a significant amount of weight.  She was doing very well and then underwent a devastating loss in her family which caused her to go back to words more emotional eating and she gained most of her weight back.  She has been trying countless diets and exercise plans to get back to her weight she was able to obtain with her sleeve gastrectomy but has struggled to do so in a sustainable fashion.  She is interested in revisional procedures.\par She takes no medications except for her daily vitamins.  She has not had her vitamin levels checked in a while.\par She denies heart problems and is able to walk up 2 flights of stairs easily with no shortness of breath.  Her sleep apnea has completely resolved since having her sleeve gastrectomy and she has no symptoms of this at this time.  She has no reflux symptoms.  Her only medical issue currently is asthma for which she takes an inhaler and Xolair.

## 2023-04-23 ENCOUNTER — APPOINTMENT (OUTPATIENT)
Dept: NEUROPSYCHOLOGY | Facility: CLINIC | Age: 33
End: 2023-04-23

## 2023-04-25 LAB
25(OH)D3 SERPL-MCNC: 18 NG/ML
ALBUMIN SERPL ELPH-MCNC: 4.1 G/DL
ALP BLD-CCNC: 87 U/L
ALT SERPL-CCNC: 31 U/L
ANION GAP SERPL CALC-SCNC: 13 MMOL/L
AST SERPL-CCNC: 22 U/L
BASOPHILS # BLD AUTO: 0.04 K/UL
BASOPHILS NFR BLD AUTO: 0.5 %
BILIRUB SERPL-MCNC: 0.3 MG/DL
BUN SERPL-MCNC: 9 MG/DL
CALCIUM SERPL-MCNC: 9 MG/DL
CALCIUM SERPL-MCNC: 9.2 MG/DL
CHLORIDE SERPL-SCNC: 102 MMOL/L
CHOLEST SERPL-MCNC: 159 MG/DL
CO2 SERPL-SCNC: 24 MMOL/L
CREAT SERPL-MCNC: 0.7 MG/DL
EGFR: 118 ML/MIN/1.73M2
EOSINOPHIL # BLD AUTO: 0.07 K/UL
EOSINOPHIL NFR BLD AUTO: 0.8 %
ESTIMATED AVERAGE GLUCOSE: 140 MG/DL
FERRITIN SERPL-MCNC: 10 NG/ML
FOLATE RBC-MCNC: 892 NG/ML
GLUCOSE SERPL-MCNC: 123 MG/DL
HBA1C MFR BLD HPLC: 6.5 %
HCT VFR BLD CALC: 35 %
HCT VFR BLD CALC: 36 %
HDLC SERPL-MCNC: 57 MG/DL
HGB BLD-MCNC: 10.5 G/DL
IMM GRANULOCYTES NFR BLD AUTO: 0.2 %
IRON SATN MFR SERPL: 7 %
IRON SERPL-MCNC: 33 UG/DL
LDLC SERPL CALC-MCNC: 70 MG/DL
LYMPHOCYTES # BLD AUTO: 2.51 K/UL
LYMPHOCYTES NFR BLD AUTO: 28.8 %
MAN DIFF?: NORMAL
MCHC RBC-ENTMCNC: 23.1 PG
MCHC RBC-ENTMCNC: 30 G/DL
MCV RBC AUTO: 76.9 FL
MONOCYTES # BLD AUTO: 0.47 K/UL
MONOCYTES NFR BLD AUTO: 5.4 %
NEUTROPHILS # BLD AUTO: 5.62 K/UL
NEUTROPHILS NFR BLD AUTO: 64.3 %
NONHDLC SERPL-MCNC: 102 MG/DL
PARATHYROID HORMONE INTACT: 49 PG/ML
PLATELET # BLD AUTO: 265 K/UL
POTASSIUM SERPL-SCNC: 3.8 MMOL/L
PROT SERPL-MCNC: 7.3 G/DL
RBC # BLD: 4.55 M/UL
RBC # FLD: 16.8 %
SODIUM SERPL-SCNC: 139 MMOL/L
T3FREE SERPL-MCNC: 3.01 PG/ML
TIBC SERPL-MCNC: 487 UG/DL
TRIGL SERPL-MCNC: 158 MG/DL
TSH SERPL-ACNC: 1.48 UIU/ML
UIBC SERPL-MCNC: 454 UG/DL
VIT B1 SERPL-MCNC: 98.3 NMOL/L
VIT B12 SERPL-MCNC: 312 PG/ML
WBC # FLD AUTO: 8.73 K/UL

## 2023-04-25 RX ORDER — ERGOCALCIFEROL 1.25 MG/1
1.25 MG CAPSULE, LIQUID FILLED ORAL
Qty: 4 | Refills: 3 | Status: ACTIVE | COMMUNITY
Start: 2023-04-25 | End: 1900-01-01

## 2023-04-28 ENCOUNTER — APPOINTMENT (OUTPATIENT)
Dept: NEUROPSYCHOLOGY | Facility: CLINIC | Age: 33
End: 2023-04-28

## 2023-04-28 ENCOUNTER — OUTPATIENT (OUTPATIENT)
Dept: OUTPATIENT SERVICES | Facility: HOSPITAL | Age: 33
LOS: 1 days | End: 2023-04-28
Payer: MEDICAID

## 2023-04-28 DIAGNOSIS — F50.9 EATING DISORDER, UNSPECIFIED: ICD-10-CM

## 2023-04-28 DIAGNOSIS — Z90.3 ACQUIRED ABSENCE OF STOMACH [PART OF]: Chronic | ICD-10-CM

## 2023-04-28 DIAGNOSIS — Z98.890 OTHER SPECIFIED POSTPROCEDURAL STATES: Chronic | ICD-10-CM

## 2023-04-28 PROCEDURE — 96146 PSYCL/NRPSYC TST AUTO RESULT: CPT | Mod: 95

## 2023-04-29 DIAGNOSIS — F50.9 EATING DISORDER, UNSPECIFIED: ICD-10-CM

## 2023-05-01 ENCOUNTER — APPOINTMENT (OUTPATIENT)
Dept: NEUROPSYCHOLOGY | Facility: HOSPITAL | Age: 33
End: 2023-05-01

## 2023-05-01 ENCOUNTER — APPOINTMENT (OUTPATIENT)
Dept: SURGERY | Facility: CLINIC | Age: 33
End: 2023-05-01

## 2023-05-08 ENCOUNTER — OUTPATIENT (OUTPATIENT)
Dept: OUTPATIENT SERVICES | Facility: HOSPITAL | Age: 33
LOS: 1 days | End: 2023-05-08
Payer: MEDICAID

## 2023-05-08 ENCOUNTER — APPOINTMENT (OUTPATIENT)
Dept: NEUROPSYCHOLOGY | Facility: HOSPITAL | Age: 33
End: 2023-05-08

## 2023-05-08 DIAGNOSIS — Z90.3 ACQUIRED ABSENCE OF STOMACH [PART OF]: Chronic | ICD-10-CM

## 2023-05-08 DIAGNOSIS — Z98.890 OTHER SPECIFIED POSTPROCEDURAL STATES: Chronic | ICD-10-CM

## 2023-05-08 DIAGNOSIS — F50.9 EATING DISORDER, UNSPECIFIED: ICD-10-CM

## 2023-05-08 PROCEDURE — 96130 PSYCL TST EVAL PHYS/QHP 1ST: CPT | Mod: 95

## 2023-05-08 PROCEDURE — 90791 PSYCH DIAGNOSTIC EVALUATION: CPT | Mod: 95

## 2023-05-08 PROCEDURE — 96138 PSYCL/NRPSYC TECH 1ST: CPT | Mod: 95

## 2023-05-08 PROCEDURE — 90837 PSYTX W PT 60 MINUTES: CPT | Mod: 95

## 2023-05-08 PROCEDURE — 96139 PSYCL/NRPSYC TST TECH EA: CPT | Mod: 95

## 2023-05-09 DIAGNOSIS — F50.9 EATING DISORDER, UNSPECIFIED: ICD-10-CM

## 2023-05-16 ENCOUNTER — APPOINTMENT (OUTPATIENT)
Dept: SURGERY | Facility: CLINIC | Age: 33
End: 2023-05-16
Payer: MEDICAID

## 2023-05-16 VITALS — WEIGHT: 293 LBS | HEIGHT: 69.29 IN | BODY MASS INDEX: 42.9 KG/M2

## 2023-05-16 PROCEDURE — 97802 MEDICAL NUTRITION INDIV IN: CPT

## 2023-06-07 ENCOUNTER — EMERGENCY (EMERGENCY)
Facility: HOSPITAL | Age: 33
LOS: 0 days | Discharge: HOME | End: 2023-06-07
Attending: EMERGENCY MEDICINE
Payer: MEDICAID

## 2023-06-07 VITALS
TEMPERATURE: 98 F | HEART RATE: 81 BPM | HEIGHT: 72 IN | WEIGHT: 293 LBS | OXYGEN SATURATION: 97 % | DIASTOLIC BLOOD PRESSURE: 70 MMHG | SYSTOLIC BLOOD PRESSURE: 135 MMHG | RESPIRATION RATE: 18 BRPM

## 2023-06-07 DIAGNOSIS — J06.9 ACUTE UPPER RESPIRATORY INFECTION, UNSPECIFIED: ICD-10-CM

## 2023-06-07 DIAGNOSIS — J45.909 UNSPECIFIED ASTHMA, UNCOMPLICATED: ICD-10-CM

## 2023-06-07 DIAGNOSIS — Z90.3 ACQUIRED ABSENCE OF STOMACH [PART OF]: Chronic | ICD-10-CM

## 2023-06-07 DIAGNOSIS — J02.9 ACUTE PHARYNGITIS, UNSPECIFIED: ICD-10-CM

## 2023-06-07 DIAGNOSIS — G47.33 OBSTRUCTIVE SLEEP APNEA (ADULT) (PEDIATRIC): ICD-10-CM

## 2023-06-07 DIAGNOSIS — K21.9 GASTRO-ESOPHAGEAL REFLUX DISEASE WITHOUT ESOPHAGITIS: ICD-10-CM

## 2023-06-07 DIAGNOSIS — R05.1 ACUTE COUGH: ICD-10-CM

## 2023-06-07 DIAGNOSIS — Z98.84 BARIATRIC SURGERY STATUS: ICD-10-CM

## 2023-06-07 DIAGNOSIS — Z91.018 ALLERGY TO OTHER FOODS: ICD-10-CM

## 2023-06-07 DIAGNOSIS — Z98.890 OTHER SPECIFIED POSTPROCEDURAL STATES: Chronic | ICD-10-CM

## 2023-06-07 DIAGNOSIS — Z88.0 ALLERGY STATUS TO PENICILLIN: ICD-10-CM

## 2023-06-07 PROCEDURE — 94640 AIRWAY INHALATION TREATMENT: CPT

## 2023-06-07 PROCEDURE — 99283 EMERGENCY DEPT VISIT LOW MDM: CPT | Mod: 25

## 2023-06-07 PROCEDURE — 99284 EMERGENCY DEPT VISIT MOD MDM: CPT

## 2023-06-07 RX ORDER — DEXAMETHASONE 0.5 MG/5ML
10 ELIXIR ORAL ONCE
Refills: 0 | Status: COMPLETED | OUTPATIENT
Start: 2023-06-07 | End: 2023-06-07

## 2023-06-07 RX ORDER — IPRATROPIUM/ALBUTEROL SULFATE 18-103MCG
3 AEROSOL WITH ADAPTER (GRAM) INHALATION ONCE
Refills: 0 | Status: COMPLETED | OUTPATIENT
Start: 2023-06-07 | End: 2023-06-07

## 2023-06-07 RX ADMIN — Medication 3 MILLILITER(S): at 04:30

## 2023-06-07 RX ADMIN — Medication 300 MILLIGRAM(S): at 04:30

## 2023-06-07 RX ADMIN — Medication 10 MILLIGRAM(S): at 04:30

## 2023-06-07 NOTE — ED PROVIDER NOTE - OBJECTIVE STATEMENT
33-year-old female no significant past medical history comes to the emergency room for sore throat x4 days.  Patient states that she was seen by outpatient urgent care at that time was placed on a Medrol Dosepak and told she had some wheezing and was to follow-up with primary care doctor.  Patient states she came to the ER today due to persistent cough and worsening sore throat.  Positive chills no no fever no neck pain

## 2023-06-07 NOTE — ED PROVIDER NOTE - PHYSICAL EXAMINATION
Physical Exam    Vital Signs: I have reviewed the initial vital signs.  Constitutional: well-nourished, appears stated age, no acute distress  Eyes: Conjunctiva pink, Sclera clear, PERRLA, EOMI.  ears: TMs clear  throat: no redness no swelling, uvula midline, no exudates   Cardiovascular: S1 and S2, regular rate, regular rhythm, well-perfused extremities, radial pulses equal and 2+  Respiratory: unlabored respiratory effort, clear to auscultation bilaterally no wheezing, rales and rhonchi  Gastrointestinal: soft, non-tender abdomen, no pulsatile mass, normal bowl sounds  Musculoskeletal: supple neck, no lower extremity edema, no midline tenderness  Integumentary: warm, dry, no rash  Neurologic: awake, alert  Psychiatric: appropriate mood, appropriate affect

## 2023-06-07 NOTE — ED PROVIDER NOTE - PATIENT PORTAL LINK FT
You can access the FollowMyHealth Patient Portal offered by [facility] by registering at the following website: [email or web address]. By joining [email or web address]’s FollowMyHealth portal, you will also be able to view your health information using other applications (apps) compatible with our system.

## 2023-06-07 NOTE — ED ADULT NURSE NOTE - NSFALLHARMRISKINTERV_ED_ALL_ED

## 2023-06-07 NOTE — ED ADULT NURSE NOTE - FINAL NURSING ELECTRONIC SIGNATURE
Validate Note Data When Using Inventory: Yes Lot # (Optional): [id number] Detail Level: Detailed Medical Necessity Clause: This procedure was medically necessary because the lesions that were treated were: X Size Of Lesion In Cm (Optional): 0 Expiration Date (Optional): 06/2022 Administered By (Optional): Tessie Aguirre MD Consent: The risks of atrophy were reviewed with the patient. Total Volume Injected (Ccs- Only Use Numbers And Decimals): 0.2 Concentration Of Solution Injected (Mg/Ml): 5.0 Kenalog Preparation: Kenalog Include Z78.9 (Other Specified Conditions Influencing Health Status) As An Associated Diagnosis?: No Ndc# For Kenalog Only: [id number] 07-Jun-2023 04:52

## 2023-06-07 NOTE — ED PROVIDER NOTE - NSFOLLOWUPINSTRUCTIONS_ED_ALL_ED_FT
Follow-up with your primary care doctor and ENT doctor in 1 to 2 days.    Upper Respiratory Infection, Adult  An upper respiratory infection (URI) is a common viral infection of the nose, throat, and upper air passages that lead to the lungs. The most common type of URI is the common cold. URIs usually get better on their own, without medical treatment.    What are the causes?  A URI is caused by a virus. You may catch a virus by:    Breathing in droplets from an infected person's cough or sneeze.  Touching something that has been exposed to the virus (contaminated) and then touching your mouth, nose, or eyes.    What increases the risk?  You are more likely to get a URI if:    You are very young or very old.  It is isabela or winter.  You have close contact with others, such as at a , school, or health care facility.  You smoke.  You have long-term (chronic) heart or lung disease.  You have a weakened disease-fighting (immune) system.  You have nasal allergies or asthma.  You are experiencing a lot of stress.  You work in an area that has poor air circulation.  You have poor nutrition.    What are the signs or symptoms?  A URI usually involves some of the following symptoms:    Runny or stuffy (congested) nose.  Sneezing.  Cough.  Sore throat.  Headache.  Fatigue.  Fever.  Loss of appetite.  Pain in your forehead, behind your eyes, and over your cheekbones (sinus pain).  Muscle aches.  Redness or irritation of the eyes.  Pressure in the ears or face.    How is this diagnosed?  This condition may be diagnosed based on your medical history and symptoms, and a physical exam. Your health care provider may use a cotton swab to take a mucus sample from your nose (nasal swab). This sample can be tested to determine what virus is causing the illness.    How is this treated?  URIs usually get better on their own within 7–10 days. You can take steps at home to relieve your symptoms. Medicines cannot cure URIs, but your health care provider may recommend certain medicines to help relieve symptoms, such as:    Over-the-counter cold medicines.  Cough suppressants. Coughing is a type of defense against infection that helps to clear the respiratory system, so take these medicines only as recommended by your health care provider.  Fever-reducing medicines.    Follow these instructions at home:  Activity     Rest as needed.  If you have a fever, stay home from work or school until your fever is gone or until your health care provider says you are no longer contagious. Your health care provider may have you wear a face mask to prevent your infection from spreading.  Relieving symptoms     Gargle with a salt-water mixture 3–4 times a day or as needed. To make a salt-water mixture, completely dissolve ½–1 tsp of salt in 1 cup of warm water.  Use a cool-mist humidifier to add moisture to the air. This can help you breathe more easily.  Eating and drinking     Drink enough fluid to keep your urine pale yellow.  ImageEat soups and other clear broths.  General instructions     Take over-the-counter and prescription medicines only as told by your health care provider. These include cold medicines, fever reducers, and cough suppressants.  Do not use any products that contain nicotine or tobacco, such as cigarettes and e-cigarettes. If you need help quitting, ask your health care provider.   Stay away from secondhand smoke.  Stay up to date on all immunizations, including the yearly (annual) flu vaccine.  ImageKeep all follow-up visits as told by your health care provider. This is important.  How to prevent the spread of infection to others     ImageURIs can be passed from person to person (are contagious). To prevent the infection from spreading:    Wash your hands often with soap and water. If soap and water are not available, use hand .  Avoid touching your mouth, face, eyes, or nose.  Cough or sneeze into a tissue or your sleeve or elbow instead of into your hand or into the air.    Contact a health care provider if:  You are getting worse instead of better.  You have a fever or chills.  Your mucus is brown or red.  You have yellow or brown discharge coming from your nose.  You have pain in your face, especially when you bend forward.  You have swollen neck glands.  You have pain while swallowing.  You have white areas in the back of your throat.  Get help right away if:  You have shortness of breath that gets worse.  You have severe or persistent:    Headache.  Ear pain.  Sinus pain.  Chest pain.    You have chronic lung disease along with any of the following:    Wheezing.  Prolonged cough.  Coughing up blood.  A change in your usual mucus.    You have a stiff neck.  You have changes in your:    Vision.  Hearing.  Thinking.  Mood.    Summary  An upper respiratory infection (URI) is a common infection of the nose, throat, and upper air passages that lead to the lungs.  A URI is caused by a virus.  URIs usually get better on their own within 7–10 days.  Medicines cannot cure URIs, but your health care provider may recommend certain medicines to help relieve symptoms.

## 2023-06-07 NOTE — ED PROVIDER NOTE - CARE PROVIDER_API CALL
Ramon Aviles  Otolaryngology  [address] [place] [id number]  Phone: [phone]  Fax: [phone]  Follow Up Time:

## 2023-06-13 ENCOUNTER — APPOINTMENT (OUTPATIENT)
Dept: SURGERY | Facility: CLINIC | Age: 33
End: 2023-06-13

## 2023-06-13 ENCOUNTER — APPOINTMENT (OUTPATIENT)
Dept: SURGERY | Facility: CLINIC | Age: 33
End: 2023-06-13
Payer: MEDICAID

## 2023-06-13 VITALS — HEIGHT: 69.29 IN | WEIGHT: 293 LBS | BODY MASS INDEX: 42.9 KG/M2

## 2023-06-13 PROCEDURE — 97803 MED NUTRITION INDIV SUBSEQ: CPT | Mod: 95

## 2023-06-20 ENCOUNTER — APPOINTMENT (OUTPATIENT)
Dept: SURGERY | Facility: CLINIC | Age: 33
End: 2023-06-20
Payer: MEDICAID

## 2023-06-20 VITALS — HEIGHT: 69.29 IN | WEIGHT: 293 LBS | BODY MASS INDEX: 42.9 KG/M2

## 2023-06-20 PROCEDURE — 97803 MED NUTRITION INDIV SUBSEQ: CPT | Mod: 95

## 2023-07-06 ENCOUNTER — APPOINTMENT (OUTPATIENT)
Dept: SURGERY | Facility: CLINIC | Age: 33
End: 2023-07-06
Payer: MEDICAID

## 2023-07-06 VITALS — HEIGHT: 69.29 IN | BODY MASS INDEX: 42.9 KG/M2 | WEIGHT: 293 LBS

## 2023-07-06 PROCEDURE — 97803 MED NUTRITION INDIV SUBSEQ: CPT | Mod: 95

## 2023-07-12 ENCOUNTER — NON-APPOINTMENT (OUTPATIENT)
Age: 33
End: 2023-07-12

## 2023-07-13 ENCOUNTER — NON-APPOINTMENT (OUTPATIENT)
Age: 33
End: 2023-07-13

## 2023-07-25 RX ORDER — OMEPRAZOLE 40 MG/1
40 CAPSULE, DELAYED RELEASE ORAL
Qty: 30 | Refills: 2 | Status: ACTIVE | COMMUNITY
Start: 2023-07-25 | End: 1900-01-01

## 2023-07-25 RX ORDER — CELECOXIB 200 MG/1
200 CAPSULE ORAL
Qty: 4 | Refills: 0 | Status: ACTIVE | COMMUNITY
Start: 2023-07-25 | End: 1900-01-01

## 2023-07-27 ENCOUNTER — NON-APPOINTMENT (OUTPATIENT)
Age: 33
End: 2023-07-27

## 2023-07-28 ENCOUNTER — APPOINTMENT (OUTPATIENT)
Dept: SURGERY | Facility: CLINIC | Age: 33
End: 2023-07-28
Payer: MEDICAID

## 2023-07-28 VITALS
DIASTOLIC BLOOD PRESSURE: 76 MMHG | OXYGEN SATURATION: 99 % | BODY MASS INDEX: 42.9 KG/M2 | HEART RATE: 86 BPM | WEIGHT: 293 LBS | TEMPERATURE: 97 F | HEIGHT: 69.29 IN | SYSTOLIC BLOOD PRESSURE: 127 MMHG

## 2023-07-28 DIAGNOSIS — K21.9 GASTRO-ESOPHAGEAL REFLUX DISEASE W/OUT ESOPHAGITIS: ICD-10-CM

## 2023-07-28 PROCEDURE — 99215 OFFICE O/P EST HI 40 MIN: CPT

## 2023-07-28 RX ORDER — ENOXAPARIN SODIUM 40 MG/.4ML
40 INJECTION, SOLUTION SUBCUTANEOUS DAILY
Qty: 28 | Refills: 0 | Status: ACTIVE | COMMUNITY
Start: 2023-07-28 | End: 1900-01-01

## 2023-07-28 NOTE — ASSESSMENT
[FreeTextEntry1] : 33-year-old female who previously underwent a sleeve gastrectomy with regain of significant amount of weight interested in a conversion to a gastric bypass-here for her preoperative appointment for a conversion to a gastric bypass

## 2023-07-28 NOTE — PLAN
[FreeTextEntry1] : Saw by the nurse practitioner today and myself.  We went over the pre and postoperative instructions in detail.  She will receive a phone call from our nutritionist as he is not here today.  Discussed the risks and benefits in detail including bleeding, infection, damage to surrounding structures, blood clots, risk of anesthesia and death.  Patient agreed to surgery.  All of her questions were answered.

## 2023-07-28 NOTE — PHYSICAL EXAM
[Normal] : affect appropriate [de-identified] : No distress [de-identified] : Nonlabored breathing [de-identified] : S1, S2 [de-identified] : Soft, nontender, nondistended, well-healed laparoscopic incisions [de-identified] : Normal gait

## 2023-07-28 NOTE — HISTORY OF PRESENT ILLNESS
[de-identified] : Ms. Castro is a 33-year-old female who previously underwent a sleeve gastrectomy 2 years ago and was able to lose a significant amount of weight.  She was doing very well and then underwent a devastating loss in her family which caused her to go back to words more emotional eating and she gained most of her weight back.  She has been trying countless diets and exercise plans to get back to her weight she was able to obtain with her sleeve gastrectomy but has struggled to do so in a sustainable fashion.  She is interested in revisional procedure. \par She is here today for her preoperative appointment after receiving all of her clearances for a conversion to a Claudette-en-Y gastric bypass.  The endoscopy showed a small hiatal hernia and mild gastritis.

## 2023-08-08 ENCOUNTER — OUTPATIENT (OUTPATIENT)
Dept: OUTPATIENT SERVICES | Facility: HOSPITAL | Age: 33
LOS: 1 days | End: 2023-08-08
Payer: MEDICAID

## 2023-08-08 VITALS
RESPIRATION RATE: 16 BRPM | HEART RATE: 94 BPM | SYSTOLIC BLOOD PRESSURE: 136 MMHG | HEIGHT: 72 IN | DIASTOLIC BLOOD PRESSURE: 84 MMHG | OXYGEN SATURATION: 97 % | TEMPERATURE: 96 F | WEIGHT: 293 LBS

## 2023-08-08 DIAGNOSIS — E66.01 MORBID (SEVERE) OBESITY DUE TO EXCESS CALORIES: ICD-10-CM

## 2023-08-08 DIAGNOSIS — Z98.890 OTHER SPECIFIED POSTPROCEDURAL STATES: Chronic | ICD-10-CM

## 2023-08-08 DIAGNOSIS — Z90.3 ACQUIRED ABSENCE OF STOMACH [PART OF]: Chronic | ICD-10-CM

## 2023-08-08 DIAGNOSIS — Z01.818 ENCOUNTER FOR OTHER PREPROCEDURAL EXAMINATION: ICD-10-CM

## 2023-08-08 LAB
A1C WITH ESTIMATED AVERAGE GLUCOSE RESULT: 7.7 % — HIGH (ref 4–5.6)
ALBUMIN SERPL ELPH-MCNC: 4.1 G/DL — SIGNIFICANT CHANGE UP (ref 3.5–5.2)
ALP SERPL-CCNC: 88 U/L — SIGNIFICANT CHANGE UP (ref 30–115)
ALT FLD-CCNC: 26 U/L — SIGNIFICANT CHANGE UP (ref 0–41)
ANION GAP SERPL CALC-SCNC: 12 MMOL/L — SIGNIFICANT CHANGE UP (ref 7–14)
APTT BLD: 26.9 SEC — LOW (ref 27–39.2)
AST SERPL-CCNC: 22 U/L — SIGNIFICANT CHANGE UP (ref 0–41)
BASOPHILS # BLD AUTO: 0.03 K/UL — SIGNIFICANT CHANGE UP (ref 0–0.2)
BASOPHILS NFR BLD AUTO: 0.4 % — SIGNIFICANT CHANGE UP (ref 0–1)
BILIRUB SERPL-MCNC: 0.3 MG/DL — SIGNIFICANT CHANGE UP (ref 0.2–1.2)
BLD GP AB SCN SERPL QL: SIGNIFICANT CHANGE UP
BUN SERPL-MCNC: 8 MG/DL — LOW (ref 10–20)
CALCIUM SERPL-MCNC: 8.6 MG/DL — SIGNIFICANT CHANGE UP (ref 8.4–10.5)
CHLORIDE SERPL-SCNC: 101 MMOL/L — SIGNIFICANT CHANGE UP (ref 98–110)
CO2 SERPL-SCNC: 24 MMOL/L — SIGNIFICANT CHANGE UP (ref 17–32)
CREAT SERPL-MCNC: 0.6 MG/DL — LOW (ref 0.7–1.5)
EGFR: 122 ML/MIN/1.73M2 — SIGNIFICANT CHANGE UP
EOSINOPHIL # BLD AUTO: 0.07 K/UL — SIGNIFICANT CHANGE UP (ref 0–0.7)
EOSINOPHIL NFR BLD AUTO: 0.8 % — SIGNIFICANT CHANGE UP (ref 0–8)
ESTIMATED AVERAGE GLUCOSE: 174 MG/DL — HIGH (ref 68–114)
GLUCOSE SERPL-MCNC: 144 MG/DL — HIGH (ref 70–99)
HCT VFR BLD CALC: 33.7 % — LOW (ref 37–47)
HGB BLD-MCNC: 10.4 G/DL — LOW (ref 12–16)
IMM GRANULOCYTES NFR BLD AUTO: 0.2 % — SIGNIFICANT CHANGE UP (ref 0.1–0.3)
INR BLD: 0.86 RATIO — SIGNIFICANT CHANGE UP (ref 0.65–1.3)
LYMPHOCYTES # BLD AUTO: 2.59 K/UL — SIGNIFICANT CHANGE UP (ref 1.2–3.4)
LYMPHOCYTES # BLD AUTO: 31.4 % — SIGNIFICANT CHANGE UP (ref 20.5–51.1)
MCHC RBC-ENTMCNC: 22.8 PG — LOW (ref 27–31)
MCHC RBC-ENTMCNC: 30.9 G/DL — LOW (ref 32–37)
MCV RBC AUTO: 73.7 FL — LOW (ref 81–99)
MONOCYTES # BLD AUTO: 0.45 K/UL — SIGNIFICANT CHANGE UP (ref 0.1–0.6)
MONOCYTES NFR BLD AUTO: 5.5 % — SIGNIFICANT CHANGE UP (ref 1.7–9.3)
NEUTROPHILS # BLD AUTO: 5.08 K/UL — SIGNIFICANT CHANGE UP (ref 1.4–6.5)
NEUTROPHILS NFR BLD AUTO: 61.7 % — SIGNIFICANT CHANGE UP (ref 42.2–75.2)
NRBC # BLD: 0 /100 WBCS — SIGNIFICANT CHANGE UP (ref 0–0)
PLATELET # BLD AUTO: 264 K/UL — SIGNIFICANT CHANGE UP (ref 130–400)
PMV BLD: 10.3 FL — SIGNIFICANT CHANGE UP (ref 7.4–10.4)
POTASSIUM SERPL-MCNC: 4.3 MMOL/L — SIGNIFICANT CHANGE UP (ref 3.5–5)
POTASSIUM SERPL-SCNC: 4.3 MMOL/L — SIGNIFICANT CHANGE UP (ref 3.5–5)
PROT SERPL-MCNC: 6.8 G/DL — SIGNIFICANT CHANGE UP (ref 6–8)
PROTHROM AB SERPL-ACNC: 9.8 SEC — LOW (ref 9.95–12.87)
RBC # BLD: 4.57 M/UL — SIGNIFICANT CHANGE UP (ref 4.2–5.4)
RBC # FLD: 16.2 % — HIGH (ref 11.5–14.5)
SODIUM SERPL-SCNC: 137 MMOL/L — SIGNIFICANT CHANGE UP (ref 135–146)
WBC # BLD: 8.24 K/UL — SIGNIFICANT CHANGE UP (ref 4.8–10.8)
WBC # FLD AUTO: 8.24 K/UL — SIGNIFICANT CHANGE UP (ref 4.8–10.8)

## 2023-08-08 PROCEDURE — 86850 RBC ANTIBODY SCREEN: CPT

## 2023-08-08 PROCEDURE — 99214 OFFICE O/P EST MOD 30 MIN: CPT | Mod: 25

## 2023-08-08 PROCEDURE — 86901 BLOOD TYPING SEROLOGIC RH(D): CPT

## 2023-08-08 PROCEDURE — 86900 BLOOD TYPING SEROLOGIC ABO: CPT

## 2023-08-08 PROCEDURE — 85610 PROTHROMBIN TIME: CPT

## 2023-08-08 PROCEDURE — 80053 COMPREHEN METABOLIC PANEL: CPT

## 2023-08-08 PROCEDURE — 36415 COLL VENOUS BLD VENIPUNCTURE: CPT

## 2023-08-08 PROCEDURE — 85025 COMPLETE CBC W/AUTO DIFF WBC: CPT

## 2023-08-08 PROCEDURE — 93010 ELECTROCARDIOGRAM REPORT: CPT

## 2023-08-08 PROCEDURE — 83036 HEMOGLOBIN GLYCOSYLATED A1C: CPT

## 2023-08-08 PROCEDURE — 85730 THROMBOPLASTIN TIME PARTIAL: CPT

## 2023-08-08 PROCEDURE — 93005 ELECTROCARDIOGRAM TRACING: CPT

## 2023-08-08 RX ORDER — OMEPRAZOLE 10 MG/1
1 CAPSULE, DELAYED RELEASE ORAL
Qty: 0 | Refills: 0 | DISCHARGE

## 2023-08-08 RX ORDER — ALBUTEROL 90 UG/1
2 AEROSOL, METERED ORAL
Qty: 0 | Refills: 0 | DISCHARGE

## 2023-08-08 NOTE — H&P PST ADULT - NSICDXPASTMEDICALHX_GEN_ALL_CORE_FT
PAST MEDICAL HISTORY:  Asthma Last asthma attack as a child    Encounter for surveillance of transdermal patch hormonal contraceptive device     GERD (gastroesophageal reflux disease)     Obesity BMI 50    RYLAND on CPAP NOLONGER SINCE GASTRIC SLEEVE     PAST MEDICAL HISTORY:  Asthma     Encounter for surveillance of transdermal patch hormonal contraceptive device     GERD (gastroesophageal reflux disease)     Obesity BMI 50    RYLAND on CPAP NOLONGER SINCE GASTRIC SLEEVE

## 2023-08-08 NOTE — H&P PST ADULT - HISTORY OF PRESENT ILLNESS
33yr old female with morbid obesity states " the gastric sleeve did not work for me , I want o lose more weight , I used  to weigh more than 400lbs before" -opts to have ROBOTIC GASTRIC BYPASS. Reports sever cough for the last 3-4 days " that I lost my voice" -Denies fever, nasal congestion , sore throat, or malaise". Recd COVID vaccine. Verbalized understanding of COVID prevention measures. Exercise bandar 2 FOS. H/O RYLAND - " I don't have it now after the sleeve surgery", did not have repeat sleep study.  Anesthesia Alert  NO--Difficult Airway  NO--History of neck surgery or radiation  NO--Limited ROM of neck  NO--History of Malignant hyperthermia  NO--Personal or family history of Pseudocholinesterase deficiency  NO--Prior Anesthesia Complication  NO--Latex Allergy  NO--Loose teeth  NO--History of Rheumatoid Arthritis  YES--RYLAND  No Bleeding risk  NO--Other_____   Revised Cardiac Risk Index for Pre-Operative Risk from [employer]  on 8/8/2023  ** All calculations should be rechecked by clinician prior to use **    RESULT SUMMARY:  0 points  Class I Risk    3.9 %  30-day risk of death, MI, or cardiac arrest    From [place] 2017, based on pooled data from 5 high quality external validations (4 prospective). These numbers are higher than those often quoted from the now-outdated original study (Mike 1999). See Evidence for details.      INPUTS:  Elevated-risk surgery —> 0 = No  History of ischemic heart disease —> 0 = No  History of congestive heart failure —> 0 = No  History of cerebrovascular disease —> 0 = No  Pre-operative treatment with insulin —> 0 = No  Pre-operative creatinine >2 mg/dL / 176.8 µmol/L —> 0 = No  Duke Activity Status Index (DASI) from [employer]  on 8/8/2023  ** All calculations should be rechecked by clinician prior to use **    RESULT SUMMARY:  28.7 points  The higher the score (maximum 58.2), the higher the functional status.    6.27 METs        INPUTS:  Take care of self —> 2.75 = Yes  Walk indoors —> 1.75 = Yes  Walk 1&ndash;2 blocks on level ground —> 2.75 = Yes  Climb a flight of stairs or walk up a hill —> 5.5 = Yes  Run a short distance —> 0 = No  Do light work around the house —> 2.7 = Yes  Do moderate work around the house —> 3.5 = Yes  Do heavy work around the house —> 0 = No  Do yardwork —> 4.5 = Yes  Have sexual relations —> 5.25 = Yes  Participate in moderate recreational activities —> 0 = No  Participate in strenuous sports —> 0 = No   33yr old female with morbid obesity states " the gastric sleeve did not work for me , I want o lose more weight , I used  to weigh more than 400lbs before" -opts to have ROBOTIC GASTRIC BYPASS. Reports severe cough for the last 3-4 days " that I lost my voice" -Denies fever, nasal congestion , sore throat, or malaise". Recd COVID vaccine. Verbalized understanding of COVID prevention measures. Exercise bandar 2 FOS. H/O RYLAND - " I don't have it now after the sleeve surgery", did not have repeat sleep study. H/o asthma uses Albuterol about 3-4 times/d.  Anesthesia Alert  NO--Difficult Airway  NO--History of neck surgery or radiation  NO--Limited ROM of neck  NO--History of Malignant hyperthermia  NO--Personal or family history of Pseudocholinesterase deficiency  NO--Prior Anesthesia Complication  NO--Latex Allergy  NO--Loose teeth  NO--History of Rheumatoid Arthritis  YES--RYLAND  No Bleeding risk  NO--Other_____   Revised Cardiac Risk Index for Pre-Operative Risk from [employer]  on 8/8/2023  ** All calculations should be rechecked by clinician prior to use **    RESULT SUMMARY:  0 points  Class I Risk    3.9 %  30-day risk of death, MI, or cardiac arrest    From [place] 2017, based on pooled data from 5 high quality external validations (4 prospective). These numbers are higher than those often quoted from the now-outdated original study (Mike 1999). See Evidence for details.      INPUTS:  Elevated-risk surgery —> 0 = No  History of ischemic heart disease —> 0 = No  History of congestive heart failure —> 0 = No  History of cerebrovascular disease —> 0 = No  Pre-operative treatment with insulin —> 0 = No  Pre-operative creatinine >2 mg/dL / 176.8 µmol/L —> 0 = No  D[facility] Activity Status Index (DASI) from [employer]  on 8/8/2023  ** All calculations should be rechecked by clinician prior to use **    RESULT SUMMARY:  28.7 points  The higher the score (maximum 58.2), the higher the functional status.    6.27 METs        INPUTS:  Take care of self —> 2.75 = Yes  Walk indoors —> 1.75 = Yes  Walk 1&ndash;2 blocks on level ground —> 2.75 = Yes  Climb a flight of stairs or walk up a hill —> 5.5 = Yes  Run a short distance —> 0 = No  Do light work around the house —> 2.7 = Yes  Do moderate work around the house —> 3.5 = Yes  Do heavy work around the house —> 0 = No  Do yardwork —> 4.5 = Yes  Have sexual relations —> 5.25 = Yes  Participate in moderate recreational activities —> 0 = No  Participate in strenuous sports —> 0 = No   33yr old female with morbid obesity states " the gastric sleeve did not work for me , I want o lose more weight , I used  to weigh more than 400lbs before" -opts to have ROBOTIC GASTRIC BYPASS. Reports severe cough for the last 3-4 days " that I lost my voice" -Denies fever, nasal congestion , sore throat, or malaise". Recd COVID vaccine. Verbalized understanding of COVID prevention measures. Exercise bandar 2 FOS. H/O RYLAND - " I don't have it now after the sleeve surgery", did not have repeat sleep study. H/o asthma uses Albuterol about 3-4 times/week  Anesthesia Alert  NO--Difficult Airway  NO--History of neck surgery or radiation  NO--Limited ROM of neck  NO--History of Malignant hyperthermia  NO--Personal or family history of Pseudocholinesterase deficiency  NO--Prior Anesthesia Complication  NO--Latex Allergy  NO--Loose teeth  NO--History of Rheumatoid Arthritis  YES--RYLAND  No Bleeding risk  NO--Other_____   Revised Cardiac Risk Index for Pre-Operative Risk from [employer]  on 8/8/2023  ** All calculations should be rechecked by clinician prior to use **    RESULT SUMMARY:  0 points  Class I Risk    3.9 %  30-day risk of death, MI, or cardiac arrest    From [place] 2017, based on pooled data from 5 high quality external validations (4 prospective). These numbers are higher than those often quoted from the now-outdated original study (Mike 1999). See Evidence for details.      INPUTS:  Elevated-risk surgery —> 0 = No  History of ischemic heart disease —> 0 = No  History of congestive heart failure —> 0 = No  History of cerebrovascular disease —> 0 = No  Pre-operative treatment with insulin —> 0 = No  Pre-operative creatinine >2 mg/dL / 176.8 µmol/L —> 0 = No  D[facility] Activity Status Index (DASI) from [employer]  on 8/8/2023  ** All calculations should be rechecked by clinician prior to use **    RESULT SUMMARY:  28.7 points  The higher the score (maximum 58.2), the higher the functional status.    6.27 METs        INPUTS:  Take care of self —> 2.75 = Yes  Walk indoors —> 1.75 = Yes  Walk 1&ndash;2 blocks on level ground —> 2.75 = Yes  Climb a flight of stairs or walk up a hill —> 5.5 = Yes  Run a short distance —> 0 = No  Do light work around the house —> 2.7 = Yes  Do moderate work around the house —> 3.5 = Yes  Do heavy work around the house —> 0 = No  Do yardwork —> 4.5 = Yes  Have sexual relations —> 5.25 = Yes  Participate in moderate recreational activities —> 0 = No  Participate in strenuous sports —> 0 = No

## 2023-08-09 DIAGNOSIS — Z01.818 ENCOUNTER FOR OTHER PREPROCEDURAL EXAMINATION: ICD-10-CM

## 2023-08-09 DIAGNOSIS — E66.01 MORBID (SEVERE) OBESITY DUE TO EXCESS CALORIES: ICD-10-CM

## 2023-08-18 NOTE — ED ADULT NURSE NOTE - NS_ED_NURSE_TEACHING_TOPIC_ED_A_ED
"Patient focused on discharge home with PICC line/ Ertapenem therapy. Educated on use and care of PICC line. States "I've had to do this before." Verbalized understanding of procedure.   "
Attempted to start iv x2, pt stated 'there are several nurses in er who can get my iv, can you ask one of them?', called er and requested iv stick after speaking to Mirella.'  
Received to room 406 from the ER, with c/o abdominal pain. A CT abdomen was done and showed inflammatory changes and a small abscess. Surgery was consulted and feels that medical management is only needed at this time. She is sitting in the bed, speaking on the phone, in no distress at this time.  
ENT/Other specify

## 2023-08-23 NOTE — ASU PATIENT PROFILE, ADULT - NSICDXPASTMEDICALHX_GEN_ALL_CORE_FT
PAST MEDICAL HISTORY:  Asthma     Encounter for surveillance of transdermal patch hormonal contraceptive device     GERD (gastroesophageal reflux disease)     Obesity BMI 50    RYLAND on CPAP NOLONGER SINCE GASTRIC SLEEVE

## 2023-08-23 NOTE — ASU PATIENT PROFILE, ADULT - FALL HARM RISK - UNIVERSAL INTERVENTIONS
Bed in lowest position, wheels locked, appropriate side rails in place/Call bell, personal items and telephone in reach/Instruct patient to call for assistance before getting out of bed or chair/Non-slip footwear when patient is out of bed/[place] to call system/Physically safe environment - no spills, clutter or unnecessary equipment/Purposeful Proactive Rounding/Room/bathroom lighting operational, light cord in reach

## 2023-08-24 ENCOUNTER — APPOINTMENT (OUTPATIENT)
Dept: SURGERY | Facility: HOSPITAL | Age: 33
End: 2023-08-24

## 2023-08-24 ENCOUNTER — RESULT REVIEW (OUTPATIENT)
Age: 33
End: 2023-08-24

## 2023-08-24 ENCOUNTER — TRANSCRIPTION ENCOUNTER (OUTPATIENT)
Age: 33
End: 2023-08-24

## 2023-08-24 ENCOUNTER — INPATIENT (INPATIENT)
Facility: HOSPITAL | Age: 33
LOS: 1 days | Discharge: HOME | DRG: 403 | End: 2023-08-26
Attending: STUDENT IN AN ORGANIZED HEALTH CARE EDUCATION/TRAINING PROGRAM | Admitting: STUDENT IN AN ORGANIZED HEALTH CARE EDUCATION/TRAINING PROGRAM
Payer: MEDICAID

## 2023-08-24 VITALS
OXYGEN SATURATION: 95 % | DIASTOLIC BLOOD PRESSURE: 69 MMHG | HEART RATE: 92 BPM | SYSTOLIC BLOOD PRESSURE: 131 MMHG | TEMPERATURE: 98 F | WEIGHT: 293 LBS | HEIGHT: 72 IN | RESPIRATION RATE: 18 BRPM

## 2023-08-24 DIAGNOSIS — Z98.890 OTHER SPECIFIED POSTPROCEDURAL STATES: Chronic | ICD-10-CM

## 2023-08-24 DIAGNOSIS — Z90.3 ACQUIRED ABSENCE OF STOMACH [PART OF]: Chronic | ICD-10-CM

## 2023-08-24 DIAGNOSIS — E66.01 MORBID (SEVERE) OBESITY DUE TO EXCESS CALORIES: ICD-10-CM

## 2023-08-24 LAB
BLD GP AB SCN SERPL QL: SIGNIFICANT CHANGE UP
GLUCOSE BLDC GLUCOMTR-MCNC: 232 MG/DL — HIGH (ref 70–99)

## 2023-08-24 PROCEDURE — 94640 AIRWAY INHALATION TREATMENT: CPT

## 2023-08-24 PROCEDURE — S2900: CPT

## 2023-08-24 PROCEDURE — 43644 LAP GASTRIC BYPASS/ROUX-EN-Y: CPT | Mod: 22

## 2023-08-24 PROCEDURE — 84100 ASSAY OF PHOSPHORUS: CPT

## 2023-08-24 PROCEDURE — 80048 BASIC METABOLIC PNL TOTAL CA: CPT

## 2023-08-24 PROCEDURE — C1889: CPT

## 2023-08-24 PROCEDURE — 88305 TISSUE EXAM BY PATHOLOGIST: CPT

## 2023-08-24 PROCEDURE — 83735 ASSAY OF MAGNESIUM: CPT

## 2023-08-24 PROCEDURE — C9399: CPT

## 2023-08-24 PROCEDURE — C9113: CPT

## 2023-08-24 PROCEDURE — 86901 BLOOD TYPING SEROLOGIC RH(D): CPT

## 2023-08-24 PROCEDURE — 88307 TISSUE EXAM BY PATHOLOGIST: CPT | Mod: 26

## 2023-08-24 PROCEDURE — 86850 RBC ANTIBODY SCREEN: CPT

## 2023-08-24 PROCEDURE — 88307 TISSUE EXAM BY PATHOLOGIST: CPT

## 2023-08-24 PROCEDURE — 85025 COMPLETE CBC W/AUTO DIFF WBC: CPT

## 2023-08-24 PROCEDURE — 82962 GLUCOSE BLOOD TEST: CPT

## 2023-08-24 PROCEDURE — 88305 TISSUE EXAM BY PATHOLOGIST: CPT | Mod: 26

## 2023-08-24 PROCEDURE — 36415 COLL VENOUS BLD VENIPUNCTURE: CPT

## 2023-08-24 PROCEDURE — S2900 ROBOTIC SURGICAL SYSTEM: CPT | Mod: NC

## 2023-08-24 PROCEDURE — 86900 BLOOD TYPING SEROLOGIC ABO: CPT

## 2023-08-24 RX ORDER — HYDROMORPHONE HYDROCHLORIDE 2 MG/ML
1 INJECTION INTRAMUSCULAR; INTRAVENOUS; SUBCUTANEOUS
Refills: 0 | Status: DISCONTINUED | OUTPATIENT
Start: 2023-08-24 | End: 2023-08-24

## 2023-08-24 RX ORDER — ENOXAPARIN SODIUM 100 MG/ML
40 INJECTION SUBCUTANEOUS EVERY 24 HOURS
Refills: 0 | Status: DISCONTINUED | OUTPATIENT
Start: 2023-08-25 | End: 2023-08-26

## 2023-08-24 RX ORDER — SODIUM CHLORIDE 9 MG/ML
1000 INJECTION, SOLUTION INTRAVENOUS
Refills: 0 | Status: DISCONTINUED | OUTPATIENT
Start: 2023-08-24 | End: 2023-08-24

## 2023-08-24 RX ORDER — ACETAMINOPHEN 500 MG
975 TABLET ORAL EVERY 8 HOURS
Refills: 0 | Status: COMPLETED | OUTPATIENT
Start: 2023-08-24 | End: 2024-07-22

## 2023-08-24 RX ORDER — HYDROMORPHONE HYDROCHLORIDE 2 MG/ML
0.5 INJECTION INTRAMUSCULAR; INTRAVENOUS; SUBCUTANEOUS
Refills: 0 | Status: DISCONTINUED | OUTPATIENT
Start: 2023-08-24 | End: 2023-08-24

## 2023-08-24 RX ORDER — MEPERIDINE HYDROCHLORIDE 50 MG/ML
12.5 INJECTION INTRAMUSCULAR; INTRAVENOUS; SUBCUTANEOUS
Refills: 0 | Status: DISCONTINUED | OUTPATIENT
Start: 2023-08-24 | End: 2023-08-24

## 2023-08-24 RX ORDER — ONDANSETRON 8 MG/1
4 TABLET, FILM COATED ORAL EVERY 8 HOURS
Refills: 0 | Status: DISCONTINUED | OUTPATIENT
Start: 2023-08-24 | End: 2023-08-24

## 2023-08-24 RX ORDER — SODIUM CHLORIDE 9 MG/ML
1000 INJECTION, SOLUTION INTRAVENOUS
Refills: 0 | Status: DISCONTINUED | OUTPATIENT
Start: 2023-08-24 | End: 2023-08-26

## 2023-08-24 RX ORDER — ALBUTEROL 90 UG/1
2 AEROSOL, METERED ORAL EVERY 6 HOURS
Refills: 0 | Status: DISCONTINUED | OUTPATIENT
Start: 2023-08-24 | End: 2023-08-26

## 2023-08-24 RX ORDER — PANTOPRAZOLE SODIUM 20 MG/1
40 TABLET, DELAYED RELEASE ORAL EVERY 24 HOURS
Refills: 0 | Status: DISCONTINUED | OUTPATIENT
Start: 2023-08-24 | End: 2023-08-25

## 2023-08-24 RX ORDER — APREPITANT 80 MG/1
40 CAPSULE ORAL ONCE
Refills: 0 | Status: COMPLETED | OUTPATIENT
Start: 2023-08-24 | End: 2023-08-24

## 2023-08-24 RX ORDER — ONDANSETRON 8 MG/1
4 TABLET, FILM COATED ORAL EVERY 6 HOURS
Refills: 0 | Status: DISCONTINUED | OUTPATIENT
Start: 2023-08-25 | End: 2023-08-26

## 2023-08-24 RX ORDER — ACETAMINOPHEN 500 MG
1000 TABLET ORAL ONCE
Refills: 0 | Status: COMPLETED | OUTPATIENT
Start: 2023-08-24 | End: 2023-08-24

## 2023-08-24 RX ORDER — ENOXAPARIN SODIUM 100 MG/ML
40 INJECTION SUBCUTANEOUS ONCE
Refills: 0 | Status: COMPLETED | OUTPATIENT
Start: 2023-08-24 | End: 2023-08-24

## 2023-08-24 RX ORDER — GABAPENTIN 400 MG/1
300 CAPSULE ORAL THREE TIMES A DAY
Refills: 0 | Status: DISCONTINUED | OUTPATIENT
Start: 2023-08-24 | End: 2023-08-26

## 2023-08-24 RX ADMIN — APREPITANT 40 MILLIGRAM(S): 80 CAPSULE ORAL at 12:24

## 2023-08-24 RX ADMIN — Medication 400 MILLIGRAM(S): at 20:07

## 2023-08-24 RX ADMIN — GABAPENTIN 300 MILLIGRAM(S): 400 CAPSULE ORAL at 22:14

## 2023-08-24 RX ADMIN — ENOXAPARIN SODIUM 40 MILLIGRAM(S): 100 INJECTION SUBCUTANEOUS at 12:24

## 2023-08-24 RX ADMIN — SODIUM CHLORIDE 100 MILLILITER(S): 9 INJECTION, SOLUTION INTRAVENOUS at 19:52

## 2023-08-24 NOTE — CHART NOTE - NSCHARTNOTEFT_GEN_A_CORE
PACU ANESTHESIA ADMISSION NOTE      Procedure: Creation, gastric bypass, laparoscopic    Upper endoscopy    Block, transversus abdominis plane, bilateral      Post op diagnosis:  Severe obesity (BMI >= 40)        ____  Intubated  TV:______       Rate: ______      FiO2: ______    __x__  Patent Airway    ____  Full return of protective reflexes    ____  Full recovery from anesthesia / back to baseline     Vitals:   T:     98      R: 12                 BP:    139/85               Sat:   98%                P:  102      Mental Status:  __x__ Awake   _____ Alert   _____ Drowsy   _____ Sedated    Nausea/Vomiting:  __x__ NO  ______Yes,   See Post - Op Orders          Pain Scale (0-10):  _____    Treatment: ____ None    ___x_ See Post - Op/PCA Orders    Post - Operative Fluids:   ____ Oral   ___x_ See Post - Op Orders    Plan: Discharge:   ____Home       ___x__Floor     _____Critical Care    _____  Other:_________________    Comments: Uneventful intraoperative course. No anesthesia issues or complications noted.  Patient stable upon arrival to PACU. Report given to RN. Discharge when criteria met.

## 2023-08-24 NOTE — PATIENT PROFILE ADULT - FALL HARM RISK - RISK INTERVENTIONS
Assistance OOB with selected safe patient handling equipment/Assistance with ambulation/Communicate Fall Risk and Risk Factors to all staff, patient, and family/Monitor gait and stability/Reinforce activity limits and safety measures with patient and family/Sit up slowly, dangle for a short time, stand at bedside before walking/Use of alarms - bed, chair and/or voice tab/Visual Cue: Yellow wristband/Bed in lowest position, wheels locked, appropriate side rails in place/Call bell, personal items and telephone in reach/Instruct patient to call for assistance before getting out of bed or chair/Non-slip footwear when patient is out of bed/[place] to call system/Physically safe environment - no spills, clutter or unnecessary equipment/Purposeful Proactive Rounding/Room/bathroom lighting operational, light cord in reach

## 2023-08-24 NOTE — BRIEF OPERATIVE NOTE - NSICDXBRIEFPROCEDURE_GEN_ALL_CORE_FT
PROCEDURES:  Creation, gastric bypass, laparoscopic 24-Aug-2023 19:48:09  Jenni Palencia  Upper endoscopy 24-Aug-2023 19:48:16  Jenni Palencia  Block, transversus abdominis plane, bilateral 24-Aug-2023 19:48:27  Jenni Palencia   PROCEDURES:  Upper endoscopy 24-Aug-2023 19:48:16  Jenni Palencia  Block, transversus abdominis plane, bilateral 24-Aug-2023 19:48:27  Jenni Palencia  Robot-assisted laparoscopic conversion of previous sleeve gastrectomy to Claudette-en-Y gastric bypass using da Juan A Xi 25-Aug-2023 09:37:36  Yani Boucher

## 2023-08-25 LAB
ANION GAP SERPL CALC-SCNC: 11 MMOL/L — SIGNIFICANT CHANGE UP (ref 7–14)
BASOPHILS # BLD AUTO: 0.01 K/UL — SIGNIFICANT CHANGE UP (ref 0–0.2)
BASOPHILS NFR BLD AUTO: 0.1 % — SIGNIFICANT CHANGE UP (ref 0–1)
BUN SERPL-MCNC: 7 MG/DL — LOW (ref 10–20)
CALCIUM SERPL-MCNC: 8.5 MG/DL — SIGNIFICANT CHANGE UP (ref 8.4–10.4)
CHLORIDE SERPL-SCNC: 104 MMOL/L — SIGNIFICANT CHANGE UP (ref 98–110)
CO2 SERPL-SCNC: 22 MMOL/L — SIGNIFICANT CHANGE UP (ref 17–32)
CREAT SERPL-MCNC: 0.7 MG/DL — SIGNIFICANT CHANGE UP (ref 0.7–1.5)
EGFR: 118 ML/MIN/1.73M2 — SIGNIFICANT CHANGE UP
EOSINOPHIL # BLD AUTO: 0.01 K/UL — SIGNIFICANT CHANGE UP (ref 0–0.7)
EOSINOPHIL NFR BLD AUTO: 0.1 % — SIGNIFICANT CHANGE UP (ref 0–8)
GLUCOSE BLDC GLUCOMTR-MCNC: 119 MG/DL — HIGH (ref 70–99)
GLUCOSE BLDC GLUCOMTR-MCNC: 120 MG/DL — HIGH (ref 70–99)
GLUCOSE BLDC GLUCOMTR-MCNC: 154 MG/DL — HIGH (ref 70–99)
GLUCOSE BLDC GLUCOMTR-MCNC: 201 MG/DL — HIGH (ref 70–99)
GLUCOSE SERPL-MCNC: 217 MG/DL — HIGH (ref 70–99)
HCT VFR BLD CALC: 30.7 % — LOW (ref 37–47)
HGB BLD-MCNC: 9.6 G/DL — LOW (ref 12–16)
IMM GRANULOCYTES NFR BLD AUTO: 0.3 % — SIGNIFICANT CHANGE UP (ref 0.1–0.3)
LYMPHOCYTES # BLD AUTO: 0.7 K/UL — LOW (ref 1.2–3.4)
LYMPHOCYTES # BLD AUTO: 6.8 % — LOW (ref 20.5–51.1)
MAGNESIUM SERPL-MCNC: 1.7 MG/DL — LOW (ref 1.8–2.4)
MCHC RBC-ENTMCNC: 23.9 PG — LOW (ref 27–31)
MCHC RBC-ENTMCNC: 31.3 G/DL — LOW (ref 32–37)
MCV RBC AUTO: 76.6 FL — LOW (ref 81–99)
MONOCYTES # BLD AUTO: 0.31 K/UL — SIGNIFICANT CHANGE UP (ref 0.1–0.6)
MONOCYTES NFR BLD AUTO: 3 % — SIGNIFICANT CHANGE UP (ref 1.7–9.3)
NEUTROPHILS # BLD AUTO: 9.21 K/UL — HIGH (ref 1.4–6.5)
NEUTROPHILS NFR BLD AUTO: 89.7 % — HIGH (ref 42.2–75.2)
NRBC # BLD: 0 /100 WBCS — SIGNIFICANT CHANGE UP (ref 0–0)
PHOSPHATE SERPL-MCNC: 3.1 MG/DL — SIGNIFICANT CHANGE UP (ref 2.1–4.9)
PLATELET # BLD AUTO: 196 K/UL — SIGNIFICANT CHANGE UP (ref 130–400)
PMV BLD: 10.2 FL — SIGNIFICANT CHANGE UP (ref 7.4–10.4)
POTASSIUM SERPL-MCNC: 4.6 MMOL/L — SIGNIFICANT CHANGE UP (ref 3.5–5)
POTASSIUM SERPL-SCNC: 4.6 MMOL/L — SIGNIFICANT CHANGE UP (ref 3.5–5)
RBC # BLD: 4.01 M/UL — LOW (ref 4.2–5.4)
RBC # FLD: 16 % — HIGH (ref 11.5–14.5)
SODIUM SERPL-SCNC: 137 MMOL/L — SIGNIFICANT CHANGE UP (ref 135–146)
WBC # BLD: 10.27 K/UL — SIGNIFICANT CHANGE UP (ref 4.8–10.8)
WBC # FLD AUTO: 10.27 K/UL — SIGNIFICANT CHANGE UP (ref 4.8–10.8)

## 2023-08-25 RX ORDER — PANTOPRAZOLE SODIUM 20 MG/1
40 TABLET, DELAYED RELEASE ORAL
Refills: 0 | Status: DISCONTINUED | OUTPATIENT
Start: 2023-08-25 | End: 2023-08-26

## 2023-08-25 RX ORDER — SODIUM CHLORIDE 9 MG/ML
1000 INJECTION, SOLUTION INTRAVENOUS
Refills: 0 | Status: DISCONTINUED | OUTPATIENT
Start: 2023-08-25 | End: 2023-08-26

## 2023-08-25 RX ORDER — DEXTROSE 50 % IN WATER 50 %
12.5 SYRINGE (ML) INTRAVENOUS ONCE
Refills: 0 | Status: DISCONTINUED | OUTPATIENT
Start: 2023-08-25 | End: 2023-08-26

## 2023-08-25 RX ORDER — GLUCAGON INJECTION, SOLUTION 0.5 MG/.1ML
1 INJECTION, SOLUTION SUBCUTANEOUS ONCE
Refills: 0 | Status: DISCONTINUED | OUTPATIENT
Start: 2023-08-25 | End: 2023-08-26

## 2023-08-25 RX ORDER — MAGNESIUM SULFATE 500 MG/ML
2 VIAL (ML) INJECTION ONCE
Refills: 0 | Status: COMPLETED | OUTPATIENT
Start: 2023-08-25 | End: 2023-08-25

## 2023-08-25 RX ORDER — DEXTROSE 50 % IN WATER 50 %
15 SYRINGE (ML) INTRAVENOUS ONCE
Refills: 0 | Status: DISCONTINUED | OUTPATIENT
Start: 2023-08-25 | End: 2023-08-26

## 2023-08-25 RX ORDER — DEXTROSE 50 % IN WATER 50 %
25 SYRINGE (ML) INTRAVENOUS ONCE
Refills: 0 | Status: DISCONTINUED | OUTPATIENT
Start: 2023-08-25 | End: 2023-08-26

## 2023-08-25 RX ORDER — INSULIN LISPRO 100/ML
VIAL (ML) SUBCUTANEOUS
Refills: 0 | Status: DISCONTINUED | OUTPATIENT
Start: 2023-08-25 | End: 2023-08-26

## 2023-08-25 RX ORDER — INSULIN LISPRO 100/ML
VIAL (ML) SUBCUTANEOUS
Refills: 0 | Status: DISCONTINUED | OUTPATIENT
Start: 2023-08-25 | End: 2023-08-25

## 2023-08-25 RX ORDER — ACETAMINOPHEN 500 MG
975 TABLET ORAL EVERY 8 HOURS
Refills: 0 | Status: DISCONTINUED | OUTPATIENT
Start: 2023-08-25 | End: 2023-08-26

## 2023-08-25 RX ADMIN — Medication 975 MILLIGRAM(S): at 17:19

## 2023-08-25 RX ADMIN — ONDANSETRON 4 MILLIGRAM(S): 8 TABLET, FILM COATED ORAL at 17:22

## 2023-08-25 RX ADMIN — Medication 25 GRAM(S): at 08:44

## 2023-08-25 RX ADMIN — ONDANSETRON 4 MILLIGRAM(S): 8 TABLET, FILM COATED ORAL at 01:07

## 2023-08-25 RX ADMIN — Medication 975 MILLIGRAM(S): at 21:11

## 2023-08-25 RX ADMIN — GABAPENTIN 300 MILLIGRAM(S): 400 CAPSULE ORAL at 23:33

## 2023-08-25 RX ADMIN — SODIUM CHLORIDE 150 MILLILITER(S): 9 INJECTION, SOLUTION INTRAVENOUS at 18:24

## 2023-08-25 RX ADMIN — ONDANSETRON 4 MILLIGRAM(S): 8 TABLET, FILM COATED ORAL at 05:33

## 2023-08-25 RX ADMIN — Medication 975 MILLIGRAM(S): at 14:40

## 2023-08-25 RX ADMIN — ALBUTEROL 2 PUFF(S): 90 AEROSOL, METERED ORAL at 14:26

## 2023-08-25 RX ADMIN — ENOXAPARIN SODIUM 40 MILLIGRAM(S): 100 INJECTION SUBCUTANEOUS at 12:14

## 2023-08-25 RX ADMIN — PANTOPRAZOLE SODIUM 40 MILLIGRAM(S): 20 TABLET, DELAYED RELEASE ORAL at 05:34

## 2023-08-25 RX ADMIN — GABAPENTIN 300 MILLIGRAM(S): 400 CAPSULE ORAL at 14:40

## 2023-08-25 RX ADMIN — Medication 975 MILLIGRAM(S): at 01:29

## 2023-08-25 RX ADMIN — SODIUM CHLORIDE 150 MILLILITER(S): 9 INJECTION, SOLUTION INTRAVENOUS at 05:33

## 2023-08-25 RX ADMIN — GABAPENTIN 300 MILLIGRAM(S): 400 CAPSULE ORAL at 08:45

## 2023-08-25 RX ADMIN — ONDANSETRON 4 MILLIGRAM(S): 8 TABLET, FILM COATED ORAL at 12:14

## 2023-08-25 RX ADMIN — Medication 1: at 12:13

## 2023-08-25 RX ADMIN — ONDANSETRON 4 MILLIGRAM(S): 8 TABLET, FILM COATED ORAL at 23:34

## 2023-08-25 NOTE — PROGRESS NOTE ADULT - SUBJECTIVE AND OBJECTIVE BOX
GENERAL SURGERY PROGRESS NOTE     BRIAN JOHNSTON  33y  Female  Hospital day :1d    Procedure: Creation, gastric bypass, laparoscopic    Upper endoscopy    Block, transversus abdominis plane, bilateral      OVERNIGHT EVENTS: No acute events overnight or post op. Patient remains HD stable, afebrile and non tachy. Patient complained of nausea and pain overnight - improved w/ administration of zofran and tylenol. Patients abdomen is soft and non distended. Slight epigastric tenderness noted. Otherwise, incisions c/d/i. Patient tolerating sharad clears - has finished 3 thus far.     T(F): 97.3 (08-25-23 @ 00:35), Max: 98.8 (08-24-23 @ 19:41)  HR: 82 (08-25-23 @ 00:35) (82 - 100)  BP: 130/78 (08-25-23 @ 00:35) (130/78 - 142/71)  ABP: --  ABP(mean): --  RR: 18 (08-25-23 @ 00:35) (17 - 19)  SpO2: 98% (08-25-23 @ 00:35) (95% - 100%)    DIET/FLUIDS: lactated ringers 1000 milliLiter(s) IV Continuous <Continuous>       GI proph:  pantoprazole  Injectable 40 milliGRAM(s) IV Push every 24 hours    AC/ proph: enoxaparin Injectable 40 milliGRAM(s) SubCutaneous every 24 hours    ABx:     PHYSICAL EXAM:  GENERAL: NAD, well-appearing  ABDOMEN: Soft, Nontender, Nondistended;   EXTREMITIES:  No clubbing, cyanosis, or edema      LABS  Labs:  CAPILLARY BLOOD GLUCOSE      POCT Blood Glucose.: 232 mg/dL (24 Aug 2023 23:49)             RADIOLOGY & ADDITIONAL TESTS:      A/P      Patient is a 33F w/ PMH BMI 45, ASTHMA, RYLAND W/O CPAP s/p ROBOTIC SLEEVE TO BYPASS, GJ REVISION, EGD and TAP BLOCK    PLAN:    - Monitor vitals  - Monitor bowel function  - Labs in AM  - Sharad clears diet  - LVX in AM  - No toradol  - Pain control w/ tylenol  - No toradol     GENERAL SURGERY PROGRESS NOTE     BRIAN JOHNSTON  33y  Female  Hospital day :1d    Procedure: Robotic assisted laparoscopic gastric bypass    Upper endoscopy    Block, transversus abdominis plane, bilateral      OVERNIGHT EVENTS: No acute events overnight or post op. Patient remains HD stable, afebrile and non tachy. Patient complained of nausea and pain overnight - improved w/ administration of zofran and tylenol. Patient tolerating sigrid clears - has finished 3 thus far.     T(F): 97.3 (08-25-23 @ 00:35), Max: 98.8 (08-24-23 @ 19:41)  HR: 82 (08-25-23 @ 00:35) (82 - 100)  BP: 130/78 (08-25-23 @ 00:35) (130/78 - 142/71)  ABP: --  ABP(mean): --  RR: 18 (08-25-23 @ 00:35) (17 - 19)  SpO2: 98% (08-25-23 @ 00:35) (95% - 100%)    DIET/FLUIDS: lactated ringers 1000 milliLiter(s) IV Continuous <Continuous>       GI proph:  pantoprazole  Injectable 40 milliGRAM(s) IV Push every 24 hours    AC/ proph: enoxaparin Injectable 40 milliGRAM(s) SubCutaneous every 24 hours      PHYSICAL EXAM:  GENERAL: NAD, well-appearing  ABDOMEN: Soft, Nontender, Nondistended;   EXTREMITIES:  No clubbing, cyanosis, or edema        CONSTITUTIONAL: Well groomed, no apparent distress  EYES: EOMI, No conjunctival or scleral injection, non-icteric  ENMT: Oral mucosa with moist membranes  NECK: Supple, symmetric and without tracheal deviation   RESP: No respiratory distress, no use of accessory muscles  CV: RRR,  no peripheral edema  GI: Soft, appropriately tender, mildly distended, no rebound, no guarding; no palpable masses; no hernia palpated. Incisions well approximated with skin glue. No surrounding erythema, edema or drainage  NEURO: No focal deficits  PSYCH: A+O x 3, mood and affect appropriate        LABS  Labs:  CAPILLARY BLOOD GLUCOSE      POCT Blood Glucose.: 232 mg/dL (24 Aug 2023 23:49)             RADIOLOGY & ADDITIONAL TESTS:      A/P      Patient is a 33F w/ PMH BMI 45, ASTHMA, RYLAND W/O CPAP s/p ROBOTIC SLEEVE TO BYPASS, GJ REVISION, EGD and TAP BLOCK    PLAN:    - Monitor vitals  - Monitor bowel function  - Labs in AM  - Continue sigrid clears diet  - LVX in AM, SCDs  - Avoid NSAIDs  - Multimodal nonnarcotic pain control  - OOB to halls  - IS q1hr  - Will add sliding scale insulin for hyperglycemia    Discussed with attending physician who agreed with the above unless otherwise noted.

## 2023-08-25 NOTE — PROGRESS NOTE ADULT - ATTENDING COMMENTS
Doing well this AM, bandar 3oz/hr. Nausea improved overnight- none now. Walking and voiding. Pain in left arm improved (c/o after surgery possibly due to BP cuff). Abdominal pain is as expected.   Nontachy.   H/H stable from pre-op labs  Abd soft and approp min ttp, incisions c/d/i  POD1 robo conversion of sleeve to bypass  Doing well- pt states  cannot pick her up until tomorrow. will continue sigrid clears and PRN nausea/pain meds. Anticipate dc home tomorrow.

## 2023-08-26 ENCOUNTER — TRANSCRIPTION ENCOUNTER (OUTPATIENT)
Age: 33
End: 2023-08-26

## 2023-08-26 VITALS
TEMPERATURE: 98 F | HEART RATE: 71 BPM | OXYGEN SATURATION: 99 % | RESPIRATION RATE: 18 BRPM | SYSTOLIC BLOOD PRESSURE: 122 MMHG | DIASTOLIC BLOOD PRESSURE: 70 MMHG

## 2023-08-26 LAB — GLUCOSE BLDC GLUCOMTR-MCNC: 124 MG/DL — HIGH (ref 70–99)

## 2023-08-26 RX ORDER — ACETAMINOPHEN 500 MG
3 TABLET ORAL
Qty: 0 | Refills: 0 | DISCHARGE
Start: 2023-08-26

## 2023-08-26 RX ORDER — PANTOPRAZOLE SODIUM 20 MG/1
1 TABLET, DELAYED RELEASE ORAL
Qty: 0 | Refills: 0 | DISCHARGE
Start: 2023-08-26

## 2023-08-26 RX ORDER — ENOXAPARIN SODIUM 100 MG/ML
40 INJECTION SUBCUTANEOUS
Qty: 0 | Refills: 0 | DISCHARGE
Start: 2023-08-26

## 2023-08-26 RX ADMIN — Medication 975 MILLIGRAM(S): at 05:22

## 2023-08-26 RX ADMIN — ONDANSETRON 4 MILLIGRAM(S): 8 TABLET, FILM COATED ORAL at 05:26

## 2023-08-26 RX ADMIN — PANTOPRAZOLE SODIUM 40 MILLIGRAM(S): 20 TABLET, DELAYED RELEASE ORAL at 05:24

## 2023-08-26 RX ADMIN — GABAPENTIN 300 MILLIGRAM(S): 400 CAPSULE ORAL at 08:26

## 2023-08-26 RX ADMIN — ENOXAPARIN SODIUM 40 MILLIGRAM(S): 100 INJECTION SUBCUTANEOUS at 08:46

## 2023-08-26 NOTE — DISCHARGE NOTE NURSING/CASE MANAGEMENT/SOCIAL WORK - NSDCPEFALRISK_GEN_ALL_CORE
For information on Fall & Injury Prevention, visit: [email or web address] OR  [email or web address] OR  https://www.cdc.gov/steadi/patient.html

## 2023-08-26 NOTE — DISCHARGE NOTE PROVIDER - HOSPITAL COURSE
This is a 33YOF with PMH of RYLAND, HTN, asthma and class 3 obesity s/p gastric sleeve with 70lb weight loss who presented to [facility] on 8/24 for revision to gastric bypass fro additional weight loss. She underwent a robotic assisted laparoscopic revision of gastric sleeve to kahlil-en-y gastric bypass, upper endoscopy, bilateral TAP block on 8/24. She tolerated the procedure well and was transferred to the PACU and subsequently the floor in stable condition. She was started on bariatric stage 1 diet which she tolerated well. She was also ambulatory to the [place], using IS and passing flatus. Her pain and nausea were well controlled. She was deemed stable for discharge to home  on 8/26 with 2 week follow up in the office.

## 2023-08-26 NOTE — DISCHARGE NOTE PROVIDER - NSDCCPCAREPLAN_GEN_ALL_CORE_FT
PRINCIPAL DISCHARGE DIAGNOSIS  Diagnosis: Class 3 severe obesity with serious comorbidity in adult  Assessment and Plan of Treatment:

## 2023-08-26 NOTE — DISCHARGE NOTE NURSING/CASE MANAGEMENT/SOCIAL WORK - PATIENT PORTAL LINK FT
You can access the FollowMyHealth Patient Portal offered by [facility] by registering at the following website: [email or web address]. By joining [facility]’s FollowMyHealth portal, you will also be able to view your health information using other applications (apps) compatible with our system.

## 2023-08-26 NOTE — DISCHARGE NOTE PROVIDER - NSDCFUADDINST_GEN_ALL_CORE_FT
Take tylenol 975mg every 8 hours as needed for pain.     No lifting greater than 10lbs for 4 weeks. You may shower normally, do no scrub incisions. No swimming, soaking or bathing for 2 weeks.

## 2023-08-26 NOTE — DISCHARGE NOTE PROVIDER - NSDCMRMEDTOKEN_GEN_ALL_CORE_FT
acetaminophen 325 mg oral tablet: 3 tab(s) orally every 8 hours  Albuterol (Eqv-ProAir HFA) 90 mcg/inh inhalation aerosol: 2 puff(s) inhaled every 6 hours, As Needed  enoxaparin: 40 milligram(s) subcutaneous once a day  Multiple Vitamins oral capsule: 1 cap(s) orally once a day  pantoprazole 40 mg oral delayed release tablet: 1 tab(s) orally once a day (before a meal)

## 2023-08-26 NOTE — PROGRESS NOTE ADULT - SUBJECTIVE AND OBJECTIVE BOX
GENERAL SURGERY PROGRESS NOTE     BRIAN JOHNSTON  [address] [place] [address] day :3d    POD:2d  Procedure: Creation, gastric bypass, laparoscopic    Upper endoscopy    Block, transversus abdominis plane, bilateral    Robot-assisted laparoscopic conversion of previous sleeve gastrectomy to Claudette-en-Y gastric bypass using da Juan A Xi      Surgical Attending: Yani Boucher  Overnight events: No acute events overnight. She is tolerating her CLD however having some nausea, no vomiting,  passing gas and having BM. She has been voiding appropriately and ambulating on the floor. She only reports pain at a single incision site in RLQ.     T(F): 99 (08-26-23 @ 00:04), Max: 99 (08-26-23 @ 00:04)  HR: 76 (08-26-23 @ 00:04) (67 - 77)  BP: 108/56 (08-26-23 @ 00:04) (108/56 - 136/72)  ABP: --  ABP(mean): --  RR: 18 (08-26-23 @ 00:04) (18 - 18)  SpO2: 100% (08-26-23 @ 00:04) (97% - 100%)    IN'S / OUT's:    08-24-23 @ 07:01  -  08-25-23 @ 07:00  --------------------------------------------------------  IN:    Lactated Ringers: 200 mL  Total IN: 200 mL    OUT:    Voided (mL): 1000 mL  Total OUT: 1000 mL    Total NET: -800 mL      08-25-23 @ 07:01  -  08-26-23 @ 03:51  --------------------------------------------------------  IN:    Lactated Ringers w/ Additives: 1800 mL  Total IN: 1800 mL    OUT:    Voided (mL): 1000 mL  Total OUT: 1000 mL    Total NET: 800 mL          PHYSICAL EXAM:  GENERAL: NAD, well-appearing  CHEST/LUNG: Clear to auscultation bilaterally  HEART: Regular rate and rhythm  ABDOMEN: Soft, Nontender, Nondistended; port site incisions closed with Dermabond. No evidence of bleeding or drainage.   EXTREMITIES:  No clubbing, cyanosis, or edema      LABS  Labs:  CAPILLARY BLOOD GLUCOSE      POCT Blood Glucose.: 119 mg/dL (25 Aug 2023 20:56)  POCT Blood Glucose.: 120 mg/dL (25 Aug 2023 17:12)  POCT Blood Glucose.: 154 mg/dL (25 Aug 2023 12:08)  POCT Blood Glucose.: 201 mg/dL (25 Aug 2023 04:17)                          9.6    10.27 )-----------( 196      ( 25 Aug 2023 04:40 )             30.7       Auto Neutrophil %: 89.7 % (08-25-23 @ 04:40)  Auto Immature Granulocyte %: 0.3 % (08-25-23 @ 04:40)    08-25    137  |  104  |  7<L>  ----------------------------<  217<H>  4.6   |  22  |  0.7      Calcium: 8.5 mg/dL (08-25-23 @ 04:40)      LFTs:         Coags:            Urinalysis Basic - ( 25 Aug 2023 04:40 )    Color: x / Appearance: x / SG: x / pH: x  Gluc: 217 mg/dL / Ketone: x  / Bili: x / Urobili: x   Blood: x / Protein: x / Nitrite: x   Leuk Esterase: x / RBC: x / WBC x   Sq Epi: x / Non Sq Epi: x / Bacteria: x            RADIOLOGY & ADDITIONAL TESTS:      A/P:  BRIAN JOHNSTON is a 33F w/ PMH BMI 45, ASTHMA, RYLAND W/O CPAP s/p ROBOTIC SLEEVE TO BYPASS, GJ REVISION, EGD and TAP BLOCK      PLAN:   - continue bariatric CLD  - DVT and GI ppx  - encourage ambulation and IS as tolerated  - multimodal pain control, avoid NSAIDs  - plan for d/c on 8/26 am      #DVT ppx: enoxaparin Injectable 40 milliGRAM(s) SubCutaneous every 24 hours    #GI ppx: pantoprazole    Tablet 40 milliGRAM(s) Oral before breakfast    Disposition:  Home    Above plan discussed with Attending Surgeon Dr. Wade , patient, patient family, and Primary team    [employer] [id number]       GENERAL SURGERY PROGRESS NOTE     BRIAN JOHNSTON  [address] [place] [address] day :3d    POD:2d  Procedure: Creation, gastric bypass, laparoscopic    Upper endoscopy    Block, transversus abdominis plane, bilateral    Robot-assisted laparoscopic conversion of previous sleeve gastrectomy to Claudette-en-Y gastric bypass using da Juan A gauzz      Surgical Attending: aYni Boucher  Overnight events: No acute events overnight. She is tolerating her CLD however having some nausea, no vomiting,  passing gas and having BM. She has been voiding appropriately and ambulating on the floor. She only reports pain at a single incision site in RLQ which is well controlled with current regimen. Using IS as instructed.    T(F): 99 (08-26-23 @ 00:04), Max: 99 (08-26-23 @ 00:04)  HR: 76 (08-26-23 @ 00:04) (67 - 77)  BP: 108/56 (08-26-23 @ 00:04) (108/56 - 136/72)  ABP: --  ABP(mean): --  RR: 18 (08-26-23 @ 00:04) (18 - 18)  SpO2: 100% (08-26-23 @ 00:04) (97% - 100%)    IN'S / OUT's:    08-24-23 @ 07:01  -  08-25-23 @ 07:00  --------------------------------------------------------  IN:    Lactated Ringers: 200 mL  Total IN: 200 mL    OUT:    Voided (mL): 1000 mL  Total OUT: 1000 mL    Total NET: -800 mL      08-25-23 @ 07:01  -  08-26-23 @ 03:51  --------------------------------------------------------  IN:    Lactated Ringers w/ Additives: 1800 mL  Total IN: 1800 mL    OUT:    Voided (mL): 1000 mL  Total OUT: 1000 mL    Total NET: 800 mL          PHYSICAL EXAM:  GENERAL: NAD, well-appearing  HEENT: EOMI, nonicteric  NECK: trachea midline,  no JVD  CHEST/LUNG: Equal chest rise b/l, no resp distress, no stridor or use of accessory muscles  HEART: Regular rate and rhythm, no peripheral edema  ABDOMEN: Soft, minimally tender, Nondistended; port site incisions closed with Dermabond. No evidence of bleeding or drainage.   NEURO: A&Ox3, no focal deficits    LABS  Labs:  CAPILLARY BLOOD GLUCOSE      POCT Blood Glucose.: 119 mg/dL (25 Aug 2023 20:56)  POCT Blood Glucose.: 120 mg/dL (25 Aug 2023 17:12)  POCT Blood Glucose.: 154 mg/dL (25 Aug 2023 12:08)  POCT Blood Glucose.: 201 mg/dL (25 Aug 2023 04:17)                          9.6    10.27 )-----------( 196      ( 25 Aug 2023 04:40 )             30.7       Auto Neutrophil %: 89.7 % (08-25-23 @ 04:40)  Auto Immature Granulocyte %: 0.3 % (08-25-23 @ 04:40)    08-25    137  |  104  |  7<L>  ----------------------------<  217<H>  4.6   |  22  |  0.7      Calcium: 8.5 mg/dL (08-25-23 @ 04:40)      LFTs:         Coags:            Urinalysis Basic - ( 25 Aug 2023 04:40 )    Color: x / Appearance: x / SG: x / pH: x  Gluc: 217 mg/dL / Ketone: x  / Bili: x / Urobili: x   Blood: x / Protein: x / Nitrite: x   Leuk Esterase: x / RBC: x / WBC x   Sq Epi: x / Non Sq Epi: x / Bacteria: x            RADIOLOGY & ADDITIONAL TESTS:      A/P:  BRIAN JOHNSTON is a 33F w/ PMH BMI 45, ASTHMA, RYLAND W/O CPAP s/p ROBOTIC SLEEVE TO BYPASS, GJ REVISION, EGD and TAP BLOCK      PLAN:   - continue bariatric stage 1 diet  - DVT ppx with SCDs and lovenox  - GI ppx with protonix  - encourage ambulation and IS as tolerated  - multimodal pain control, avoid NSAIDs  - Will DC 8/26      #DVT ppx: enoxaparin Injectable 40 milliGRAM(s) SubCutaneous every 24 hours    #GI ppx: pantoprazole    Tablet 40 milliGRAM(s) Oral before breakfast    Disposition:  Home    Above plan discussed with Attending Surgeon Dr. Wade , patient, patient family, and Primary team    [employer] [id number]

## 2023-08-26 NOTE — DISCHARGE NOTE PROVIDER - CARE PROVIDER_API CALL
Yani Boucher  Surgery  [address] [place] [address] [address] [place] [address]  Phone: [phone]  Fax: [phone]  Established Patient  Follow Up Time: 2 weeks

## 2023-08-28 LAB — SURGICAL PATHOLOGY STUDY: SIGNIFICANT CHANGE UP

## 2023-08-29 DIAGNOSIS — E88.81 METABOLIC SYNDROME: ICD-10-CM

## 2023-08-31 PROBLEM — J45.909 UNSPECIFIED ASTHMA, UNCOMPLICATED: Chronic | Status: ACTIVE | Noted: 2023-08-08

## 2023-09-01 ENCOUNTER — APPOINTMENT (OUTPATIENT)
Dept: SURGERY | Facility: CLINIC | Age: 33
End: 2023-09-01
Payer: MEDICAID

## 2023-09-01 VITALS
BODY MASS INDEX: 42.9 KG/M2 | SYSTOLIC BLOOD PRESSURE: 116 MMHG | WEIGHT: 293 LBS | OXYGEN SATURATION: 99 % | HEIGHT: 69.29 IN | TEMPERATURE: 96.5 F | HEART RATE: 105 BPM | DIASTOLIC BLOOD PRESSURE: 74 MMHG

## 2023-09-01 DIAGNOSIS — E66.01 MORBID (SEVERE) OBESITY DUE TO EXCESS CALORIES: ICD-10-CM

## 2023-09-01 PROCEDURE — 99024 POSTOP FOLLOW-UP VISIT: CPT

## 2023-09-01 RX ORDER — DOXYCYCLINE HYCLATE 100 MG/1
100 TABLET, DELAYED RELEASE ORAL TWICE DAILY
Qty: 14 | Refills: 0 | Status: ACTIVE | COMMUNITY
Start: 2023-09-01 | End: 1900-01-01

## 2023-09-05 PROBLEM — E66.01 MORBID OBESITY: Status: ACTIVE | Noted: 2020-11-20

## 2023-09-05 NOTE — HISTORY OF PRESENT ILLNESS
[de-identified] : Ms. Castro is a 33-year-old female who previously underwent a sleeve gastrectomy 2 years ago and was able to lose a significant amount of weight.  She was doing very well and then underwent a devastating loss in her family which caused her to go back to words more emotional eating and she gained most of her weight back.  She has been trying countless diets and exercise plans to get back to her weight she was able to obtain with her sleeve gastrectomy but has struggled to do so in a sustainable fashion.  She is now status post the conversion of her sleeve gastrectomy to Claudette-en-Y gastric bypass, robotic, 8/24.  She is tolerating liquids but the protein shakes have been a little bit thick for her to handle.  She has not had any vomiting.  She has no abdominal pain except near the larger incision site.  Near that larger incision site she has some cellulitis there.  There is no palpable fluid collection or drainage from the incision. She is walking and taking her Lovenox shots. She is taking her vitamins and getting in enough liquids but has not tried anything thicker than a protein shake yet.

## 2023-09-05 NOTE — ADDENDUM
[FreeTextEntry1] : Patient seen 9/4 in the office following this appointment for some drainage in that larger incision.  The wound was opened up and a small amount of pus was expressed.  This was irrigated and packed and she will continue her antibiotics.  Of note the cellulitis was completely gone around the wound.

## 2023-09-05 NOTE — PLAN
[FreeTextEntry1] : Antibiotics prescribed for cellulitis around larger incision Return to office next week to reassess cellulitis See Josh today, advance diet per protocol Continue adequate liquid intake Continue vitamins Increase exercise as able

## 2023-09-05 NOTE — ASSESSMENT
[FreeTextEntry1] : 33-year-old female who previously underwent a sleeve gastrectomy with regain of significant amount of weight status post Claudette-en-Y gastric bypass 8/24

## 2023-09-05 NOTE — PHYSICAL EXAM
[Normal] : affect appropriate [de-identified] : No distress [de-identified] : Nonlabored breathing [de-identified] : S1, S2 [de-identified] : Larger incision with small amount of surrounding erythema, no drainage [de-identified] : Normal gait

## 2023-09-06 DIAGNOSIS — G47.33 OBSTRUCTIVE SLEEP APNEA (ADULT) (PEDIATRIC): ICD-10-CM

## 2023-09-06 DIAGNOSIS — E66.01 MORBID (SEVERE) OBESITY DUE TO EXCESS CALORIES: ICD-10-CM

## 2023-09-06 DIAGNOSIS — R73.9 HYPERGLYCEMIA, UNSPECIFIED: ICD-10-CM

## 2023-09-06 DIAGNOSIS — Z91.018 ALLERGY TO OTHER FOODS: ICD-10-CM

## 2023-09-06 DIAGNOSIS — Z88.0 ALLERGY STATUS TO PENICILLIN: ICD-10-CM

## 2023-09-06 DIAGNOSIS — K21.9 GASTRO-ESOPHAGEAL REFLUX DISEASE WITHOUT ESOPHAGITIS: ICD-10-CM

## 2023-09-06 DIAGNOSIS — J45.909 UNSPECIFIED ASTHMA, UNCOMPLICATED: ICD-10-CM

## 2023-09-08 ENCOUNTER — APPOINTMENT (OUTPATIENT)
Dept: SURGERY | Facility: CLINIC | Age: 33
End: 2023-09-08
Payer: MEDICAID

## 2023-09-08 VITALS
DIASTOLIC BLOOD PRESSURE: 70 MMHG | TEMPERATURE: 96.5 F | HEART RATE: 110 BPM | BODY MASS INDEX: 42.9 KG/M2 | WEIGHT: 293 LBS | SYSTOLIC BLOOD PRESSURE: 110 MMHG | HEIGHT: 69.29 IN | OXYGEN SATURATION: 96 %

## 2023-09-08 DIAGNOSIS — L03.311 CELLULITIS OF ABDOMINAL WALL: ICD-10-CM

## 2023-09-08 PROCEDURE — 99024 POSTOP FOLLOW-UP VISIT: CPT

## 2023-09-09 PROBLEM — L03.311 CELLULITIS OF ABDOMINAL WALL: Status: ACTIVE | Noted: 2023-09-01

## 2023-09-09 NOTE — PLAN
[FreeTextEntry1] : RTO at end of September for 1 month follow up Continue to advance diet per protocol Local wound care

## 2023-09-09 NOTE — PHYSICAL EXAM
[Normal] : affect appropriate [de-identified] : No distress [de-identified] : S1, S2 [de-identified] : Nonlabored breathing [de-identified] : Larger incision well healing with scant purulence, no cellulitis [de-identified] : Normal gait

## 2023-09-18 ENCOUNTER — NON-APPOINTMENT (OUTPATIENT)
Age: 33
End: 2023-09-18

## 2023-09-20 ENCOUNTER — APPOINTMENT (OUTPATIENT)
Dept: SURGERY | Facility: CLINIC | Age: 33
End: 2023-09-20

## 2023-09-29 ENCOUNTER — APPOINTMENT (OUTPATIENT)
Dept: SURGERY | Facility: CLINIC | Age: 33
End: 2023-09-29

## 2023-10-18 NOTE — DISCHARGE NOTE NURSING/CASE MANAGEMENT/SOCIAL WORK - NSFLUVACAGEDISCH_IMM_ALL_CORE
[facility]  Vaccine Consent Form    Patient Name:  Zabrina Campos  Patient :  2020     Vaccine(s) Ordered    DTaP Vaccine Less Than 6yo IM  Hepatitis A Vaccine Pediatric / Adolescent 2 Dose IM  Fluzone (or Fluarix & Flulaval for VFC) >6mos        Screening Checklist  The following questions should be completed prior to vaccination. If you answer “yes” to any question, it does not necessarily mean you should not be vaccinated. It just means we may need to clarify or ask more questions. If a question is unclear, please ask your healthcare provider to explain it.    Yes No   Any fever or moderate to severe illness today (mild illness and/or antibiotic treatment are not contraindications)?     Do you have a history of a serious reaction to any previous vaccinations, such as anaphylaxis, encephalopathy within 7 days, Guillain-[place] syndrome within 6 weeks, seizure?     Have you received any live vaccine(s) in the past month (MMR, TUCKER)?     Do you have an anaphylactic allergy to latex (DTaP, DTaP-IPV, Hep A, Hep B, MenB, RV, Td, Tdap), baker’s yeast (Hep B, HPV), or gelatin (TUCKER, MMR)?     Do you have an anaphylactic allergy to neomycin (Rabies, TUCKER, MMR, IPV, Hep A), polymyxin B (IPV), or streptomycin (IPV)?      Any cancer, leukemia, AIDS, or other immune system disorder? (TUCKER, MMR, RV)     Do you have a parent, brother, or sister with an immune system problem (if immune competence of vaccine recipient clinically verified, can proceed)? (MMR, TUCKER)     Any recent steroid treatments for >2 weeks, chemotherapy, or radiation treatment? (TUCKER, MMR)     Have you received antibody-containing blood transfusions or IVIG in the past 11 months (recommended interval is dependent on product)? (MMR, TUCKER)     Have you taken antiviral drugs (acyclovir, famciclovir, valacyclovir) in the last 24 or 48 hours, respectively (TUCKER)?      Are you pregnant or planning to become pregnant within 1 month? (TUCKER, MMR, HPV, IPV,  MenB; For hep B- refer to Engerix-B)     For infants, have you ever been told your child has had intussusception or a medical emergency involving obstruction of the intestine (RV)? If not for an infant, can skip this question.         *Ordering Physician/APC should be consulted if “yes” is checked by the patient or guardian above.      I have received, read, and understand the Vaccine Information Statement (VIS) for each vaccine ordered above.  I have considered my health status as well as the health status of my close contacts.  I have taken the opportunity to discuss my vaccine questions with my health care provider.   I have requested that the ordered vaccine(s) be given to me.  I understand the benefits and risks of the vaccines.  I understand that I should remain in the clinic for 15 minutes after receiving the vaccine(s).  _________________________________________________________  Signature of Patient or Parent/Legal Guardian ____________________  Date      Adult

## 2023-11-02 NOTE — ED PROVIDER NOTE - NS ED ATTENDING NAME FT
CALL CENTER: See original GI encounter 10/19/2023. This is a duplicated encounter.     Add all return call messages to telephone encounter date 10/19/2023 (DO NOT use this encounter OR open a new encounter).          Rolan

## 2023-11-29 ENCOUNTER — APPOINTMENT (OUTPATIENT)
Dept: SURGERY | Facility: CLINIC | Age: 33
End: 2023-11-29

## 2023-12-05 NOTE — ED PROVIDER NOTE - CARDIAC, MLM
Patient reports readiness for discharge. PIV removed. Discharge instructions, prescriptions x 2, and follow-up care reviewed with patient. All questions answered and patient verbalized understanding. Vss. Patient stable and ambulatory upon d/c from ED.    Normal rate, regular rhythm.  Heart sounds S1, S2.  No murmurs, rubs or gallops.

## 2024-02-01 NOTE — ED ADULT TRIAGE NOTE - AS TEMP SITE
Pt called and stated apparently when she was in er in November they stopped her prilosec and she was not aware that they had. She wants to know if we were aware of this and if someone could tell her why. She also wants to know why she wasn't made aware.    oral

## 2024-07-15 ENCOUNTER — INPATIENT (INPATIENT)
Facility: HOSPITAL | Age: 34
LOS: 3 days | Discharge: HOME | DRG: 751 | End: 2024-07-19
Attending: PSYCHIATRY & NEUROLOGY | Admitting: PSYCHIATRY & NEUROLOGY
Payer: MEDICAID

## 2024-07-15 VITALS
DIASTOLIC BLOOD PRESSURE: 98 MMHG | RESPIRATION RATE: 18 BRPM | SYSTOLIC BLOOD PRESSURE: 147 MMHG | TEMPERATURE: 98 F | HEIGHT: 72 IN | OXYGEN SATURATION: 98 % | HEART RATE: 106 BPM | WEIGHT: 259.93 LBS

## 2024-07-15 DIAGNOSIS — F32.A DEPRESSION, UNSPECIFIED: ICD-10-CM

## 2024-07-15 DIAGNOSIS — Z90.3 ACQUIRED ABSENCE OF STOMACH [PART OF]: Chronic | ICD-10-CM

## 2024-07-15 DIAGNOSIS — Z98.890 OTHER SPECIFIED POSTPROCEDURAL STATES: Chronic | ICD-10-CM

## 2024-07-15 LAB
ANION GAP SERPL CALC-SCNC: 13 MMOL/L — SIGNIFICANT CHANGE UP (ref 7–14)
APAP SERPL-MCNC: <5 UG/ML — LOW (ref 10–30)
APPEARANCE UR: ABNORMAL
BACTERIA # UR AUTO: ABNORMAL /HPF
BASOPHILS # BLD AUTO: 0.02 K/UL — SIGNIFICANT CHANGE UP (ref 0–0.2)
BASOPHILS NFR BLD AUTO: 0.6 % — SIGNIFICANT CHANGE UP (ref 0–1)
BILIRUB UR-MCNC: NEGATIVE — SIGNIFICANT CHANGE UP
BUN SERPL-MCNC: 9 MG/DL — LOW (ref 10–20)
CALCIUM SERPL-MCNC: 8.5 MG/DL — SIGNIFICANT CHANGE UP (ref 8.4–10.5)
CHLORIDE SERPL-SCNC: 105 MMOL/L — SIGNIFICANT CHANGE UP (ref 98–110)
CO2 SERPL-SCNC: 23 MMOL/L — SIGNIFICANT CHANGE UP (ref 17–32)
COLOR SPEC: YELLOW — SIGNIFICANT CHANGE UP
CREAT SERPL-MCNC: 0.6 MG/DL — LOW (ref 0.7–1.5)
DIFF PNL FLD: ABNORMAL
EGFR: 121 ML/MIN/1.73M2 — SIGNIFICANT CHANGE UP
EOSINOPHIL # BLD AUTO: 0.04 K/UL — SIGNIFICANT CHANGE UP (ref 0–0.7)
EOSINOPHIL NFR BLD AUTO: 1.1 % — SIGNIFICANT CHANGE UP (ref 0–8)
EPI CELLS # UR: PRESENT
ETHANOL SERPL-MCNC: 88 MG/DL — HIGH
GLUCOSE SERPL-MCNC: 119 MG/DL — HIGH (ref 70–99)
GLUCOSE UR QL: NEGATIVE MG/DL — SIGNIFICANT CHANGE UP
HCG SERPL QL: NEGATIVE — SIGNIFICANT CHANGE UP
HCT VFR BLD CALC: 27.6 % — LOW (ref 37–47)
HGB BLD-MCNC: 8.1 G/DL — LOW (ref 12–16)
IMM GRANULOCYTES NFR BLD AUTO: 0 % — LOW (ref 0.1–0.3)
KETONES UR-MCNC: ABNORMAL MG/DL
LEUKOCYTE ESTERASE UR-ACNC: NEGATIVE — SIGNIFICANT CHANGE UP
LYMPHOCYTES # BLD AUTO: 1.45 K/UL — SIGNIFICANT CHANGE UP (ref 1.2–3.4)
LYMPHOCYTES # BLD AUTO: 40.4 % — SIGNIFICANT CHANGE UP (ref 20.5–51.1)
MCHC RBC-ENTMCNC: 19.9 PG — LOW (ref 27–31)
MCHC RBC-ENTMCNC: 29.3 G/DL — LOW (ref 32–37)
MCV RBC AUTO: 67.6 FL — LOW (ref 81–99)
MONOCYTES # BLD AUTO: 0.26 K/UL — SIGNIFICANT CHANGE UP (ref 0.1–0.6)
MONOCYTES NFR BLD AUTO: 7.2 % — SIGNIFICANT CHANGE UP (ref 1.7–9.3)
MUCOUS THREADS # UR AUTO: PRESENT
NEUTROPHILS # BLD AUTO: 1.82 K/UL — SIGNIFICANT CHANGE UP (ref 1.4–6.5)
NEUTROPHILS NFR BLD AUTO: 50.7 % — SIGNIFICANT CHANGE UP (ref 42.2–75.2)
NITRITE UR-MCNC: NEGATIVE — SIGNIFICANT CHANGE UP
NRBC # BLD: 0 /100 WBCS — SIGNIFICANT CHANGE UP (ref 0–0)
PH UR: 6 — SIGNIFICANT CHANGE UP (ref 5–8)
PLATELET # BLD AUTO: 300 K/UL — SIGNIFICANT CHANGE UP (ref 130–400)
PMV BLD: 9.3 FL — SIGNIFICANT CHANGE UP (ref 7.4–10.4)
POTASSIUM SERPL-MCNC: 3.8 MMOL/L — SIGNIFICANT CHANGE UP (ref 3.5–5)
POTASSIUM SERPL-SCNC: 3.8 MMOL/L — SIGNIFICANT CHANGE UP (ref 3.5–5)
PROT UR-MCNC: 100 MG/DL
RBC # BLD: 4.08 M/UL — LOW (ref 4.2–5.4)
RBC # FLD: 21.4 % — HIGH (ref 11.5–14.5)
RBC CASTS # UR COMP ASSIST: >50 /HPF — HIGH (ref 0–4)
SALICYLATES SERPL-MCNC: <0.3 MG/DL — LOW (ref 4–30)
SARS-COV-2 RNA SPEC QL NAA+PROBE: SIGNIFICANT CHANGE UP
SODIUM SERPL-SCNC: 141 MMOL/L — SIGNIFICANT CHANGE UP (ref 135–146)
SP GR SPEC: 1.03 — SIGNIFICANT CHANGE UP (ref 1–1.03)
SQUAMOUS # UR AUTO: 15 /HPF — HIGH (ref 0–5)
TRANS CELLS #/AREA URNS HPF: PRESENT
UROBILINOGEN FLD QL: 1 MG/DL — SIGNIFICANT CHANGE UP (ref 0.2–1)
WBC # BLD: 3.59 K/UL — LOW (ref 4.8–10.8)
WBC # FLD AUTO: 3.59 K/UL — LOW (ref 4.8–10.8)
WBC UR QL: 8 /HPF — HIGH (ref 0–5)

## 2024-07-15 PROCEDURE — 83550 IRON BINDING TEST: CPT

## 2024-07-15 PROCEDURE — 36415 COLL VENOUS BLD VENIPUNCTURE: CPT

## 2024-07-15 PROCEDURE — 80061 LIPID PANEL: CPT

## 2024-07-15 PROCEDURE — 84443 ASSAY THYROID STIM HORMONE: CPT

## 2024-07-15 PROCEDURE — 99285 EMERGENCY DEPT VISIT HI MDM: CPT

## 2024-07-15 PROCEDURE — 83036 HEMOGLOBIN GLYCOSYLATED A1C: CPT

## 2024-07-15 PROCEDURE — 82728 ASSAY OF FERRITIN: CPT

## 2024-07-15 PROCEDURE — 80053 COMPREHEN METABOLIC PANEL: CPT

## 2024-07-15 PROCEDURE — 83540 ASSAY OF IRON: CPT

## 2024-07-15 PROCEDURE — 90792 PSYCH DIAG EVAL W/MED SRVCS: CPT | Mod: 95

## 2024-07-15 PROCEDURE — 82607 VITAMIN B-12: CPT

## 2024-07-15 RX ORDER — LORAZEPAM 0.5 MG
2 TABLET ORAL EVERY 8 HOURS
Refills: 0 | Status: DISCONTINUED | OUTPATIENT
Start: 2024-07-15 | End: 2024-07-16

## 2024-07-15 RX ORDER — HALOPERIDOL DECANOATE 100 MG/ML
5 VIAL (ML) INTRAMUSCULAR EVERY 8 HOURS
Refills: 0 | Status: DISCONTINUED | OUTPATIENT
Start: 2024-07-15 | End: 2024-07-16

## 2024-07-15 RX ORDER — DIPHENHYDRAMINE HCL 12.5MG/5ML
25 ELIXIR ORAL AT BEDTIME
Refills: 0 | Status: DISCONTINUED | OUTPATIENT
Start: 2024-07-15 | End: 2024-07-16

## 2024-07-15 RX ORDER — ALBUTEROL 90 MCG
2 AEROSOL REFILL (GRAM) INHALATION EVERY 6 HOURS
Refills: 0 | Status: DISCONTINUED | OUTPATIENT
Start: 2024-07-15 | End: 2024-07-19

## 2024-07-15 RX ORDER — TETANUS TOXOID, REDUCED DIPHTHERIA TOXOID AND ACELLULAR PERTUSSIS VACCINE, ADSORBED 5; 2.5; 8; 8; 2.5 [IU]/.5ML; [IU]/.5ML; UG/.5ML; UG/.5ML; UG/.5ML
0.5 SUSPENSION INTRAMUSCULAR ONCE
Refills: 0 | Status: COMPLETED | OUTPATIENT
Start: 2024-07-15 | End: 2024-07-15

## 2024-07-15 RX ADMIN — TETANUS TOXOID, REDUCED DIPHTHERIA TOXOID AND ACELLULAR PERTUSSIS VACCINE, ADSORBED 0.5 MILLILITER(S): 5; 2.5; 8; 8; 2.5 SUSPENSION INTRAMUSCULAR at 18:39

## 2024-07-15 NOTE — ED PROVIDER NOTE - NSICDXPASTSURGICALHX_GEN_ALL_CORE_FT
PAST SURGICAL HISTORY:  H/O gastric sleeve 7/2021    History of esophagogastroduodenoscopy (EGD)     
01-Jan-2020 12:15

## 2024-07-15 NOTE — BH PATIENT PROFILE - NSGHSEXHXINAPP_PSY_ALL_CORE
Pt came into ED in [facility] custody for asthma flare-up   Pt stated that he uses an albuterol inhaler and nebulizer at home, but has not been able to use in skilled nursing   Pt in no acute distress  Denies any other complaints  
None

## 2024-07-15 NOTE — BH PATIENT PROFILE - NSPROMEDSBROUGHTTOHOSP_GEN_A_NUR
probably disease and medication  , diuretic on hold   improving renal function , continue lopressor  , continue florinef , consider decrease carbidopa frequency, stockings no

## 2024-07-15 NOTE — ED BEHAVIORAL HEALTH ASSESSMENT NOTE - RISK ASSESSMENT
risk factors: SI, recent suicidal behavior, hx of NSSIB, not engaged in tx    protective factors: domiciled, , engaged in work, denies access to guns

## 2024-07-15 NOTE — ED PROVIDER NOTE - PHYSICAL EXAMINATION
Vital Signs: I have reviewed the initial vital signs.  Constitutional: appears stated age, no acute distress  Eyes: Sclera clear, EOMI.  Cardiovascular: S1 and S2, regular rate, regular rhythm, well-perfused extremities, radial pulses equal and 2+, pedal pulses 2+ and equal  Respiratory: unlabored respiratory effort, clear to auscultation bilaterally no wheezing, rales, or rhonchi  Gastrointestinal:  abdomen soft, non-tender  Musculoskeletal: supple neck, no lower extremity edema  Integumentary: warm, dry, no rash, left wrist healed abrasions   Neurologic: awake, alert, oriented x3, extremities’ motor and sensory functions grossly intact

## 2024-07-15 NOTE — ED PROVIDER NOTE - ATTENDING APP SHARED VISIT CONTRIBUTION OF CARE
35 yo F with hx of asthma, GERD, RYLAND, gastric sleeve who presents with depression and SI since last night. Pt says she felt really sad yesterday, overwhelmed by "everything that's happening" and that "everyone would be happier if I were gone." She locked herself in her room and starting cutting her L wrist and R thigh with a knife. Her siblings and  urged her to come to ED today. Says she has plan to stab herself or "take something" to end her life. Denies HI, AH, VH. Has prior hx of self harm but never seen psychiatrist/therapist before or had [facility] admission. Lives with  at home and otherwise feels safe. No access to firearms. No medical complaints. Last tdap unknown.    PMD Dr. Rios    CONSTITUTIONAL: well developed, nontoxic appearing, in no acute distress, speaking in full sentences  SKIN: warm, dry, superficial linear abrasions to L wrist, cap refill < 2 seconds  HEENT: normocephalic, atraumatic, no conjunctival erythema, moist mucous membranes, patent airway  NECK: supple  CV:  regular rate, regular rhythm, 2+ radial pulses bilaterally  RESP: no wheezes, no rales, no rhonchi, normal work of breathing  ABD: soft, no tenderness, nondistended, no rebound, no guarding  MSK: normal ROM, no cyanosis, no edema  NEURO: alert, oriented, grossly unremarkable  PSYCH: cooperative, tearful    A&P:  Pt here with SI and self harm without established psychiatric disorder. Has superficial abrasions which do not appear infected and do not require repair. Plan for labs r/o toxic ingestion, infection, metabolic derangement. Placed on 1:1. Will c/s psych and update tdap.

## 2024-07-15 NOTE — BH PATIENT PROFILE - HOME MEDICATIONS
pantoprazole 40 mg oral delayed release tablet , 1 tab(s) orally once a day (before a meal)  enoxaparin , 40 milligram(s) subcutaneous once a day  acetaminophen 325 mg oral tablet , 3 tab(s) orally every 8 hours  Albuterol (Eqv-ProAir HFA) 90 mcg/inh inhalation aerosol , 2 puff(s) inhaled every 6 hours, As Needed  Multiple Vitamins oral capsule , 1 cap(s) orally once a day

## 2024-07-15 NOTE — ED PROVIDER NOTE - PROGRESS NOTE DETAILS
AY: Patient endorsed to telepsych. AY: Per telepsych, bed is available at [facility]. Patient currently is on her period, endorses no rectal bleeding. Denies other sources of bleeding.

## 2024-07-15 NOTE — ED PROVIDER NOTE - CLINICAL SUMMARY MEDICAL DECISION MAKING FREE TEXT BOX
Pt here with SI and self harm without established psychiatric disorder. Has superficial abrasions which do not appear infected and do not require repair. Plan for labs r/o toxic ingestion, infection, metabolic derangement. Placed on 1:1. Updated tdap. Labs notable for stable anemia similar to prior, etoh level 88 but pt clinically sober. Ekg with normal intervals. Psych consulted who recs voluntary admission. Pt requires admission given risk for danger to self, psychiatric decompensation, etc if not closely monitored and tx'ed.

## 2024-07-15 NOTE — ED BEHAVIORAL HEALTH ASSESSMENT NOTE - HPI (INCLUDE ILLNESS QUALITY, SEVERITY, DURATION, TIMING, CONTEXT, MODIFYING FACTORS, ASSOCIATED SIGNS AND SYMPTOMS)
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  Pt states that her  wanted her to come in because she gets sad a lot and yesterday "didn't want to be here."  She states she wanted to hurt herself yesterday by stabbing herself, reports that she had a knife and cut her arm and leg with hope that she would die, denies requiring medical intervention.  She is unable to identify a specific trigger, states she felt everybody would be better off if she wasn't here, has felt this way for a while.  She states that yesterday she "disappeared" for 4 hours, was not answering her phone while her  and siblings were looking for her.  She admits that she "just drove," had thoughts of crashing her car but pulled over instead.  She admits that she also had thought of taking a whole bottle of Tylenol also.  Pt reports mood has been "sad," sleeping more than usual, anhedonia at times, feeling tired often, decreased appetite (won't eat some days, losing weight though could be attributed to gastric sleeve surgery), isolating more, denies psychotic/manic symptoms.  When asked about current/active SI, pt uncertain.  Pt gives consent for telepsych to speak with her .     Writer spoke to pt's  (see  note for full details).  Pt's  states pt has hx of RYLAND in past but not anymore, no longer uses CPAP/BiPAP, only uses inhaler PRN.

## 2024-07-15 NOTE — ED BEHAVIORAL HEALTH ASSESSMENT NOTE - DOMICILED WITH
Patient no showed on 05/05/2022 with Dr. Armenta for a new hospital discharge appointment and labs. Writer will send patient letter to call and reschedule.    Family

## 2024-07-15 NOTE — ED BEHAVIORAL HEALTH ASSESSMENT NOTE - NS ED BHA PLAN LEGAL STATUS
"Assessment/Plan:  Augie is a 11 month old who presents for follow up of rash  Discussed appears fungal in nature still and given worsening, will treat with antifungal   Call if not improving  Parent expressed understanding and in agreement with plan  Diagnoses and all orders for this visit:    Rash  -     clotrimazole (LOTRIMIN) 1 % cream; Apply topically 3 (three) times a day for 14 days Applied to affected area 3 times a day for 10-14 days          Subjective: Augie presents with persistent rash - seen 4/10 for wc  Noted to be possible candidal rash - given nystatin  Since then, has had continued worsening of redness, some itchiness, crusting around the edges  Otherwise doing well, feeding, voiding stooling, acting normal self     Patient ID: Augie Ny is a 7 m o  male  Review of Systems  - per HPI    Objective:  Temp 97 2 °F (36 2 °C) (Temporal)   Ht 27 5\" (69 9 cm)   Wt 9 696 kg (21 lb 6 oz)   BMI 19 87 kg/m²      Physical Exam  Vitals and nursing note reviewed  Constitutional:       General: He is active  He is not in acute distress  Appearance: Normal appearance  He is well-developed  He is not toxic-appearing  HENT:      Head: Normocephalic and atraumatic  Anterior fontanelle is flat  Right Ear: Ear canal and external ear normal       Left Ear: Ear canal and external ear normal       Nose: Nose normal  No congestion  Mouth/Throat:      Mouth: Mucous membranes are moist       Pharynx: Oropharynx is clear  Eyes:      General: Red reflex is present bilaterally  Right eye: No discharge  Left eye: No discharge  Conjunctiva/sclera: Conjunctivae normal    Cardiovascular:      Rate and Rhythm: Regular rhythm  Heart sounds: Normal heart sounds  No murmur heard  Pulmonary:      Effort: Pulmonary effort is normal  No respiratory distress  Breath sounds: Normal breath sounds  Abdominal:      General: Abdomen is flat   Bowel sounds are normal       " Palpations: Abdomen is soft  Genitourinary:     Rectum: Normal    Musculoskeletal:         General: Normal range of motion  Cervical back: Neck supple  Right hip: Negative right Ortolani and negative right Hussein  Left hip: Negative left Ortolani and negative left Hussein  Skin:     General: Skin is warm  Capillary Refill: Capillary refill takes less than 2 seconds  Turgor: Normal       Comments: erythematous skin with some crusting on edges around anterior neck folds   Neurological:      General: No focal deficit present  Mental Status: He is alert  Primitive Reflexes: Suck normal  Symmetric Luciano  9.13

## 2024-07-15 NOTE — ED PROVIDER NOTE - CARE PLAN
Principal Discharge DX:	Suicidal thoughts   1 Principal Discharge DX:	Suicidal thoughts  Secondary Diagnosis:	Chronic anemia

## 2024-07-15 NOTE — ED PROVIDER NOTE - OBJECTIVE STATEMENT
34-year-old female denies significant past medical history presents with complaint of suicidal ideation.  States that last night she thought about taking medications to overdose or stabbing herself.  States she has a history of self-harm with cutting.  Denies fever/chills, chest pain, shortness of breath, abdominal pain, nausea/vomiting/diarrhea, change in bowel/bladder habits, lightheadedness, dizziness, focal numbness/weakness.

## 2024-07-15 NOTE — ED BEHAVIORAL HEALTH ASSESSMENT NOTE - DETAILS
deferred PCN as per HPI bipolar disorder and depression; father - hx of psych hospitalization arabella/becka CHANDLER d/w pt's

## 2024-07-15 NOTE — ED BEHAVIORAL HEALTH NOTE - BEHAVIORAL HEALTH NOTE
========================     FOR EACH COLLATERAL     ========================     NAME: Raymond Darby   NUMBER:    RELATIONSHIP:    RELIABILITY: Reliable historian.   COMMENTS: [facility] contacted collateral to assess patients baseline behavior and to learn background information the team can use to better assess the patient.      =======================     PATIENT DEMOGRAPHICS:     ========================     HPI   BASELINE FUNCTIONING: Patient is a 34-year-old female domiciled with her  (collateral). Collateral describes the patient's typical behavior during the day as “normal”. According to collateral, the patient enjoys watching television and “hanging out”. According to collateral, the patient has episodes of sadness that reportedly “doesn't last long”. According to collateral, when the patient begins to experience a sad mood, she becomes unmotivated, but it usually fades away. According to collateral, the patient does not have a history of substance use. Collateral reported no hx of violent behavior. Collateral did not report awareness of any suicidal expression.    DATE HPI STARTED: Last night   SUICIDALITY: Collateral did not report the patient to have SI.    VIOLENCE: None reported.    SUBSTANCE: Occasional social use.      ========================     PAST PSYCHIATRIC HISTORY     ========================     DATE PAST PSYCHIATRIC HISTORY STARTED: None reported.    MAIN PSYCHIATRIC DIAGNOSIS: None reported   PSYCHIATRIC HOSPITALIZATIONS: None reported   PRIOR ILLNESS: None reported   SUICIDALITY: Si a year ago but no plan according to collateral.    VIOLENCE: None reported   SUBSTANCE USE: Occasional social use.      ==============     OTHER HISTORY     ==============     CURRENT MEDICATION: None reported   MEDICAL HISTORY: Recent surgery related to weight loss.    ALLERGIES: None reported   LEGAL ISSUES: None reported   FIREARM ACCESS: None reported   SOCIAL HISTORY: None reported   FAMILY HISTORY: None reported.    DEVELOPMENTAL HISTORY: None reported.

## 2024-07-15 NOTE — ED BEHAVIORAL HEALTH ASSESSMENT NOTE - SUMMARY
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  Pt with SI, has thought of various plans, recent cutting with some suicidal intent.  Pt would benefit from admission for safety, stabilization, and connection to care.

## 2024-07-16 LAB
A1C WITH ESTIMATED AVERAGE GLUCOSE RESULT: 6.6 % — HIGH (ref 4–5.6)
CHOLEST SERPL-MCNC: 133 MG/DL — SIGNIFICANT CHANGE UP
ESTIMATED AVERAGE GLUCOSE: 143 MG/DL — HIGH (ref 68–114)
HDLC SERPL-MCNC: 69 MG/DL — SIGNIFICANT CHANGE UP
LIPID PNL WITH DIRECT LDL SERPL: 34 MG/DL — SIGNIFICANT CHANGE UP
NON HDL CHOLESTEROL: 64 MG/DL — SIGNIFICANT CHANGE UP
TRIGL SERPL-MCNC: 150 MG/DL — HIGH
TSH SERPL-MCNC: 4.3 UIU/ML — HIGH (ref 0.27–4.2)

## 2024-07-16 PROCEDURE — 99233 SBSQ HOSP IP/OBS HIGH 50: CPT

## 2024-07-16 PROCEDURE — 99221 1ST HOSP IP/OBS SF/LOW 40: CPT

## 2024-07-16 RX ORDER — LORAZEPAM 0.5 MG
1 TABLET ORAL EVERY 6 HOURS
Refills: 0 | Status: DISCONTINUED | OUTPATIENT
Start: 2024-07-16 | End: 2024-07-19

## 2024-07-16 RX ORDER — HYDROXYZINE PAMOATE 50 MG/1
25 CAPSULE ORAL EVERY 6 HOURS
Refills: 0 | Status: DISCONTINUED | OUTPATIENT
Start: 2024-07-16 | End: 2024-07-19

## 2024-07-16 RX ORDER — DIPHENHYDRAMINE HCL 12.5MG/5ML
25 ELIXIR ORAL EVERY 6 HOURS
Refills: 0 | Status: DISCONTINUED | OUTPATIENT
Start: 2024-07-16 | End: 2024-07-19

## 2024-07-16 RX ORDER — ESCITALOPRAM OXALATE 20 MG/1
5 TABLET, FILM COATED ORAL DAILY
Refills: 0 | Status: DISCONTINUED | OUTPATIENT
Start: 2024-07-16 | End: 2024-07-19

## 2024-07-16 RX ORDER — FERROUS SULFATE 325(65) MG
325 TABLET ORAL DAILY
Refills: 0 | Status: DISCONTINUED | OUTPATIENT
Start: 2024-07-16 | End: 2024-07-19

## 2024-07-16 RX ORDER — HALOPERIDOL DECANOATE 100 MG/ML
2 VIAL (ML) INTRAMUSCULAR EVERY 6 HOURS
Refills: 0 | Status: DISCONTINUED | OUTPATIENT
Start: 2024-07-16 | End: 2024-07-19

## 2024-07-16 RX ADMIN — Medication 325 MILLIGRAM(S): at 15:57

## 2024-07-16 RX ADMIN — Medication 2 PUFF(S): at 23:33

## 2024-07-16 RX ADMIN — ESCITALOPRAM OXALATE 5 MILLIGRAM(S): 20 TABLET, FILM COATED ORAL at 13:54

## 2024-07-16 RX ADMIN — Medication 2 PUFF(S): at 15:49

## 2024-07-16 NOTE — BH INPATIENT PSYCHIATRY ASSESSMENT NOTE - NSBHCHARTREVIEWINVESTIGATE_PSY_A_CORE FT
< from: 12 Lead ECG (07.15.24 @ 14:37) >    Ventricular Rate 95 BPM    Atrial Rate 95 BPM    P-R Interval 144 ms    QRS Duration 108 ms    Q-T Interval 376 ms    QTC Calculation(Bazett) 472 ms    P Axis 45 degrees    R Axis 28 degrees    T Axis 35 degrees    Diagnosis Line Normal sinus rhythm  Normal ECG    < end of copied text >

## 2024-07-16 NOTE — CONSULT NOTE ADULT - SUBJECTIVE AND OBJECTIVE BOX
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  Pt states that her  wanted her to come in because she gets sad a lot and yesterday "didn't want to be here."  She states she wanted to hurt herself yesterday by stabbing herself, reports that she had a knife and cut her arm and leg with hope that she would die, denies requiring medical intervention.  She is unable to identify a specific trigger, states she felt everybody would be better off if she wasn't here, has felt this way for a while.  She states that yesterday she "disappeared" for 4 hours, was not answering her phone while her  and siblings were looking for her.  She admits that she "just drove," had thoughts of crashing her car but pulled over instead.  She admits that she also had thought of taking a whole bottle of Tylenol also.  Pt reports mood has been "sad," sleeping more than usual, anhedonia at times, feeling tired often, decreased appetite (won't eat some days, losing weight though could be attributed to gastric sleeve surgery), isolating more, denies psychotic/manic symptoms.  When asked about current/active SI, pt uncertain.  Pt gives consent for telepsych to speak with her .     Writer spoke to pt's  (see  note for full details).  Pt's  states pt has hx of RYLAND in past but not anymore, no longer uses CPAP/BiPAP, only uses inhaler PRN. (15 Jul 2024 18:00)    FAMILY HISTORY:    PAST MEDICAL & SURGICAL HISTORY:  Obesity  BMI 50      RYLAND on CPAP  NOLONGER SINCE GASTRIC SLEEVE      GERD (gastroesophageal reflux disease)      Encounter for surveillance of transdermal patch hormonal contraceptive device      Asthma      History of esophagogastroduodenoscopy (EGD)      H/O gastric sleeve  2021      SOCIAL HISTORY  Social History:    Home Medications:  acetaminophen 325 mg oral tablet: 3 tab(s) orally every 8 hours (26 Aug 2023 07:08)  Albuterol (Eqv-ProAir HFA) 90 mcg/inh inhalation aerosol: 2 puff(s) inhaled every 6 hours, As Needed (08 Aug 2023 17:01)  enoxaparin: 40 milligram(s) subcutaneous once a day (26 Aug 2023 07:08)  Multiple Vitamins oral capsule: 1 cap(s) orally once a day (08 Aug 2023 17:01)  pantoprazole 40 mg oral delayed release tablet: 1 tab(s) orally once a day (before a meal) (26 Aug 2023 07:08)      ROS  General: Denies fevers, chills, nightsweats, weight loss  HEENT: Denies headache, rhinorrhea, sore throat, eye pain  CV: Denies CP, palpitations  PULM: Denies SOB, cough  GI: Denies abdominal pain, diarrhea  : Denies dysuria, hematuria  MSK: Denies arthralgias  SKIN: Denies rash   NEURO: Denies paresthesias, weakness  PSYCH: Denies depression    VITALS:  T(F): 98.6, Max: 99 (07-15-24 @ 19:54)  HR: 93  BP: 134/78  RR: 16Vital Signs Last 24 Hrs  T(C): 37 (2024 09:45), Max: 37.2 (15 Jul 2024 19:54)  T(F): 98.6 (2024 09:45), Max: 99 (15 Jul 2024 19:54)  HR: 93 (2024 09:45) (93 - 106)  BP: 134/78 (2024 09:45) (134/78 - 147/98)  BP(mean): --  RR: 16 (2024 09:45) (16 - 18)  SpO2: 98% (15 Jul 2024 18:52) (98% - 98%)    Parameters below as of 15 Jul 2024 18:52  Patient On (Oxygen Delivery Method): room air    PHYSICAL EXAM:  Gen: NAD, resting in bed  HEENT: Normocephalic, atraumatic  Neck: supple, no lymphadenopathy  CV: Regular rate & regular rhythm  Lungs: CTABL no wheeze  Abdomen: Soft, NTND+ BS present  Ext: Warm, well perfused no CCE  Neuro: non focal, awake, CN II-XII intact   Skin: no rash, no erythema  Psych: no SI, HI, Hallucination     TESTS & MEASUREMENTS:                        8.1    3.59  )-----------( 300      ( 15 Jul 2024 14:21 )             27.6     07-15    141  |  105  |  9<L>  ----------------------------<  119<H>  3.8   |  23  |  0.6<L>    Ca    8.5      15 Jul 2024 14:21          Urinalysis Basic - ( 15 Jul 2024 18:30 )    Color: Yellow / Appearance: Turbid / S.028 / pH: x  Gluc: x / Ketone: Trace mg/dL  / Bili: Negative / Urobili: 1.0 mg/dL   Blood: x / Protein: 100 mg/dL / Nitrite: Negative   Leuk Esterase: Negative / RBC: >50 /HPF / WBC 8 /HPF   Sq Epi: x / Non Sq Epi: 15 /HPF / Bacteria: Many /HPF        COVID-19 PCR: NotDetec (15 Jul 2024 14:21)      QRS axis to [] ° and NSR at a rate of [] BPM. There was no atrial enlargement. There was no ventricular hypertrophy. There were no ST-T changes and all intervals were normal.      INFECTIOUS DISEASES TESTING      RADIOLOGY & ADDITIONAL TESTS:  I have personally reviewed the last Chest xray  CXR      CT      CARDIOLOGY TESTING  12 Lead ECG:   Ventricular Rate 95 BPM    Atrial Rate 95 BPM    P-R Interval 144 ms    QRS Duration 108 ms    Q-T Interval 376 ms    QTC Calculation(Bazett) 472 ms    P Axis 45 degrees    R Axis 28 degrees    T Axis 35 degrees    Diagnosis Line Normal sinus rhythm  Normal ECG    Confirmed by Marlon Gil ([id number]) on 7/15/2024 3:58:38 PM (07-15-24 @ 14:37)      MEDICATIONS  (STANDING):  escitalopram 5 milliGRAM(s) Oral daily    MEDICATIONS  (PRN):  albuterol    90 MICROgram(s) HFA Inhaler 2 Puff(s) Inhalation every 6 hours PRN Shortness of Breath and/or Wheezing  diphenhydrAMINE 25 milliGRAM(s) Oral at bedtime PRN Insomnia  haloperidol     Tablet 5 milliGRAM(s) Oral every 8 hours PRN agitation  LORazepam     Tablet 2 milliGRAM(s) Oral every 8 hours PRN Agitation      ANTIBIOTICS:    All available historical data has been reviewed    ASSESSMENT  34y F admitted with Depression, GERD, asthma and Anemia( iron deficiency ?), gastric sleeve surgery, admitted to psych inpatient for depression.      PROBLEMS  mild Asthma   Iron deficiency anemia  GERD  hx of Gastric sleeve   MDD    plan:  - c/w care as per psych   - c/w albuterol prn   - start daily iron   - consider iron panel   - start PPI daily 20 mg if gerd symptoms      thank you for consulting medicine, please contact us for any further help.

## 2024-07-16 NOTE — BH SOCIAL WORK INITIAL PSYCHOSOCIAL EVALUATION - NSBHLEGALALTERNATE_PSY_ALL_CORE
Medication Requested and Last Rx written:   Adderall XR 20mg  12-28-21 for 12-30-21  Adderall XR 10mg  12-28-21    Last Rx dispensed (per PDMP):   Adderall XR 20mg  1-3-22  Adderall XR 10mg  1-5-22    Last office visit: 8-25-21  Follow up: 24 weeks  Cancelled/No Show appt: na  Upcoming office visit: 2/10/2022       Patient due, prepped if agreed    Routed to provider/ANGEL for  Review    
Medication:    Outpatient Medications Marked as Taking for the 1/27/22 encounter (Refill) with Celso Chavez MD   Medication Sig Dispense Refill   • amphetamine-dextroamphetamine (Adderall XR) 20 MG 24 hr capsule Take 1 capsule by mouth every morning. Do not start before December 30, 2021. 30 capsule 0   • amphetamine-dextroamphetamine XR (ADDERALL XR) 10 MG 24 hr capsule Do not start before November 30, 2021. Take 1 capsule by mouth in the afternoon, continue ER 20 mg in am 30 capsule 0       Message to Prescriber: NA    [x] Pharmacy has been verified.    [] Patient completely out of medication (*Route encounter as high priority if checked)    [x] Patient informed refill request can take up to 5 business days to be processed    Patient currently has follow up appointment scheduled      
No

## 2024-07-16 NOTE — BH INPATIENT PSYCHIATRY ASSESSMENT NOTE - NSBHMETABOLIC_PSY_ALL_CORE_FT
BMI: BMI (kg/m2): 42.5 (07-15-24 @ 19:54)  HbA1c: A1C with Estimated Average Glucose Result: 6.6 % (07-16-24 @ 08:05)    Glucose: POCT Blood Glucose.: 124 mg/dL (08-26-23 @ 07:24)    BP: 134/78 (07-16-24 @ 09:45) (134/78 - 147/98)Vital Signs Last 24 Hrs  T(C): 37 (07-16-24 @ 09:45), Max: 37.2 (07-15-24 @ 19:54)  T(F): 98.6 (07-16-24 @ 09:45), Max: 99 (07-15-24 @ 19:54)  HR: 93 (07-16-24 @ 09:45) (93 - 102)  BP: 134/78 (07-16-24 @ 09:45) (134/78 - 135/75)  BP(mean): --  RR: 16 (07-16-24 @ 09:45) (16 - 18)  SpO2: 98% (07-15-24 @ 18:52) (98% - 98%)    Orthostatic VS  07-15-24 @ 19:54  Lying BP: --/-- HR: --  Sitting BP: 147/93 HR: 96  Standing BP: --/-- HR: --  Site: upper right arm  Mode: electronic    Lipid Panel: Date/Time: 07-16-24 @ 08:05  Cholesterol, Serum: 133  LDL Cholesterol Calculated: 34  HDL Cholesterol, Serum: 69  Total Cholesterol/HDL Ration Measurement: --  Triglycerides, Serum: 150

## 2024-07-16 NOTE — BH SAFETY PLAN - WARNING SIGN 1
The reason I came to the ER is because I have depression, anxiety and lack of sleep. This was giving me suicidal ideations and scaring me, so my  and I came in.

## 2024-07-16 NOTE — UM REPORT PROGRESS NOTE - NSUMRMPROVIDER_GEN_A_CORE FT
[facility]  ID# [id number]  [phone]    7/16- Spoke with Derek YEN from [facility]. Pending auth# [id number]. Faxed clinicals to [phone]. Awaiting for CM assignment and determination. [facility]  ID# [id number]  [phone]    7/16- Spoke with Derek YEN from [facility]. Pending auth# [id number]. Faxed clinicals to [phone]. Awaiting for CM assignment and determination.  7/18 Joellen - received VM from Giselle at [facility]. Patient approved for 30 days. 7/15 to 8/13 with review on 8/14. Auth: [id number]. RENZO Desai phone [phone] [facility]  ID# [id number]  [phone]    7/16- Spoke with Derek YEN from [facility]. Pending auth# [id number]. Faxed clinicals to [phone]. Awaiting for CM assignment and determination.  7/18 Joellen - received VM from Giselle at [facility]. Patient approved for 30 days. 7/15 to 8/13 with review on 8/14. Auth: [id number]. RENZO Desai phone [phone]  7/19 Joellen - discharge clinicals faxed to [facility] at [phone]

## 2024-07-16 NOTE — BH INPATIENT PSYCHIATRY ASSESSMENT NOTE - NSBHCHARTREVIEWLAB_PSY_A_CORE FT
Complete Blood Count + Automated Diff (07.15.24 @ 14:21)   WBC Count: 3.59 K/uL  RBC Count: 4.08 M/uL  Hemoglobin: 8.1 g/dL  Hematocrit: 27.6 %  Mean Cell Volume: 67.6 fL  Mean Cell Hemoglobin: 19.9 pg  Mean Cell Hemoglobin Conc: 29.3 g/dL  Red Cell Distrib Width: 21.4 %  Platelet Count - Automated: 300 K/uL  MPV: 9.3 fL  Auto Neutrophil #: 1.82 K/uL  Auto Lymphocyte #: 1.45 K/uL  Auto Monocyte #: 0.26 K/uL  Auto Eosinophil #: 0.04 K/uL  Auto Basophil #: 0.02 K/uL  Auto Neutrophil %: 50.7: Differential percentages must be correlated with absolute numbers for   clinical significance. %  Auto Lymphocyte %: 40.4 %  Auto Monocyte %: 7.2 %  Auto Eosinophil %: 1.1 %  Auto Basophil %: 0.6 %  Auto Immature Granulocyte %: 0.0: (Includes meta, myelo and promyelocytes). Mild elevations in immature   granulocytes may be seen with many inflammatory processes and pregnancy;   clinical correlation suggested. %  Nucleated RBC: 0 /100 WBCs

## 2024-07-16 NOTE — BH SAFETY PLAN - INTERNAL COPING STRATEGY 1
I like to listen to music and cook for my family. Those things make me relax and take my mind off my problems.

## 2024-07-16 NOTE — BH SAFETY PLAN - CRISIS CLINICIAN NAME 1
CHIEF COMPLAINT/HISTORY OF PRESENT ILLNESS:  Amanda Felton is a 66 year old female patient of John Rubio MD, who presents for preoperative physical.     Preoperative medical evaluation at the request of Dr. Eddy Chu  Date of Surgery: 2/4/22  Site: [facility]   Procedure: RIGHT BUNIONECTOMY WITH OSTEOTOMY     History of previous surgeries? see surgical history below  History of anesthesia problems? none  Family history of anesthesia problems? none  Previous history of blood clots/pulmonary embolism? 2013 PE completed anticoagulation.   History of heart attack? none  History of kidney problems? none  Any chest pain or shortness of breath when climbing two flights of stairs at a normal speed? none  Use of aspirin, anti-inflammatories, or other blood thinners? Baby Aspirin, occasional use of Advil.   History of sleep apnea? none  Recent illness? none    Health Problems    -Bunion right toe with ulcer. Surgery planned.   -DM. On metformin. Last A1c 6.7%. Home sugars running 140-160.    -HTN. Last creatinine 0.97. LDL cholesterol 115.   -Depression- stable on prozac. A year since her mom passed away this February.   -Neuropathy- minor tingling and numbness sensation in hands, but no pain.   -Thyroid nodules. Prior saw endocrine, recommended yearly TSH- no need for repeat imaging  -Lipid- recently switched to crestor. Had myalgias with atorvastatin.   - She gets occasional sores around her groin, labia, buttocks- and has historically treated with metronidazole gel which is effective- she requests refill    Patient Care Team:  John Rubio MD as PCP - General (Internal Medicine)  Maxwell Perez MD (Med/Peds)    Past Medical History:   Diagnosis Date   • Depression    • Diabetes type 2, controlled (CMS/HCC)    • Hyperlipemia    • Hypertension        Past Surgical History:   Procedure Laterality Date   • ------------other-------------      ablation    • Colonoscopy  01/01/2011   • Gallbladder  surgery         ALLERGIES:   Allergen Reactions   • Lansoprazole ANAPHYLAXIS   • Nitrofurantoin THROAT SWELLING   • Penicillins ANAPHYLAXIS   • Atenolol Other (See Comments)     Reaction:  unknown   • Epinephrine Palpitations       Current Outpatient Medications   Medication Sig Dispense Refill   • metroNIDAZOLE (METROCREAM) 0.75 % cream Apply topically as needed (sores). 45 g 3   • rosuvastatin (Crestor) 5 MG tablet Take 1 tablet by mouth daily. 90 tablet 3   • hydrochlorothiazide (HYDRODIURIL) 25 MG tablet Take 1 tablet by mouth daily. 90 tablet 3   • mupirocin (BACTROBAN) 2 % ointment Apply topically 2 times daily. Apply to the big toe and second toe right foot twice a day. 30 g 0   • metFORMIN (GLUCOPHAGE) 500 MG tablet Take 1000 mg AM, 500 mg  tablet 3   • losartan (COZAAR) 100 MG tablet Take 1 tablet by mouth daily. 90 tablet 3   • FLUoxetine (PROzac) 20 MG capsule Take 1 capsule by mouth nightly. 90 capsule 3   • Accu-Chek Maris Plus test strip TEST TWICE DAILY     • aspirin 81 MG tablet Take 81 mg by mouth daily.     • B Complex Vitamins (VITAMIN B COMPLEX PO)      • cholecalciferol (VITAMIN D3) 1000 UNITS tablet Take 2,000 Units by mouth daily.       No current facility-administered medications for this visit.       No family history on file.    Social History     Tobacco Use   • Smoking status: Never Smoker   • Smokeless tobacco: Never Used   Vaping Use   • Vaping Use: never used   Substance Use Topics   • Alcohol use: Yes     Alcohol/week: 0.0 standard drinks     Comment: occ   • Drug use: Never       REVIEW OF SYSTEMS:    Review of Systems   Constitutional: Negative.    HENT: Negative.    Eyes: Negative.    Respiratory: Negative.    Cardiovascular: Negative.    Gastrointestinal: Negative.    Genitourinary: Negative.    Musculoskeletal: Negative.    Neurological: Positive for numbness. Negative for dizziness, tremors, syncope, speech difficulty, weakness, light-headedness and headaches.    Psychiatric/Behavioral: Negative.         PHYSICAL EXAMINATION:    Visit Vitals  /78 (BP Location: LUE - Left upper extremity, Patient Position: Sitting, Cuff Size: Large Adult)   Pulse 81   Ht 5' 5\" (1.651 m)   Wt (!) 153.3 kg (338 lb)   LMP 01/01/2006 (Within Years)   SpO2 97%   BMI 56.25 kg/m²     BP Readings from Last 4 Encounters:   01/25/22 124/78   12/23/21 (!) 162/74   12/02/21 (!) 179/72   11/18/21 (!) 169/73       Wt Readings from Last 4 Encounters:   01/25/22 (!) 153.3 kg (338 lb)   11/16/21 (!) 152.4 kg (336 lb)   11/04/21 (!) 153.9 kg (339 lb 3.2 oz)   10/25/21 (!) 154.7 kg (341 lb 0.8 oz)        Physical Exam  Constitutional:       General: She is not in acute distress.     Appearance: She is obese.   HENT:      Head: Normocephalic and atraumatic.      Right Ear: Tympanic membrane, ear canal and external ear normal.      Left Ear: Tympanic membrane, ear canal and external ear normal.      Nose: Nose normal.      Mouth/Throat:      Mouth: Mucous membranes are moist.      Pharynx: No oropharyngeal exudate or posterior oropharyngeal erythema.      Neck: Normal range of motion. No tenderness.   Eyes:      General: No scleral icterus.        Right eye: No discharge.         Left eye: No discharge.      Extraocular Movements: Extraocular movements intact.      Conjunctiva/sclera: Conjunctivae normal.      Pupils: Pupils are equal, round, and reactive to light.   Cardiovascular:      Rate and Rhythm: Normal rate and regular rhythm.      Heart sounds: Normal heart sounds. No murmur heard.  Pulmonary:      Effort: Pulmonary effort is normal. No respiratory distress.      Breath sounds: No wheezing, rhonchi or rales.   Abdominal:      General: Bowel sounds are normal. There is no distension.      Palpations: Abdomen is soft. There is no mass.      Tenderness: There is no abdominal tenderness. There is no guarding or rebound.   Musculoskeletal:         General: No swelling, tenderness or deformity. Normal  range of motion.      Right lower leg: No edema.      Left lower leg: No edema.   Lymphadenopathy:      Cervical: No cervical adenopathy.   Skin:     Coloration: Skin is not jaundiced.      Findings: No bruising, erythema or rash.   Neurological:      General: No focal deficit present.      Mental Status: She is alert and oriented to person, place, and time.      Cranial Nerves: No cranial nerve deficit.      Sensory: No sensory deficit.      Motor: No weakness.      Coordination: Coordination normal.      Gait: Gait normal.   Psychiatric:         Mood and Affect: Mood normal.         Behavior: Behavior normal.         Thought Content: Thought content normal.         Judgment: Judgment normal.          LABS  CBC   Recent Labs   Lab 09/15/21  1208   WBC 10.6   HGB 12.0   HCT 38.8      Neutrophil, Percent 65     CMP  Recent Labs   Lab 21  0805 09/15/21  1208 21  0830   Sodium 139 140 139   Potassium 4.0 4.0 3.8   Chloride 105 104 104   Carbon Dioxide 27 27 29   BUN 20 20 24*   Creatinine 0.84 0.94 0.97*   Glucose 160* 200* 141*   GOT/AST 18 17 13   Bilirubin, Total 0.4 0.4 0.3   Albumin 3.3* 3.2* 3.2*       Recent Labs     21  0805   HGBA1C 6.8*   HDL 41*   CALCLDL 122   TRIGLYCERIDE 232*   NONHDL 168        TSH (mcUnits/mL)   Date Value   09/15/2021 1.518     Vitamin D, 25-Hydroxy (ng/mL)   Date Value   2021 26.5 (L)       No results found for: INR     IMAGING    Results for orders placed or performed in visit on 22   ELECTROCARDIOGRAM 12-LEAD    Narrative    Test was performed.  :1956  AGE:66 year old  Ordering provider: Radha Villasenor NP   Diagnosis: Preop examination  (primary encounter diagnosis)  Diabetic ulcer of toe of right foot associated with type 2 diabetes mellitus, limited to breakdown of skin (CMS/HCC)  Diabetic peripheral neuropathy associated with type 2 diabetes mellitus (CMS/HCC)  Hypertension, unspecified type  Multiple thyroid nodules  Morbid obesity with  body mass index of 50.0-59.9 in adult (CMS/HCC)  Mild major depression (CMS/HCC)         XR FOOT 3+ VW STANDING RIGHT    Result Date: 1/5/2022  Narrative: XR FOOT 3+ VW STANDING RIGHT HISTORY: Right foot pain, acquired hallux varus. COMPARISON: 11/18/2021 toe radiograph. TECHNIQUE:  3 views of the right foot. FINDINGS: No acute fracture or dislocation. Hallux valgus. Severe narrowing of the first interphalangeal joint, also with lateral subluxation. Mild degenerative changes of the first MTP joint and TMT joint. Chronic fragmentation of the 1st MTP joint medial sesamoid, possibly degenerative or bipartite/multipartite variant. Mild osteophytes throughout the remainder of the foot. Hammertoe deformities limit evaluation of the interphalangeal joints. Previous resection arthroplasty of the 5th PIP joint. Probable old fracture deformities 2nd and 3rd proximal phalanges. Achilles and plantar calcaneal enthesophytes. Mild hindfoot valgus and pes planus. Calcifications within the soft tissues of the medial distal leg, possibly related to chronic venous stasis.     Impression: IMPRESSION: Chronic degenerative changes of the foot as described. I, Attending Radiologist Gerry Mendez MD, have reviewed the images and report and concur with these findings interpreted by Resident Radiologist, James Leschke, DO.       Health Maintenance Summary     Diabetes Foot Exam (Yearly)  Overdue since 6/13/2019    Diabetes Eye Exam (Yearly)  Overdue since 1/15/2021    COVID-19 Vaccine (3 - Booster for Pfizer series)  Overdue since 9/9/2021    Medicare Wellness Visit (Yearly)  Due since 1/1/2022    Osteoporosis Screening (Once)  Due since 1/16/2022    Depression Screening (Yearly)  Due soon on 5/17/2022    Diabetes A1C (Every 6 Months)  Ordered on 11/16/2021    DM/CKD GFR (Yearly)  Ordered on 1/25/2022    Breast Cancer Screening (Every 2 Years)  Next due on 12/16/2023    Colorectal Cancer Screen- (Colonoscopy - Every 10 Years)  Next due on  5/28/2025    DTaP/Tdap/Td Vaccine (2 - Td or Tdap)  Next due on 8/31/2025    Pneumococcal Vaccine 65+ (2 of 2 - PPSV23)  Next due on 11/20/2025    Hepatitis C Screening   Completed    Shingles Vaccine   Completed    Influenza Vaccine   Completed    Hepatitis B Vaccine   Aged Out    Meningococcal Vaccine   Aged Out    HPV Vaccine   Aged Out        Immunization History   Administered Date(s) Administered   • COVID-19 12Y+ Pfizer-BioNtech - Requires Dilution 03/19/2021, 04/09/2021   • Influenza, injectable, MDCK, preservative free, quadrivalent 10/17/2018   • Influenza, injectable, quadrivalent 10/25/2016, 10/10/2017   • Influenza, injectable, quadrivalent, preservative-free 10/09/2019, 10/19/2020, 11/16/2021   • Influenza, seasonal, injectable, trivalent 10/26/2016   • Pneumococcal polysaccharide, adult, 23 valent 11/20/2020   • Shingles Zoster (Shingrix) 05/20/2021, 08/06/2021   • Tdap 08/31/2015       ASSESSMENT AND PLAN        Preop examination  Patient is at Class I, 3.9% risk of major cardiac complication based on the Revised Cardiac Risk Index. Risks were discussed with patient.  Patient is medically stable at todays visit. So long as preoperative testing results are appropriate, I see no contraindication to planned procedure.  EKG: normal sinus rhythm    - COMPREHENSIVE METABOLIC PANEL; Future  - CBC WITH DIFFERENTIAL; Future  - ELECTROCARDIOGRAM 12-LEAD    Bunion  Diabetic ulcer of toe of right foot associated with type 2 diabetes mellitus, limited to breakdown of skin (CMS/HCC)  Surgery to toe as planned    Diabetic peripheral neuropathy associated with type 2 diabetes mellitus (CMS/HCC)  No pain    Hypertension, unspecified type  Controlled on current medications    Multiple thyroid nodules  Has seen endocrine, recommended yearly TSH- no need for repeat imaging    Morbid obesity with body mass index of 50.0-59.9 in adult (CMS/HCC)    Mild major depression (CMS/HCC)  Coming up on year anniversary of her  mother's death. Stable on prozac      Orders Placed This Encounter   • Comprehensive Metabolic Panel   • CBC with Automated Differential   • Electrocardiogram 12-Lead     Order Specific Question:   Will you be reading the ECG?     Answer:   No   • metroNIDAZOLE (METROCREAM) 0.75 % cream     Sig: Apply topically as needed (sores).     Dispense:  45 g     Refill:  3       PRE-OP INSTRUCTIONS:  Follow all instructions per surgery team  · 7 days prior to surgery:  · No use of ibuprofen, naproxen, or other nonsteroidal anti-inflammatory drugs 7 days prior to surgery.   · For pain -may take Tylenol (maximum of 3000 mg per day) as needed for pain up to night prior to surgery  · Stop all vitamins, supplements and other over-the-counter medications 7 days before surgery.   · No use of aspirin 7 days prior to surgery.   · 24 Hours prior to surgery:   · No food 8 hours before surgery, or per surgeon instructions  · Day of surgery:  · For your blood pressure, continue blood pressure meds on day of surgery with small sip of water: losartan, hydrochlorothiazide   ·  For your diabetes:  · The night before surgery: Hold your metformin   · Morning of surgery: Do not take your oral diabetic medications    A copy of this preoperative exam has been forwarded to surgeon.     [facility]

## 2024-07-16 NOTE — BH INPATIENT PSYCHIATRY ASSESSMENT NOTE - NSBHASSESSSUMMFT_PSY_ALL_CORE
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  Pt states that her  wanted her to come in because she gets sad a lot and yesterday "didn't want to be here."  She states she wanted to hurt herself yesterday by stabbing herself, reports that she had a knife and cut her arm and leg with hope that she would die, denies requiring medical intervention.  She is unable to identify a specific trigger, states she felt everybody would be better off if she wasn't here, has felt this way for a while.  She states that yesterday she "disappeared" for 4 hours, was not answering her phone while her  and siblings were looking for her.  She admits that she "just drove," had thoughts of crashing her car but pulled over instead.  She admits that she also had thought of taking a whole bottle of Tylenol also.  Pt reports mood has been "sad," sleeping more than usual, anhedonia at times, feeling tired often, decreased appetite (won't eat some days, losing weight though could be attributed to gastric sleeve surgery), isolating more, denies psychotic/manic symptoms.  When asked about current/active SI, pt uncertain.  Pt gives consent for telepsych to speak with her .     Writer spoke to pt's  (see  note for full details).  Pt's  states pt has hx of RYLAND in past but not anymore, no longer uses CPAP/BiPAP, only uses inhaler PRN.    #Admit    #MDD  -Lexapro 5mg daily    #Anemia  - start daily iron   - consider iron panel    #Asthma  -Albuterol PRN    -Hydroxyzine 25mg Q6 PRN for anxiety or insomnia  -Haldol 2mg Q6 PRN for agitation or psychosis  -Benadryl 25mg Q6 PRN for EPS  -Lorazepam 1mg Q6 PRN for aggression    #Agitation  -for agitation not amenable to verbal redirection, may give haldol 5 mg q6h prn, ativan 2 mg q6h prn, benadryl 50 mg q6h prn with escalation to IM if pt is a danger to self or/and others with repeat EKG to ensure QTc <500 ms.     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  Pt states that her  wanted her to come in because she gets sad a lot and yesterday "didn't want to be here."  She states she wanted to hurt herself yesterday by stabbing herself, reports that she had a knife and cut her arm and leg with hope that she would die, denies requiring medical intervention.  She is unable to identify a specific trigger, states she felt everybody would be better off if she wasn't here, has felt this way for a while.  She states that yesterday she "disappeared" for 4 hours, was not answering her phone while her  and siblings were looking for her.  She admits that she "just drove," had thoughts of crashing her car but pulled over instead.  She admits that she also had thought of taking a whole bottle of Tylenol also.  Pt reports mood has been "sad," sleeping more than usual, anhedonia at times, feeling tired often, decreased appetite (won't eat some days, losing weight though could be attributed to gastric sleeve surgery), isolating more, denies psychotic/manic symptoms.  When asked about current/active SI, pt uncertain.  Pt gives consent for telepsych to speak with her .     Patient seen and evaluated on IPP. As per nursing report no acute events. Patient presents as depressed. States she has been feeling down for a while. States there at days when she feels down and then days when she feels fine. Verbalized difficulty with psychosocial stressors, parents medical issues, going back to school ect. Had an argument with father recently. Felt very down, too the car for a drive and though about crashing the care but didn't. Went to a friends house and her  requested she come in. Also that day took a knife and superficially cut left forearm. states none of this has ever happened before. Has never been in treatment or on any medication. Had Bariatric surgery about 8months ago. Writer discussed patients undergoing bariatric surgery are at increased risk for depression and suicide, so close follow up by psychiatry for at least a one year period would be beneficial. Writer discussed medication options, risks and benefits. Patient verbalized understanding. Patient denies any drug use and drink alcohol occasionally. Drank some alcohol that day. Denies any s/s of withdrawal, withdrawal seizures. No s/s of withdrawal noted. Team authorized to call patients  Raymond. Case discussed with Attending psychiatrist Dr. Ordonez.    #Admit    #MDD  -Lexapro 5mg daily    #Anemia  - start daily iron   - consider iron panel    #Asthma  -Albuterol PRN    -Hydroxyzine 25mg Q6 PRN for anxiety or insomnia  -Haldol 2mg Q6 PRN for agitation or psychosis  -Benadryl 25mg Q6 PRN for EPS  -Lorazepam 1mg Q6 PRN for aggression    #Agitation  -for agitation not amenable to verbal redirection, may give haldol 5 mg q6h prn, ativan 2 mg q6h prn, benadryl 50 mg q6h prn with escalation to IM if pt is a danger to self or/and others with repeat EKG to ensure QTc <500 ms.

## 2024-07-16 NOTE — BH SAFETY PLAN - ENVIRONMENT SAFETY 1:
I don't want to feel sad anymore. I want to be there for my  and kids and if do whatever I can to get past this depression. I am going to try an anti depression medication for the first time and also therapy.

## 2024-07-16 NOTE — BH CHART NOTE - NSEVENTNOTEFT_PSY_ALL_CORE
Spoke with the  Raymond, [phone] and stated she has been depressed and stressed for a couple of weeks now. Pt tried to scratch her arm with a knife and states she doesn't want to live anymore. Pt took her car and drove off stated she wanted to crash it but did not. Today pt has not had any suicidal thoughts to harm herself.

## 2024-07-16 NOTE — BH INPATIENT PSYCHIATRY ASSESSMENT NOTE - CURRENT MEDICATION
MEDICATIONS  (STANDING):  escitalopram 5 milliGRAM(s) Oral daily  ferrous    sulfate 325 milliGRAM(s) Oral daily    MEDICATIONS  (PRN):  albuterol    90 MICROgram(s) HFA Inhaler 2 Puff(s) Inhalation every 6 hours PRN Shortness of Breath and/or Wheezing  diphenhydrAMINE 25 milliGRAM(s) Oral at bedtime PRN Insomnia  haloperidol     Tablet 5 milliGRAM(s) Oral every 8 hours PRN agitation  LORazepam     Tablet 2 milliGRAM(s) Oral every 8 hours PRN Agitation   MEDICATIONS  (STANDING):  escitalopram 5 milliGRAM(s) Oral daily  ferrous    sulfate 325 milliGRAM(s) Oral daily    MEDICATIONS  (PRN):  albuterol    90 MICROgram(s) HFA Inhaler 2 Puff(s) Inhalation every 6 hours PRN Shortness of Breath and/or Wheezing  diphenhydrAMINE 25 milliGRAM(s) Oral every 6 hours PRN EPS  haloperidol     Tablet 2 milliGRAM(s) Oral every 6 hours PRN Agitation  hydrOXYzine hydrochloride 25 milliGRAM(s) Oral every 6 hours PRN Anxiety and or insomnia  LORazepam     Tablet 1 milliGRAM(s) Oral every 6 hours PRN Aggression

## 2024-07-16 NOTE — BH INPATIENT PSYCHIATRY ASSESSMENT NOTE - NSBHATTESTAPPBILLTIME_PSY_A_CORE
I attest my time as NADEEM is greater than 50% of the total combined time spent on qualifying patient care activities. I have reviewed and verified the documentation.

## 2024-07-16 NOTE — BH INPATIENT PSYCHIATRY ASSESSMENT NOTE - NSBHCHARTREVIEWVS_PSY_A_CORE FT
Vital Signs Last 24 Hrs  T(C): 37 (07-16-24 @ 09:45), Max: 37.2 (07-15-24 @ 19:54)  T(F): 98.6 (07-16-24 @ 09:45), Max: 99 (07-15-24 @ 19:54)  HR: 93 (07-16-24 @ 09:45) (93 - 102)  BP: 134/78 (07-16-24 @ 09:45) (134/78 - 135/75)  BP(mean): --  RR: 16 (07-16-24 @ 09:45) (16 - 18)  SpO2: 98% (07-15-24 @ 18:52) (98% - 98%)    Orthostatic VS  07-15-24 @ 19:54  Lying BP: --/-- HR: --  Sitting BP: 147/93 HR: 96  Standing BP: --/-- HR: --  Site: upper right arm  Mode: electronic

## 2024-07-16 NOTE — PSYCHIATRIC REHAB INITIAL EVALUATION - NSBHEMPLOYED_PSY_ALL_CORE
-- DO NOT REPLY / DO NOT REPLY ALL --  -- Message is from Engagement Center Operations ([facility]) --    General Patient Message:     Patients Home health care nurse bev York  said she missed the the call from Mary Atwood yesterday, requesting call back  [phone]     Caller Information       Type Contact Phone/Fax    02/09/2023 09:18 AM CST Phone (Incoming) Bev (Other) [phone]        Alternative phone number: none  Can a detailed message be left? Yes    Message Turnaround:     Is it Working Hours? Yes - Working Hours     [place]:    Please give this turnaround time to the caller:   \"This message will be sent to [state Provider's name]. The clinical team will fulfill your request as soon as they review your message.\"                
Full time

## 2024-07-17 LAB
ALBUMIN SERPL ELPH-MCNC: 4.1 G/DL — SIGNIFICANT CHANGE UP (ref 3.5–5.2)
ALP SERPL-CCNC: 121 U/L — HIGH (ref 30–115)
ALT FLD-CCNC: 20 U/L — SIGNIFICANT CHANGE UP (ref 0–41)
ANION GAP SERPL CALC-SCNC: 12 MMOL/L — SIGNIFICANT CHANGE UP (ref 7–14)
AST SERPL-CCNC: 18 U/L — SIGNIFICANT CHANGE UP (ref 0–41)
BILIRUB SERPL-MCNC: 0.4 MG/DL — SIGNIFICANT CHANGE UP (ref 0.2–1.2)
BUN SERPL-MCNC: 7 MG/DL — LOW (ref 10–20)
CALCIUM SERPL-MCNC: 9 MG/DL — SIGNIFICANT CHANGE UP (ref 8.4–10.5)
CHLORIDE SERPL-SCNC: 102 MMOL/L — SIGNIFICANT CHANGE UP (ref 98–110)
CO2 SERPL-SCNC: 24 MMOL/L — SIGNIFICANT CHANGE UP (ref 17–32)
CREAT SERPL-MCNC: 0.6 MG/DL — LOW (ref 0.7–1.5)
EGFR: 121 ML/MIN/1.73M2 — SIGNIFICANT CHANGE UP
FERRITIN SERPL-MCNC: 18 NG/ML — SIGNIFICANT CHANGE UP (ref 15–150)
GLUCOSE SERPL-MCNC: 139 MG/DL — HIGH (ref 70–99)
IRON SATN MFR SERPL: 40 UG/DL — SIGNIFICANT CHANGE UP (ref 35–150)
IRON SATN MFR SERPL: 8 % — LOW (ref 15–50)
POTASSIUM SERPL-MCNC: 3.8 MMOL/L — SIGNIFICANT CHANGE UP (ref 3.5–5)
POTASSIUM SERPL-SCNC: 3.8 MMOL/L — SIGNIFICANT CHANGE UP (ref 3.5–5)
PROT SERPL-MCNC: 7 G/DL — SIGNIFICANT CHANGE UP (ref 6–8)
SODIUM SERPL-SCNC: 138 MMOL/L — SIGNIFICANT CHANGE UP (ref 135–146)
TIBC SERPL-MCNC: 483 UG/DL — HIGH (ref 220–430)
UIBC SERPL-MCNC: 443 UG/DL — HIGH (ref 110–370)
VIT B12 SERPL-MCNC: 185 PG/ML — LOW (ref 232–1245)

## 2024-07-17 PROCEDURE — 99232 SBSQ HOSP IP/OBS MODERATE 35: CPT

## 2024-07-17 RX ADMIN — ESCITALOPRAM OXALATE 5 MILLIGRAM(S): 20 TABLET, FILM COATED ORAL at 08:12

## 2024-07-17 RX ADMIN — Medication 325 MILLIGRAM(S): at 08:12

## 2024-07-17 NOTE — BH INPATIENT PSYCHIATRY PROGRESS NOTE - NSBHFUPINTERVALHXFT_PSY_A_CORE
Patient seen and evaluated. As per nursing report no acute events. On approach patient calm and cooperative. No agitation or aggression noted. Patient states she had a good night. Denies any suicidal ideations. Able to contract for safety. Started on Lexapro yesterday. Denies any side effects/adverse reaction. No side effects/adverse reactions noted.  Patient seen and evaluated. As per nursing report no acute events. On approach patient calm and cooperative. No agitation or aggression noted. Patient states she had a good night. Denies any suicidal ideations. Able to contract for safety. Started on Lexapro yesterday. Denies any side effects/adverse reaction. No side effects/adverse reactions noted. Patient signed a 72 hour letter. Patient is voluntary. Discussed coping skills to use when discharged. Patient expressed feeling like Outpatient therapy will be beneficial. Patient also states her  is very supportive and she will go to him for help when needed. Patient encouraged to go to groups. Anticipated discharge Friday 7/19/24, provided patient continues to improve.     Spoke with the  Raymond, [phone]. Updated him on patients care and progress, anticipated discharge.  verbalized understanding and has no concerns for discharge.

## 2024-07-17 NOTE — BH INPATIENT PSYCHIATRY PROGRESS NOTE - NSBHCHARTREVIEWVS_PSY_A_CORE FT
Vital Signs Last 24 Hrs  T(C): 36.8 (07-17-24 @ 07:50), Max: 36.8 (07-17-24 @ 07:50)  T(F): 98.2 (07-17-24 @ 07:50), Max: 98.2 (07-17-24 @ 07:50)  HR: 577 (07-17-24 @ 07:50) (80 - 577)  BP: 132/76 (07-17-24 @ 07:50) (132/76 - 133/85)  BP(mean): --  RR: 16 (07-17-24 @ 07:50) (16 - 18)  SpO2: --    Orthostatic VS  07-15-24 @ 19:54  Lying BP: --/-- HR: --  Sitting BP: 147/93 HR: 96  Standing BP: --/-- HR: --  Site: upper right arm  Mode: electronic   Vital Signs Last 24 Hrs  T(C): 36.9 (07-18-24 @ 08:57), Max: 36.9 (07-18-24 @ 08:57)  T(F): 98.5 (07-18-24 @ 08:57), Max: 98.5 (07-18-24 @ 08:57)  HR: 76 (07-18-24 @ 08:57) (76 - 86)  BP: 136/89 (07-18-24 @ 08:57) (136/89 - 147/79)  BP(mean): --  RR: 18 (07-18-24 @ 08:57) (17 - 18)  SpO2: --

## 2024-07-17 NOTE — BH DISCHARGE NOTE NURSING/SOCIAL WORK/PSYCH REHAB - PATIENT PORTAL LINK FT
You can access the FollowMyHealth Patient Portal offered by [facility] by registering at the following website: [email or web address]. By joining [facility]’s FollowMyHealth portal, you will also be able to view your health information using other applications (apps) compatible with our system.

## 2024-07-17 NOTE — BH INPATIENT PSYCHIATRY PROGRESS NOTE - NSBHASSESSSUMMFT_PSY_ALL_CORE
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  Pt states that her  wanted her to come in because she gets sad a lot and yesterday "didn't want to be here."  She states she wanted to hurt herself yesterday by stabbing herself, reports that she had a knife and cut her arm and leg with hope that she would die, denies requiring medical intervention.  She is unable to identify a specific trigger, states she felt everybody would be better off if she wasn't here, has felt this way for a while.  She states that yesterday she "disappeared" for 4 hours, was not answering her phone while her  and siblings were looking for her.  She admits that she "just drove," had thoughts of crashing her car but pulled over instead.  She admits that she also had thought of taking a whole bottle of Tylenol also.  Pt reports mood has been "sad," sleeping more than usual, anhedonia at times, feeling tired often, decreased appetite (won't eat some days, losing weight though could be attributed to gastric sleeve surgery), isolating more, denies psychotic/manic symptoms.  When asked about current/active SI, pt uncertain.  Pt gives consent for telepsych to speak with her .     Patient seen and evaluated on IPP. As per nursing report no acute events. Patient presents as depressed. States she has been feeling down for a while. States there at days when she feels down and then days when she feels fine. Verbalized difficulty with psychosocial stressors, parents medical issues, going back to school ect. Had an argument with father recently. Felt very down, too the car for a drive and though about crashing the care but didn't. Went to a friends house and her  requested she come in. Also that day took a knife and superficially cut left forearm. states none of this has ever happened before. Has never been in treatment or on any medication. Had Bariatric surgery about 8months ago. Writer discussed patients undergoing bariatric surgery are at increased risk for depression and suicide, so close follow up by psychiatry for at least a one year period would be beneficial. Writer discussed medication options, risks and benefits. Patient verbalized understanding. Patient denies any drug use and drink alcohol occasionally. Drank some alcohol that day. Denies any s/s of withdrawal, withdrawal seizures. No s/s of withdrawal noted. Team authorized to call patients  Raymond. Case discussed with Attending psychiatrist Dr. Ordonez.    #Admit    #MDD  -Lexapro 5mg daily    #Anemia  - start daily iron   - consider iron panel    #Asthma  -Albuterol PRN    -Hydroxyzine 25mg Q6 PRN for anxiety or insomnia  -Haldol 2mg Q6 PRN for agitation or psychosis  -Benadryl 25mg Q6 PRN for EPS  -Lorazepam 1mg Q6 PRN for aggression    #Agitation  -for agitation not amenable to verbal redirection, may give haldol 5 mg q6h prn, ativan 2 mg q6h prn, benadryl 50 mg q6h prn with escalation to IM if pt is a danger to self or/and others with repeat EKG to ensure QTc <500 ms.     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  Pt states that her  wanted her to come in because she gets sad a lot and yesterday "didn't want to be here."  She states she wanted to hurt herself yesterday by stabbing herself, reports that she had a knife and cut her arm and leg with hope that she would die, denies requiring medical intervention.  She is unable to identify a specific trigger, states she felt everybody would be better off if she wasn't here, has felt this way for a while.  She states that yesterday she "disappeared" for 4 hours, was not answering her phone while her  and siblings were looking for her.  She admits that she "just drove," had thoughts of crashing her car but pulled over instead.  She admits that she also had thought of taking a whole bottle of Tylenol also.  Pt reports mood has been "sad," sleeping more than usual, anhedonia at times, feeling tired often, decreased appetite (won't eat some days, losing weight though could be attributed to gastric sleeve surgery), isolating more, denies psychotic/manic symptoms.  When asked about current/active SI, pt uncertain.  Pt gives consent for telepsych to speak with her .     Patient seen and evaluated. As per nursing report no acute events. On approach patient calm and cooperative. No agitation or aggression noted. Patient states she had a good night. Denies any suicidal ideations. Able to contract for safety. Started on Lexapro yesterday. Denies any side effects/adverse reaction. No side effects/adverse reactions noted. Patient signed a 72 hour letter. Patient is voluntary. Discussed coping skills to use when discharged. Patient expressed feeling like Outpatient therapy will be beneficial. Patient also states her  is very supportive and she will go to him for help when needed. Patient encouraged to go to groups. Anticipated discharge Friday 7/19/24, provided patient continues to improve.     Spoke with the  Raymond, [phone]. Updated him on patients care and progress, anticipated discharge.  verbalized understanding and has no concerns for discharge.     #Admit    #MDD  -Lexapro 5mg daily    #Anemia  - start daily iron   - consider iron panel    #Asthma  -Albuterol PRN    -Hydroxyzine 25mg Q6 PRN for anxiety or insomnia  -Haldol 2mg Q6 PRN for agitation or psychosis  -Benadryl 25mg Q6 PRN for EPS  -Lorazepam 1mg Q6 PRN for aggression    #Agitation  -for agitation not amenable to verbal redirection, may give haldol 5 mg q6h prn, ativan 2 mg q6h prn, benadryl 50 mg q6h prn with escalation to IM if pt is a danger to self or/and others with repeat EKG to ensure QTc <500 ms.

## 2024-07-17 NOTE — BH DISCHARGE NOTE NURSING/SOCIAL WORK/PSYCH REHAB - NSDCADDINFO1FT_PSY_ALL_CORE
THIS IS AN IN-PERSON APPOINTMENT WITH [facility]  *YOU MUST ATTEND THE INITIAL INTAKE APPOINTMENT IN ORDER TO SEE YOUR PSYCHIATRIST. A MISSED INTAKE WILL CANCEL PSYCHIATRIST'S APPT AUTOMATICALLY! *

## 2024-07-17 NOTE — BH INPATIENT PSYCHIATRY PROGRESS NOTE - NSBHMETABOLIC_PSY_ALL_CORE_FT
BMI: BMI (kg/m2): 42.5 (07-15-24 @ 19:54)  HbA1c: A1C with Estimated Average Glucose Result: 6.6 % (07-16-24 @ 08:05)    Glucose: POCT Blood Glucose.: 124 mg/dL (08-26-23 @ 07:24)    BP: 132/76 (07-17-24 @ 07:50) (132/76 - 147/98)Vital Signs Last 24 Hrs  T(C): 36.8 (07-17-24 @ 07:50), Max: 36.8 (07-17-24 @ 07:50)  T(F): 98.2 (07-17-24 @ 07:50), Max: 98.2 (07-17-24 @ 07:50)  HR: 577 (07-17-24 @ 07:50) (80 - 577)  BP: 132/76 (07-17-24 @ 07:50) (132/76 - 133/85)  BP(mean): --  RR: 16 (07-17-24 @ 07:50) (16 - 18)  SpO2: --    Orthostatic VS  07-15-24 @ 19:54  Lying BP: --/-- HR: --  Sitting BP: 147/93 HR: 96  Standing BP: --/-- HR: --  Site: upper right arm  Mode: electronic    Lipid Panel: Date/Time: 07-16-24 @ 08:05  Cholesterol, Serum: 133  LDL Cholesterol Calculated: 34  HDL Cholesterol, Serum: 69  Total Cholesterol/HDL Ration Measurement: --  Triglycerides, Serum: 150   BMI: BMI (kg/m2): 42.5 (07-15-24 @ 19:54)  HbA1c: A1C with Estimated Average Glucose Result: 6.6 % (07-16-24 @ 08:05)    Glucose: POCT Blood Glucose.: 124 mg/dL (08-26-23 @ 07:24)    BP: 136/89 (07-18-24 @ 08:57) (132/76 - 147/98)Vital Signs Last 24 Hrs  T(C): 36.9 (07-18-24 @ 08:57), Max: 36.9 (07-18-24 @ 08:57)  T(F): 98.5 (07-18-24 @ 08:57), Max: 98.5 (07-18-24 @ 08:57)  HR: 76 (07-18-24 @ 08:57) (76 - 86)  BP: 136/89 (07-18-24 @ 08:57) (136/89 - 147/79)  BP(mean): --  RR: 18 (07-18-24 @ 08:57) (17 - 18)  SpO2: --      Lipid Panel: Date/Time: 07-16-24 @ 08:05  Cholesterol, Serum: 133  LDL Cholesterol Calculated: 34  HDL Cholesterol, Serum: 69  Total Cholesterol/HDL Ration Measurement: --  Triglycerides, Serum: 150

## 2024-07-17 NOTE — BH DISCHARGE NOTE NURSING/SOCIAL WORK/PSYCH REHAB - NSCDUDCCRISIS_PSY_A_CORE
[facility] Well  1 (841) [facility] ([phone])  Text "WELL" to 88277  Website: www.thinktank.net.[employer]/.National Suicide Prevention Lifeline 6 (432) 214-2064/.  [employer]  1 (857) LIFENET (210-6826)/988 Suicide and Crisis Lifeline

## 2024-07-18 PROCEDURE — 99232 SBSQ HOSP IP/OBS MODERATE 35: CPT

## 2024-07-18 RX ORDER — ALBUTEROL 90 MCG
2 AEROSOL REFILL (GRAM) INHALATION
Qty: 0 | Refills: 0 | DISCHARGE
Start: 2024-07-18

## 2024-07-18 RX ORDER — ESCITALOPRAM OXALATE 20 MG/1
1 TABLET, FILM COATED ORAL
Qty: 30 | Refills: 0
Start: 2024-07-18 | End: 2024-08-16

## 2024-07-18 RX ORDER — FERROUS SULFATE 325(65) MG
1 TABLET ORAL
Qty: 14 | Refills: 0
Start: 2024-07-18 | End: 2024-07-31

## 2024-07-18 RX ADMIN — Medication 325 MILLIGRAM(S): at 08:33

## 2024-07-18 RX ADMIN — ESCITALOPRAM OXALATE 5 MILLIGRAM(S): 20 TABLET, FILM COATED ORAL at 08:33

## 2024-07-18 NOTE — BH INPATIENT PSYCHIATRY DISCHARGE NOTE - NSBHDCMEDICALFT_PSY_A_CORE
Asthma: prescribed Albuterol PRN. Patient to follow up with PCP regarding labs.  -B12 185  -TSH 4.30  -A1c 6.6  -HGB 8.1

## 2024-07-18 NOTE — BH INPATIENT PSYCHIATRY PROGRESS NOTE - NSBHMETABOLIC_PSY_ALL_CORE_FT
BMI: BMI (kg/m2): 42.5 (07-15-24 @ 19:54)  HbA1c: A1C with Estimated Average Glucose Result: 6.6 % (07-16-24 @ 08:05)    Glucose: POCT Blood Glucose.: 124 mg/dL (08-26-23 @ 07:24)    BP: 136/89 (07-18-24 @ 08:57) (132/76 - 147/98)Vital Signs Last 24 Hrs  T(C): 36.9 (07-18-24 @ 08:57), Max: 36.9 (07-18-24 @ 08:57)  T(F): 98.5 (07-18-24 @ 08:57), Max: 98.5 (07-18-24 @ 08:57)  HR: 76 (07-18-24 @ 08:57) (76 - 86)  BP: 136/89 (07-18-24 @ 08:57) (136/89 - 147/79)  BP(mean): --  RR: 18 (07-18-24 @ 08:57) (17 - 18)  SpO2: --      Lipid Panel: Date/Time: 07-16-24 @ 08:05  Cholesterol, Serum: 133  LDL Cholesterol Calculated: 34  HDL Cholesterol, Serum: 69  Total Cholesterol/HDL Ration Measurement: --  Triglycerides, Serum: 150   BMI: BMI (kg/m2): 42.5 (07-15-24 @ 19:54)  HbA1c: A1C with Estimated Average Glucose Result: 6.6 % (07-16-24 @ 08:05)    Glucose: POCT Blood Glucose.: 124 mg/dL (08-26-23 @ 07:24)    BP: 143/85 (07-18-24 @ 15:27) (132/76 - 147/79)Vital Signs Last 24 Hrs  T(C): 36.2 (07-18-24 @ 15:27), Max: 36.9 (07-18-24 @ 08:57)  T(F): 97.1 (07-18-24 @ 15:27), Max: 98.5 (07-18-24 @ 08:57)  HR: 103 (07-18-24 @ 15:27) (76 - 103)  BP: 143/85 (07-18-24 @ 15:27) (136/89 - 147/79)  BP(mean): --  RR: 16 (07-18-24 @ 15:27) (16 - 18)  SpO2: --      Lipid Panel: Date/Time: 07-16-24 @ 08:05  Cholesterol, Serum: 133  LDL Cholesterol Calculated: 34  HDL Cholesterol, Serum: 69  Total Cholesterol/HDL Ration Measurement: --  Triglycerides, Serum: 150

## 2024-07-18 NOTE — BH TREATMENT PLAN - NSTXDEPRESINTERMD_PSY_ALL_CORE
Encounter addended by: Roslyn Davidson on: 8/15/2019 12:38 PM   Actions taken: Charge Capture section accepted, Visit Navigator Flowsheet section accepted
Medication management and milieu therapy

## 2024-07-18 NOTE — BH INPATIENT PSYCHIATRY DISCHARGE NOTE - NSBHFUPINTERVALHXFT_PSY_A_CORE
DISCHARGE
Patient seen and evaluated 7/18/24. As per nursing report no acute events. On approach patient calm and cooperative. No agitation or aggression noted. Patient expresses an overall improved mood since admission. Denies any suicidal/ homicidal ideations. Able to contract for safety. Patients insight and judgement have improved. Anticipated discharge Tomorrow 7/19/24. Compliant with medication. Does not warrant continued Inpatient hospitalization. Patient does not present a risk to self or others at this time.     PA student spoke with the  Raymond, [phone]. Updated him on patients care and progress, anticipated discharge tomorrow.  verbalized understanding and has no concerns for discharge. He we pick patient up tomorrow.

## 2024-07-18 NOTE — BH INPATIENT PSYCHIATRY DISCHARGE NOTE - NSDCCPCAREPLAN_GEN_ALL_CORE_FT
PRINCIPAL DISCHARGE DIAGNOSIS  Diagnosis: Major depression, recurrent  Assessment and Plan of Treatment: Patient to continue with prescribed medication and follow up with outpatient

## 2024-07-18 NOTE — BH INPATIENT PSYCHIATRY DISCHARGE NOTE - NSBHASSESSSUMMFT_PSY_ALL_CORE
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  Pt states that her  wanted her to come in because she gets sad a lot and yesterday "didn't want to be here."  She states she wanted to hurt herself yesterday by stabbing herself, reports that she had a knife and cut her arm and leg with hope that she would die, denies requiring medical intervention.  She is unable to identify a specific trigger, states she felt everybody would be better off if she wasn't here, has felt this way for a while.  She states that yesterday she "disappeared" for 4 hours, was not answering her phone while her  and siblings were looking for her.  She admits that she "just drove," had thoughts of crashing her car but pulled over instead.  She admits that she also had thought of taking a whole bottle of Tylenol also.  Pt reports mood has been "sad," sleeping more than usual, anhedonia at times, feeling tired often, decreased appetite (won't eat some days, losing weight though could be attributed to gastric sleeve surgery), isolating more, denies psychotic/manic symptoms.  When asked about current/active SI, pt uncertain.  Pt gives consent for telepsych to speak with her .     Patient seen and evaluated. As per nursing report no acute events. On approach patient calm and cooperative. No agitation or aggression noted. Patient expresses an overall improved mood since admission. Denies any suicidal/ homicidal ideations. Able to contract for safety. Patients insight and judgement have improved. Anticipated discharge Tomorrow 7/19/24. Compliant with medication. Does not warrant continued Inpatient hospitalization. Patient does not present a risk to self or others at this time.     PA student spoke with the  Raymond, [phone]. Updated him on patients care and progress, anticipated discharge tomorrow.  verbalized understanding and has no concerns for discharge. He we pick patient up tomorrow.     #Admit    #MDD  -Lexapro 5mg daily    #Anemia  - daily iron       #Asthma  -Albuterol PRN

## 2024-07-18 NOTE — BH INPATIENT PSYCHIATRY DISCHARGE NOTE - HPI (INCLUDE ILLNESS QUALITY, SEVERITY, DURATION, TIMING, CONTEXT, MODIFYING FACTORS, ASSOCIATED SIGNS AND SYMPTOMS)
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  Pt states that her  wanted her to come in because she gets sad a lot and yesterday "didn't want to be here."  She states she wanted to hurt herself yesterday by stabbing herself, reports that she had a knife and cut her arm and leg with hope that she would die, denies requiring medical intervention.  She is unable to identify a specific trigger, states she felt everybody would be better off if she wasn't here, has felt this way for a while.  She states that yesterday she "disappeared" for 4 hours, was not answering her phone while her  and siblings were looking for her.  She admits that she "just drove," had thoughts of crashing her car but pulled over instead.  She admits that she also had thought of taking a whole bottle of Tylenol also.  Pt reports mood has been "sad," sleeping more than usual, anhedonia at times, feeling tired often, decreased appetite (won't eat some days, losing weight though could be attributed to gastric sleeve surgery), isolating more, denies psychotic/manic symptoms.  When asked about current/active SI, pt uncertain.  Pt gives consent for telepsych to speak with her .     Patient seen and evaluated 7/18/24. As per nursing report no acute events. On approach patient calm and cooperative. No agitation or aggression noted. Patient expresses an overall improved mood since admission. Denies any suicidal/ homicidal ideations. Able to contract for safety. Patients insight and judgement have improved. Anticipated discharge Tomorrow 7/19/24. Compliant with medication. Does not warrant continued Inpatient hospitalization. Patient does not present a risk to self or others at this time.     PA student spoke with the  Raymond, [phone]. Updated him on patients care and progress, anticipated discharge tomorrow.  verbalized understanding and has no concerns for discharge. He we pick patient up tomorrow.

## 2024-07-18 NOTE — BH CHART NOTE - NSEVENTNOTEFT_PSY_ALL_CORE
Spoke with  Raymond ([phone]) updated him on patients care and progress. Pt will be discharged tomorrow July 19th at 10:30 and picked up by .

## 2024-07-18 NOTE — BH INPATIENT PSYCHIATRY DISCHARGE NOTE - NSBHMETABOLIC_PSY_ALL_CORE_FT
BMI: BMI (kg/m2): 42.5 (07-15-24 @ 19:54)  HbA1c: A1C with Estimated Average Glucose Result: 6.6 % (07-16-24 @ 08:05)    Glucose: POCT Blood Glucose.: 124 mg/dL (08-26-23 @ 07:24)    BP: 136/89 (07-18-24 @ 08:57) (132/76 - 147/79)Vital Signs Last 24 Hrs  T(C): 36.9 (07-18-24 @ 08:57), Max: 36.9 (07-18-24 @ 08:57)  T(F): 98.5 (07-18-24 @ 08:57), Max: 98.5 (07-18-24 @ 08:57)  HR: 76 (07-18-24 @ 08:57) (76 - 86)  BP: 136/89 (07-18-24 @ 08:57) (136/89 - 147/79)  BP(mean): --  RR: 18 (07-18-24 @ 08:57) (17 - 18)  SpO2: --      Lipid Panel: Date/Time: 07-16-24 @ 08:05  Cholesterol, Serum: 133  LDL Cholesterol Calculated: 34  HDL Cholesterol, Serum: 69  Total Cholesterol/HDL Ration Measurement: --  Triglycerides, Serum: 150

## 2024-07-18 NOTE — BH INPATIENT PSYCHIATRY PROGRESS NOTE - NSBHFUPINTERVALHXFT_PSY_A_CORE
Patient seen and evaluated. As per nursing report no acute events. On approach patient calm and cooperative. No agitation or aggression noted. Patient expresses an overall improved mood since admission. Denies any suicidal/ homicidal ideations. Able to contract for safety. Patients insight and judgement have improved. Anticipated discharge Tomorrow 7/19/24. Compliant with medication. Does not warrant continued Inpatient hospitalization. Patient does not present a risk to self or others at this time.     PA student spoke with the  Raymond, [phone]. Updated him on patients care and progress, anticipated discharge tomorrow.  verbalized understanding and has no concerns for discharge. He we pick patient up tomorrow.  Patient seen and evaluated. As per nursing report no acute events. On approach patient calm and cooperative. No agitation or aggression noted. Patient expresses an overall improved mood since admission. Denies any suicidal/ homicidal ideations. Able to contract for safety. Patients insight and judgement have improved. Anticipated discharge Tomorrow 7/19/24. Compliant with medication. Does not warrant continued Inpatient hospitalization. Patient does not present a risk to self or others at this time.     PA student spoke with the  Raymond, [phone]. Updated him on patients care and progress, anticipated discharge tomorrow.  verbalized understanding and has no concerns for discharge. He we pick patient up tomorrow.     Patient to follow up with PCP regarding labs.  -B12 185  -TSH 4.30  -A1c 6.6  -HGB 8.1

## 2024-07-18 NOTE — BH INPATIENT PSYCHIATRY DISCHARGE NOTE - NSDCMRMEDTOKEN_GEN_ALL_CORE_FT
albuterol 90 mcg/inh inhalation aerosol: 2 puff(s) inhaled every 6 hours as needed for Shortness of Breath and/or Wheezing Continue until told otherwise by your provider.  escitalopram 5 mg oral tablet: 1 tab(s) orally once a day x 30 days Continue until told otherwise by your provider.  ferrous sulfate 325 mg (65 mg elemental iron) oral tablet: 1 tab(s) orally once a day x 14 days Continue until told otherwise by your provider.

## 2024-07-18 NOTE — BH TREATMENT PLAN - NSTXPROBDCOPLK_PSY_ALL_CORE
Quality 226: Preventive Care And Screening: Tobacco Use: Screening And Cessation Intervention: Tobacco Screening not Performed Quality 431: Preventive Care And Screening: Unhealthy Alcohol Use - Screening: Patient not identified as an unhealthy alcohol user when screened for unhealthy alcohol use using a systematic screening method Quality 130: Documentation Of Current Medications In The Medical Record: Current Medications Documented Detail Level: Generalized DISCHARGE ISSUE - LACK OF APPROPRIATE OUTPATIENT SERVICES

## 2024-07-18 NOTE — BH INPATIENT PSYCHIATRY PROGRESS NOTE - NSBHASSESSSUMMFT_PSY_ALL_CORE
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  Pt states that her  wanted her to come in because she gets sad a lot and yesterday "didn't want to be here."  She states she wanted to hurt herself yesterday by stabbing herself, reports that she had a knife and cut her arm and leg with hope that she would die, denies requiring medical intervention.  She is unable to identify a specific trigger, states she felt everybody would be better off if she wasn't here, has felt this way for a while.  She states that yesterday she "disappeared" for 4 hours, was not answering her phone while her  and siblings were looking for her.  She admits that she "just drove," had thoughts of crashing her car but pulled over instead.  She admits that she also had thought of taking a whole bottle of Tylenol also.  Pt reports mood has been "sad," sleeping more than usual, anhedonia at times, feeling tired often, decreased appetite (won't eat some days, losing weight though could be attributed to gastric sleeve surgery), isolating more, denies psychotic/manic symptoms.  When asked about current/active SI, pt uncertain.  Pt gives consent for telepsych to speak with her .     Patient seen and evaluated. As per nursing report no acute events. On approach patient calm and cooperative. No agitation or aggression noted. Patient expresses an overall improved mood since admission. Denies any suicidal/ homicidal ideations. Able to contract for safety. Patients insight and judgement have improved. Anticipated discharge Tomorrow 7/19/24. Compliant with medication. Does not warrant continued Inpatient hospitalization. Patient does not present a risk to self or others at this time.     PA student spoke with the  Raymond, [phone]. Updated him on patients care and progress, anticipated discharge tomorrow.  verbalized understanding and has no concerns for discharge. He we pick patient up tomorrow.     #Admit    #MDD  -Lexapro 5mg daily    #Anemia  - start daily iron   - consider iron panel    #Asthma  -Albuterol PRN    -Hydroxyzine 25mg Q6 PRN for anxiety or insomnia  -Haldol 2mg Q6 PRN for agitation or psychosis  -Benadryl 25mg Q6 PRN for EPS  -Lorazepam 1mg Q6 PRN for aggression    #Agitation  -for agitation not amenable to verbal redirection, may give haldol 5 mg q6h prn, ativan 2 mg q6h prn, benadryl 50 mg q6h prn with escalation to IM if pt is a danger to self or/and others with repeat EKG to ensure QTc <500 ms.     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  Pt states that her  wanted her to come in because she gets sad a lot and yesterday "didn't want to be here."  She states she wanted to hurt herself yesterday by stabbing herself, reports that she had a knife and cut her arm and leg with hope that she would die, denies requiring medical intervention.  She is unable to identify a specific trigger, states she felt everybody would be better off if she wasn't here, has felt this way for a while.  She states that yesterday she "disappeared" for 4 hours, was not answering her phone while her  and siblings were looking for her.  She admits that she "just drove," had thoughts of crashing her car but pulled over instead.  She admits that she also had thought of taking a whole bottle of Tylenol also.  Pt reports mood has been "sad," sleeping more than usual, anhedonia at times, feeling tired often, decreased appetite (won't eat some days, losing weight though could be attributed to gastric sleeve surgery), isolating more, denies psychotic/manic symptoms.  When asked about current/active SI, pt uncertain.  Pt gives consent for telepsych to speak with her .     Patient seen and evaluated. As per nursing report no acute events. On approach patient calm and cooperative. No agitation or aggression noted. Patient expresses an overall improved mood since admission. Denies any suicidal/ homicidal ideations. Able to contract for safety. Patients insight and judgement have improved. Anticipated discharge Tomorrow 7/19/24. Compliant with medication. Does not warrant continued Inpatient hospitalization. Patient does not present a risk to self or others at this time.     PA student spoke with the  Raymond, [phone]. Updated him on patients care and progress, anticipated discharge tomorrow.  verbalized understanding and has no concerns for discharge. He we pick patient up tomorrow.     Patient to follow up with PCP regarding labs.  -B12 185  -TSH 4.30  -A1c 6.6  -HGB 8.1    #Admit    #MDD  -Lexapro 5mg daily    #Anemia  - start daily iron   - consider iron panel    #Asthma  -Albuterol PRN    -Hydroxyzine 25mg Q6 PRN for anxiety or insomnia  -Haldol 2mg Q6 PRN for agitation or psychosis  -Benadryl 25mg Q6 PRN for EPS  -Lorazepam 1mg Q6 PRN for aggression    #Agitation  -for agitation not amenable to verbal redirection, may give haldol 5 mg q6h prn, ativan 2 mg q6h prn, benadryl 50 mg q6h prn with escalation to IM if pt is a danger to self or/and others with repeat EKG to ensure QTc <500 ms.

## 2024-07-18 NOTE — BH INPATIENT PSYCHIATRY PROGRESS NOTE - NSBHCHARTREVIEWVS_PSY_A_CORE FT
Vital Signs Last 24 Hrs  T(C): 36.9 (07-18-24 @ 08:57), Max: 36.9 (07-18-24 @ 08:57)  T(F): 98.5 (07-18-24 @ 08:57), Max: 98.5 (07-18-24 @ 08:57)  HR: 76 (07-18-24 @ 08:57) (76 - 86)  BP: 136/89 (07-18-24 @ 08:57) (136/89 - 147/79)  BP(mean): --  RR: 18 (07-18-24 @ 08:57) (17 - 18)  SpO2: --     Vital Signs Last 24 Hrs  T(C): 36.2 (07-18-24 @ 15:27), Max: 36.9 (07-18-24 @ 08:57)  T(F): 97.1 (07-18-24 @ 15:27), Max: 98.5 (07-18-24 @ 08:57)  HR: 103 (07-18-24 @ 15:27) (76 - 103)  BP: 143/85 (07-18-24 @ 15:27) (136/89 - 147/79)  BP(mean): --  RR: 16 (07-18-24 @ 15:27) (16 - 18)  SpO2: --
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
## 2024-07-18 NOTE — BH TREATMENT PLAN - NSTXPLANTHERAPYSESSIONSFT_PSY_ALL_CORE
07-16-24  Type of therapy: Coping skills, Creative arts therapy, Inspiration and motiviation, Leisure development, Self esteem, Stress management, Symptom management  Type of session: Individual  Level of patient participation: Quiet, Resistance to participation  Duration of participation: Less than 15 minutes  Therapy conducted by: Psych rehab  Therapy Summary: Pt will need increased encouragement to attend therapy sessions

## 2024-07-19 VITALS
RESPIRATION RATE: 18 BRPM | SYSTOLIC BLOOD PRESSURE: 113 MMHG | DIASTOLIC BLOOD PRESSURE: 74 MMHG | HEART RATE: 966 BPM | TEMPERATURE: 97 F

## 2024-07-19 PROCEDURE — 99238 HOSP IP/OBS DSCHRG MGMT 30/<: CPT

## 2024-07-19 RX ADMIN — Medication 325 MILLIGRAM(S): at 09:42

## 2024-07-19 RX ADMIN — ESCITALOPRAM OXALATE 5 MILLIGRAM(S): 20 TABLET, FILM COATED ORAL at 10:00

## 2024-07-19 RX ADMIN — Medication 2 PUFF(S): at 02:47

## 2024-07-19 NOTE — BH INPATIENT PSYCHIATRY PROGRESS NOTE - NSBHASSESSSUMMFT_PSY_ALL_CORE
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  Pt states that her  wanted her to come in because she gets sad a lot and yesterday "didn't want to be here."  She states she wanted to hurt herself yesterday by stabbing herself, reports that she had a knife and cut her arm and leg with hope that she would die, denies requiring medical intervention.  She is unable to identify a specific trigger, states she felt everybody would be better off if she wasn't here, has felt this way for a while.  She states that yesterday she "disappeared" for 4 hours, was not answering her phone while her  and siblings were looking for her.  She admits that she "just drove," had thoughts of crashing her car but pulled over instead.  She admits that she also had thought of taking a whole bottle of Tylenol also.  Pt reports mood has been "sad," sleeping more than usual, anhedonia at times, feeling tired often, decreased appetite (won't eat some days, losing weight though could be attributed to gastric sleeve surgery), isolating more, denies psychotic/manic symptoms.  When asked about current/active SI, pt uncertain.  Pt gives consent for telepsych to speak with her .     D/C today. Patients  picking patient up. Meds sent to [facility] pharmacy as requested by patient. Patient appeared to be in good spirits today. Endorses a better mood. Insight and judgment have improved. Denies suicidal/ homicidal ideations. Patient able to contract for safety. Denies any A/V hallucinations. Compliant with medication. Does not warrant continued Inpatient hospitalization. Writer, PA student reviewed D/C papers with patient. Patient verbalized understanding. Patient does not appear to be of risk to self or others at this time.-B12 185    #Admit    #MDD  -Lexapro 5mg daily    #Anemia  - start daily iron   - consider iron panel    #Asthma  -Albuterol PRN    -Hydroxyzine 25mg Q6 PRN for anxiety or insomnia  -Haldol 2mg Q6 PRN for agitation or psychosis  -Benadryl 25mg Q6 PRN for EPS  -Lorazepam 1mg Q6 PRN for aggression    #Agitation  -for agitation not amenable to verbal redirection, may give haldol 5 mg q6h prn, ativan 2 mg q6h prn, benadryl 50 mg q6h prn with escalation to IM if pt is a danger to self or/and others with repeat EKG to ensure QTc <500 ms.

## 2024-07-19 NOTE — BH INPATIENT PSYCHIATRY PROGRESS NOTE - NSBHMETABOLIC_PSY_ALL_CORE_FT
BMI: BMI (kg/m2): 42.5 (07-15-24 @ 19:54)  HbA1c: A1C with Estimated Average Glucose Result: 6.6 % (07-16-24 @ 08:05)    Glucose: POCT Blood Glucose.: 124 mg/dL (08-26-23 @ 07:24)    BP: 113/74 (07-19-24 @ 08:20) (113/74 - 147/79)Vital Signs Last 24 Hrs  T(C): 35.9 (07-19-24 @ 08:20), Max: 36.2 (07-18-24 @ 15:27)  T(F): 96.6 (07-19-24 @ 08:20), Max: 97.1 (07-18-24 @ 15:27)  HR: 966 (07-19-24 @ 08:20) (103 - 966)  BP: 113/74 (07-19-24 @ 08:20) (113/74 - 143/85)  BP(mean): --  RR: 18 (07-19-24 @ 08:20) (16 - 18)  SpO2: --      Lipid Panel: Date/Time: 07-16-24 @ 08:05  Cholesterol, Serum: 133  LDL Cholesterol Calculated: 34  HDL Cholesterol, Serum: 69  Total Cholesterol/HDL Ration Measurement: --  Triglycerides, Serum: 150

## 2024-07-19 NOTE — BH INPATIENT PSYCHIATRY PROGRESS NOTE - NSBHATTESTBILLING_PSY_A_CORE
40909-Lweyirjrvrt diagnostic evaluation with medical services
09024-Lmcxtnuyunj diagnostic evaluation with medical services
88447-Chxftjyjbuh diagnostic evaluation with medical services

## 2024-07-19 NOTE — BH INPATIENT PSYCHIATRY PROGRESS NOTE - NSBHATTESTTYPEVISIT_PSY_A_CORE
On-site Attending supervising NADEEM (99XXX codes)

## 2024-07-19 NOTE — BH INPATIENT PSYCHIATRY PROGRESS NOTE - PRN MEDS
MEDICATIONS  (PRN):  albuterol    90 MICROgram(s) HFA Inhaler 2 Puff(s) Inhalation every 6 hours PRN Shortness of Breath and/or Wheezing  diphenhydrAMINE 25 milliGRAM(s) Oral every 6 hours PRN EPS  haloperidol     Tablet 2 milliGRAM(s) Oral every 6 hours PRN Agitation  hydrOXYzine hydrochloride 25 milliGRAM(s) Oral every 6 hours PRN Anxiety and or insomnia  LORazepam     Tablet 1 milliGRAM(s) Oral every 6 hours PRN Aggression  

## 2024-07-19 NOTE — BH INPATIENT PSYCHIATRY PROGRESS NOTE - NSBHFUPINTERVALHXFT_PSY_A_CORE
D/C today. Patients  picking patient up. Meds sent to [facility] pharmacy as requested by patient. Patient appeared to be in good spirits today. Endorses a better mood. Insight and judgment have improved. Denies suicidal/ homicidal ideations. Patient able to contract for safety. Denies any A/V hallucinations. Compliant with medication. Does not warrant continued Inpatient hospitalization. Writer, PA student reviewed D/C papers with patient. Patient verbalized understanding. Patient does not appear to be of risk to self or others at this time.

## 2024-07-19 NOTE — BH INPATIENT PSYCHIATRY PROGRESS NOTE - NSBHCHARTREVIEWVS_PSY_A_CORE FT
Vital Signs Last 24 Hrs  T(C): 35.9 (07-19-24 @ 08:20), Max: 36.2 (07-18-24 @ 15:27)  T(F): 96.6 (07-19-24 @ 08:20), Max: 97.1 (07-18-24 @ 15:27)  HR: 966 (07-19-24 @ 08:20) (103 - 966)  BP: 113/74 (07-19-24 @ 08:20) (113/74 - 143/85)  BP(mean): --  RR: 18 (07-19-24 @ 08:20) (16 - 18)  SpO2: --

## 2024-07-19 NOTE — BH INPATIENT PSYCHIATRY PROGRESS NOTE - CURRENT MEDICATION
MEDICATIONS  (STANDING):  escitalopram 5 milliGRAM(s) Oral daily  ferrous    sulfate 325 milliGRAM(s) Oral daily    MEDICATIONS  (PRN):  albuterol    90 MICROgram(s) HFA Inhaler 2 Puff(s) Inhalation every 6 hours PRN Shortness of Breath and/or Wheezing  diphenhydrAMINE 25 milliGRAM(s) Oral every 6 hours PRN EPS  haloperidol     Tablet 2 milliGRAM(s) Oral every 6 hours PRN Agitation  hydrOXYzine hydrochloride 25 milliGRAM(s) Oral every 6 hours PRN Anxiety and or insomnia  LORazepam     Tablet 1 milliGRAM(s) Oral every 6 hours PRN Aggression  

## 2024-07-19 NOTE — BH INPATIENT PSYCHIATRY PROGRESS NOTE - NSICDXBHPRIMARYDX_PSY_ALL_CORE
Major depression, recurrent   F33.9  

## 2024-07-19 NOTE — BH INPATIENT PSYCHIATRY PROGRESS NOTE - NSDCCRITERIA_PSY_ALL_CORE
- - -
Patient to be discharged once deemed not a danger to self or others.

## 2024-07-19 NOTE — BH CHART NOTE - NSDETAILSOTHERINTERV_PSY_ALL_CORE
Routine observation on the unit. Patient denies any suicidal ideations. Able to contract for safety. Routine observation while on unit. Patient to be discharged today. Denies suicidal/ homicidal ideations. Patient able to contract for safety. Does not warrant continued Inpatient hospitalization. Patient does not appear to be of risk to self or others at this time.

## 2024-07-19 NOTE — BH INPATIENT PSYCHIATRY PROGRESS NOTE - NSTXDEPRESINTERMD_PSY_ALL_CORE
Medication management and milieu therapy

## 2024-07-19 NOTE — BH CHART NOTE - DETAILS
Repeat assessment. Patient denies any suicidal ideations. Able to contract for safety. Repeat assessment done for time period of hospitalization. Denies suicidal/ homicidal ideations. Patient able to contract for safety. Compliant with medication. Does not warrant continued Inpatient hospitalization. Patient does not appear to be of risk to self or others at this time.

## 2024-07-24 ENCOUNTER — APPOINTMENT (OUTPATIENT)
Dept: PSYCHIATRY | Facility: CLINIC | Age: 34
End: 2024-07-24

## 2024-07-24 DIAGNOSIS — Z82.3 FAMILY HISTORY OF STROKE: ICD-10-CM

## 2024-07-24 DIAGNOSIS — F32.A ANXIETY DISORDER, UNSPECIFIED: ICD-10-CM

## 2024-07-24 DIAGNOSIS — Z83.3 FAMILY HISTORY OF DIABETES MELLITUS: ICD-10-CM

## 2024-07-24 DIAGNOSIS — Z82.49 FAMILY HISTORY OF ISCHEMIC HEART DISEASE AND OTHER DISEASES OF THE CIRCULATORY SYSTEM: ICD-10-CM

## 2024-07-24 DIAGNOSIS — F41.9 ANXIETY DISORDER, UNSPECIFIED: ICD-10-CM

## 2024-07-25 DIAGNOSIS — R45.851 SUICIDAL IDEATIONS: ICD-10-CM

## 2024-07-25 DIAGNOSIS — R45.1 RESTLESSNESS AND AGITATION: ICD-10-CM

## 2024-07-25 DIAGNOSIS — Z88.0 ALLERGY STATUS TO PENICILLIN: ICD-10-CM

## 2024-07-25 DIAGNOSIS — D50.8 OTHER IRON DEFICIENCY ANEMIAS: ICD-10-CM

## 2024-07-25 DIAGNOSIS — S60.812A ABRASION OF LEFT WRIST, INITIAL ENCOUNTER: ICD-10-CM

## 2024-07-25 DIAGNOSIS — F33.9 MAJOR DEPRESSIVE DISORDER, RECURRENT, UNSPECIFIED: ICD-10-CM

## 2024-07-25 DIAGNOSIS — K21.9 GASTRO-ESOPHAGEAL REFLUX DISEASE WITHOUT ESOPHAGITIS: ICD-10-CM

## 2024-07-25 DIAGNOSIS — E66.9 OBESITY, UNSPECIFIED: ICD-10-CM
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
## 2024-07-25 DIAGNOSIS — J45.909 UNSPECIFIED ASTHMA, UNCOMPLICATED: ICD-10-CM
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
## 2024-07-25 DIAGNOSIS — Z98.84 BARIATRIC SURGERY STATUS: ICD-10-CM
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
## 2024-07-26 NOTE — SOCIAL HISTORY
[FreeTextEntry1] : Employment hx: Patient works as an  and is looking to attend school to be .   developmental hx: No IDD  social supports, meaningful activities: Patient enjoys cooking, listening to music, and spending time with her family.  (Race/ethnicity, sexual identity, spirituality/Sikhism): The patient is white, heterosexual, and not Jew.  (Spiritual Assessment Tool - [employer]ICA   F. What is your joss or belief?   Patient is not Jew or spiritual.  Do you consider yourself spiritual or Jew?  No  Is there something you believe in that gives meaning to your life?  No  I: Is it important in your life?  No  What influence does it have on how you take care of yourself?  No spiritual or Jew considerations.   How have your beliefs influenced your behavior during this illness? What role do your beliefs play in regaining your health?  Patient did not speak on beliefs that influence illness.   C. Are you part of a spiritual or Jew community?  No  Is this of support to you and how?  No  Is there a person or group of people you really love or who are really important to you?  Patient loves her family and this is a protective factor.   H. We have been discussing your belief and supports. What else gives you internal support? Patient has internal support based on her own skills.  What are your sources of hope, strength, comfort and peace?  Patient finds comfort in spending time with loved ones.  What do you hold on to during difficult times? Patient holds onto family during difficult times.

## 2024-07-26 NOTE — REASON FOR VISIT
[Number can be texted] : number can be texted [OK  to leave message] : OK  to leave message [Psychiatric Inpatient] : Psychiatric Inpatient [[facility] Provider/Facility] : [facility] Provider/Facility [Patient] : Patient [Prior Medical Records] : Prior Medical Records [FreeTextEntry5] : English [FreeTextEntry6] : Camelia [FreeTextEntry7] : she/her [FreeTextEntry2] : Patient was hospitalized for major depressive episode and suicidal attempt by cutting. The patient was referred for outpatient care to continue treatment.  [FreeTextEntry1] : To alleviate depression and gain coping skills.

## 2024-07-26 NOTE — PHYSICAL EXAM
[Cooperative] : cooperative [Depressed] : depressed [Constricted] : constricted [Clear] : clear [Linear/Goal Directed] : linear/goal directed [Average] : average [WNL] : within normal limits [FreeTextEntry8] : Patient appeared to be depressed during interview.  [de-identified] : Patient presents with constricted affect.

## 2024-07-26 NOTE — END OF VISIT
[Time Spent: ___ minutes] : I have spent [unfilled] minutes of time on the encounter. [Licensed Clinician] : Licensed Clinician [FreeTextEntry1] : Jose Armando Santana LMSW

## 2024-07-26 NOTE — REASON FOR VISIT
[Number can be texted] : number can be texted [OK  to leave message] : OK  to leave message [Psychiatric Inpatient] : Psychiatric Inpatient [[facility] Provider/Facility] : [facility] Provider/Facility [Patient] : Patient [Prior Medical Records] : Prior Medical Records [FreeTextEntry5] : English [FreeTextEntry6] : Camelia [FreeTextEntry7] : she/her [FreeTextEntry2] : Patient was hospitalized for major depressive episode and suicidal attempt by cutting. The patient was referred for outpatient care to continue treatment.  [FreeTextEntry1] : To alleviate depression and gain coping skills.
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
## 2024-07-26 NOTE — PHYSICAL EXAM
[Cooperative] : cooperative [Depressed] : depressed [Constricted] : constricted [Clear] : clear [Linear/Goal Directed] : linear/goal directed [Average] : average [WNL] : within normal limits [FreeTextEntry8] : Patient appeared to be depressed during interview.  [de-identified] : Patient presents with constricted affect.
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
## 2024-07-26 NOTE — SOCIAL HISTORY
[FreeTextEntry1] : Employment hx: Patient works as an  and is looking to attend school to be .   developmental hx: No IDD  social supports, meaningful activities: Patient enjoys cooking, listening to music, and spending time with her family.  (Race/ethnicity, sexual identity, spirituality/Restorationist): The patient is white, heterosexual, and not Nondenominational.  (Spiritual Assessment Tool - [employer]   F. What is your joss or belief?   Patient is not Nondenominational or spiritual.  Do you consider yourself spiritual or Nondenominational?  No  Is there something you believe in that gives meaning to your life?  No  I: Is it important in your life?  No  What influence does it have on how you take care of yourself?  No spiritual or Nondenominational considerations.   How have your beliefs influenced your behavior during this illness? What role do your beliefs play in regaining your health?  Patient did not speak on beliefs that influence illness.   C. Are you part of a spiritual or Nondenominational community?  No  Is this of support to you and how?  No  Is there a person or group of people you really love or who are really important to you?  Patient loves her family and this is a protective factor.   H. We have been discussing your belief and supports. What else gives you internal support? Patient has internal support based on her own skills.  What are your sources of hope, strength, comfort and peace?  Patient finds comfort in spending time with loved ones.  What do you hold on to during difficult times? Patient holds onto family during difficult times.

## 2024-07-31 ENCOUNTER — NON-APPOINTMENT (OUTPATIENT)
Age: 34
End: 2024-07-31

## 2024-08-09 ENCOUNTER — OUTPATIENT (OUTPATIENT)
Dept: OUTPATIENT SERVICES | Facility: HOSPITAL | Age: 34
LOS: 1 days | End: 2024-08-09
Payer: MEDICAID

## 2024-08-09 ENCOUNTER — APPOINTMENT (OUTPATIENT)
Dept: PSYCHIATRY | Facility: CLINIC | Age: 34
End: 2024-08-09

## 2024-08-09 DIAGNOSIS — F41.1 GENERALIZED ANXIETY DISORDER: ICD-10-CM

## 2024-08-09 DIAGNOSIS — F32.A DEPRESSION, UNSPECIFIED: ICD-10-CM

## 2024-08-09 DIAGNOSIS — Z90.3 ACQUIRED ABSENCE OF STOMACH [PART OF]: Chronic | ICD-10-CM

## 2024-08-09 PROCEDURE — 90792 PSYCH DIAG EVAL W/MED SRVCS: CPT

## 2024-08-09 PROCEDURE — 90792 PSYCH DIAG EVAL W/MED SRVCS: CPT | Mod: 95

## 2024-08-09 NOTE — SOCIAL HISTORY
[FreeTextEntry1] : Employment hx: Patient works as an  and is looking to attend school to be .   developmental hx: No IDD  social supports, meaningful activities: Patient enjoys cooking, listening to music, and spending time with her family.  (Race/ethnicity, sexual identity, spirituality/Shinto): The patient is white, heterosexual, and not Jainism.  (Spiritual Assessment Tool - [employer]   F. What is your joss or belief?   Patient is not Jainism or spiritual.  Do you consider yourself spiritual or Jainism?  No  Is there something you believe in that gives meaning to your life?  No  I: Is it important in your life?  No  What influence does it have on how you take care of yourself?  No spiritual or Jainism considerations.   How have your beliefs influenced your behavior during this illness? What role do your beliefs play in regaining your health?  Patient did not speak on beliefs that influence illness.   C. Are you part of a spiritual or Jainism community?  No  Is this of support to you and how?  No  Is there a person or group of people you really love or who are really important to you?  Patient loves her family and this is a protective factor.   H. We have been discussing your belief and supports. What else gives you internal support? Patient has internal support based on her own skills.  What are your sources of hope, strength, comfort and peace?  Patient finds comfort in spending time with loved ones.  What do you hold on to during difficult times? Patient holds onto family during difficult times.
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
## 2024-08-09 NOTE — PHYSICAL EXAM
[Cooperative] : cooperative [Depressed] : depressed [Constricted] : constricted [Clear] : clear [Linear/Goal Directed] : linear/goal directed [Average] : average [WNL] : within normal limits [FreeTextEntry8] : Patient appeared to be depressed during interview.  [de-identified] : Patient presents with constricted affect.
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
## 2024-08-09 NOTE — REASON FOR VISIT
[Number can be texted] : number can be texted [OK  to leave message] : OK  to leave message [Psychiatric Inpatient] : Psychiatric Inpatient [[facility] Provider/Facility] : [facility] Provider/Facility [Patient] : Patient [Prior Medical Records] : Prior Medical Records [Continuity of care] : to ensure continuity of care [Telehealth (audio & video) - Individual/Group] : This visit was provided via telehealth using real-time 2-way audio visual technology. [Other Location: e.g. Home (Enter Location, City,State)___] : The provider was located at [unfilled]. [Home] : The patient, [unfilled], was located at home, [unfilled], at the time of the visit. [Verbal consent obtained from patient/other participant(s)] : Verbal consent for telehealth/telephonic services obtained from patient/other participant(s) [FreeTextEntry5] : English [FreeTextEntry6] : Camelia [FreeTextEntry7] : she/her [FreeTextEntry2] : Patient was hospitalized for major depressive episode and suicidal attempt by cutting. The patient was referred for outpatient care to continue treatment.  [FreeTextEntry1] : To alleviate depression and gain coping skills.
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
## 2024-08-09 NOTE — SOCIAL HISTORY
[FreeTextEntry1] : Employment hx: Patient works as an  and is looking to attend school to be .   developmental hx: No IDD  social supports, meaningful activities: Patient enjoys cooking, listening to music, and spending time with her family.  (Race/ethnicity, sexual identity, spirituality/Mosque): The patient is white, heterosexual, and not Episcopal.  (Spiritual Assessment Tool - [employer]   F. What is your joss or belief?   Patient is not Episcopal or spiritual.  Do you consider yourself spiritual or Episcopal?  No  Is there something you believe in that gives meaning to your life?  No  I: Is it important in your life?  No  What influence does it have on how you take care of yourself?  No spiritual or Episcopal considerations.   How have your beliefs influenced your behavior during this illness? What role do your beliefs play in regaining your health?  Patient did not speak on beliefs that influence illness.   C. Are you part of a spiritual or Episcopal community?  No  Is this of support to you and how?  No  Is there a person or group of people you really love or who are really important to you?  Patient loves her family and this is a protective factor.   H. We have been discussing your belief and supports. What else gives you internal support? Patient has internal support based on her own skills.  What are your sources of hope, strength, comfort and peace?  Patient finds comfort in spending time with loved ones.  What do you hold on to during difficult times? Patient holds onto family during difficult times.

## 2024-08-09 NOTE — PHYSICAL EXAM
[Cooperative] : cooperative [Depressed] : depressed [Constricted] : constricted [Clear] : clear [Linear/Goal Directed] : linear/goal directed [Average] : average [WNL] : within normal limits [FreeTextEntry8] : Patient appeared to be depressed during interview.  [de-identified] : Patient presents with constricted affect.

## 2024-08-09 NOTE — REASON FOR VISIT
[Number can be texted] : number can be texted [OK  to leave message] : OK  to leave message [Psychiatric Inpatient] : Psychiatric Inpatient [[facility] Provider/Facility] : [facility] Provider/Facility [Patient] : Patient [Prior Medical Records] : Prior Medical Records [Continuity of care] : to ensure continuity of care [Telehealth (audio & video) - Individual/Group] : This visit was provided via telehealth using real-time 2-way audio visual technology. [Other Location: e.g. Home (Enter Location, City,State)___] : The provider was located at [unfilled]. [Home] : The patient, [unfilled], was located at home, [unfilled], at the time of the visit. [Verbal consent obtained from patient/other participant(s)] : Verbal consent for telehealth/telephonic services obtained from patient/other participant(s) [FreeTextEntry5] : English [FreeTextEntry6] : Camelia [FreeTextEntry7] : she/her [FreeTextEntry2] : Patient was hospitalized for major depressive episode and suicidal attempt by cutting. The patient was referred for outpatient care to continue treatment.  [FreeTextEntry1] : To alleviate depression and gain coping skills.

## 2024-08-10 DIAGNOSIS — F32.A DEPRESSION, UNSPECIFIED: ICD-10-CM

## 2024-08-10 DIAGNOSIS — F41.1 GENERALIZED ANXIETY DISORDER: ICD-10-CM

## 2024-08-15 ENCOUNTER — APPOINTMENT (OUTPATIENT)
Dept: PSYCHIATRY | Facility: CLINIC | Age: 34
End: 2024-08-15
Payer: MEDICAID

## 2024-08-15 DIAGNOSIS — F41.9 ANXIETY DISORDER, UNSPECIFIED: ICD-10-CM

## 2024-08-15 DIAGNOSIS — F32.A ANXIETY DISORDER, UNSPECIFIED: ICD-10-CM

## 2024-08-15 DIAGNOSIS — F60.3 BORDERLINE PERSONALITY DISORDER: ICD-10-CM

## 2024-08-15 PROCEDURE — 99214 OFFICE O/P EST MOD 30 MIN: CPT | Mod: 95

## 2024-08-15 NOTE — PHYSICAL EXAM
[Cooperative] : cooperative [Depressed] : depressed [Constricted] : constricted [Clear] : clear [Linear/Goal Directed] : linear/goal directed [Average] : average [WNL] : within normal limits [FreeTextEntry8] : Patient appeared to be depressed during interview.  [de-identified] : Patient presents with constricted affect.

## 2024-08-15 NOTE — HISTORY OF PRESENT ILLNESS
[FreeTextEntry1] : Pt presents for scheduled medication management follow up. She reports tolerating increase in lexapro well and wishes to continue further uptitration for depression and anxiety. She remains overall stabie and denies any acute safety concerns. She continues to work and care for her kids. She is very much looking forward to engaging in therapy. She denies any acute manic or psychotic symptoms, and remains future-oriented and help-seeking.
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
## 2024-08-15 NOTE — PHYSICAL EXAM
[Cooperative] : cooperative [Depressed] : depressed [Constricted] : constricted [Clear] : clear [Linear/Goal Directed] : linear/goal directed [Average] : average [WNL] : within normal limits [FreeTextEntry8] : Patient appeared to be depressed during interview.  [de-identified] : Patient presents with constricted affect.

## 2024-08-15 NOTE — REASON FOR VISIT
[Patient preference] : as per patient preference [Continuity of care] : to ensure continuity of care [Telehealth (audio & video) - Individual/Group] : This visit was provided via telehealth using real-time 2-way audio visual technology. [Other Location: e.g. Home (Enter Location, City,State)___] : The provider was located at [unfilled]. [Home] : The patient, [unfilled], was located at home, [unfilled], at the time of the visit. [Verbal consent obtained from patient/other participant(s)] : Verbal consent for telehealth/telephonic services obtained from patient/other participant(s) [Patient] : Patient

## 2024-08-15 NOTE — PLAN
[No] : No [Medication education provided] : Medication education provided. [Rationale for medication choices, possible risks/precautions, benefits, alternative treatment choices, and consequences of non-treatment discussed] : Rationale for medication choices, possible risks/precautions, benefits, alternative treatment choices, and consequences of non-treatment discussed with patient/family/caregiver  [FreeTextEntry5] : #anxiety, depression, consider borderline personality traits/disorder (will need further exploration) - increase lexapro from 10mg to 15mg daily x 1 week, then increase to 20mg daily as tolerated - reassess in 2 weeks or sooner PRN - refer to therapy --Jose Armando to take pt's case for individual therapy
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
## 2024-08-16 NOTE — BH INPATIENT PSYCHIATRY ASSESSMENT NOTE - NSBHMSEEYE_PSY_A_CORE
Call to dtr Christy to follow-up with recent Seroquel med changes. Dtr reported mood is lower, eating, less delusion and paranoia. [facility] schedule [facility]/2024 [facility].  Dtr to call with any questions or concerns.    Fair

## 2024-08-28 ENCOUNTER — APPOINTMENT (OUTPATIENT)
Dept: PSYCHIATRY | Facility: CLINIC | Age: 34
End: 2024-08-28

## 2024-09-09 ENCOUNTER — APPOINTMENT (OUTPATIENT)
Dept: SURGERY | Facility: CLINIC | Age: 34
End: 2024-09-09

## 2024-09-11 ENCOUNTER — NON-APPOINTMENT (OUTPATIENT)
Age: 34
End: 2024-09-11

## 2024-09-11 NOTE — DISCUSSION/SUMMARY
[FreeTextEntry1] : Called the pt again and left a VM to inquire about starting therapy sessions. I will wait to hear back from pt first.

## 2024-09-24 ENCOUNTER — NON-APPOINTMENT (OUTPATIENT)
Age: 34
End: 2024-09-24

## 2024-09-25 ENCOUNTER — APPOINTMENT (OUTPATIENT)
Dept: PSYCHIATRY | Facility: CLINIC | Age: 34
End: 2024-09-25

## 2024-09-25 ENCOUNTER — OUTPATIENT (OUTPATIENT)
Dept: OUTPATIENT SERVICES | Facility: HOSPITAL | Age: 34
LOS: 1 days | End: 2024-09-25
Payer: MEDICAID

## 2024-09-25 DIAGNOSIS — F41.9 ANXIETY DISORDER, UNSPECIFIED: ICD-10-CM

## 2024-09-25 DIAGNOSIS — Z98.890 OTHER SPECIFIED POSTPROCEDURAL STATES: Chronic | ICD-10-CM

## 2024-09-25 DIAGNOSIS — F32.A ANXIETY DISORDER, UNSPECIFIED: ICD-10-CM

## 2024-09-25 DIAGNOSIS — Z90.3 ACQUIRED ABSENCE OF STOMACH [PART OF]: Chronic | ICD-10-CM

## 2024-09-25 PROCEDURE — 90832 PSYTX W PT 30 MINUTES: CPT

## 2024-09-26 DIAGNOSIS — F60.3 BORDERLINE PERSONALITY DISORDER: ICD-10-CM

## 2024-09-26 NOTE — PHYSICAL EXAM
[Cooperative] : cooperative [Euthymic] : euthymic [Constricted] : constricted [Clear] : clear [Linear/Goal Directed] : linear/goal directed [Average] : average [WNL] : within normal limits
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
## 2024-09-26 NOTE — END OF VISIT
[Duration of Psychotherapy Visit (minutes spent in synchronous communication): ____] : Duration of Psychotherapy Visit (minutes spent in synchronous communication): [unfilled] [Individual Psychotherapy for 16-37 minutes] : Individual Psychotherapy for 16-37 minutes [Teletherapy Service Provided] : The services provided in this session were delivered via tele-therapy [FreeTextEntry3] : Home [FreeTextEntry5] : [address] [place] [address] [Licensed Clinician] : Licensed Clinician [FreeTextEntry1] : Jose Armando Santana LMSW

## 2024-09-26 NOTE — REASON FOR VISIT
[Patient preference] : as per patient preference [Starting, patient seen in-person within last 6 months] : Telehealth services are being started as patient has seen in-person within last 6 months. [Telehealth (audio & video) - Individual/Group] : This visit was provided via telehealth using real-time 2-way audio visual technology. [Medical Office: ([facility])___] : The provider was located at the medical office in [unfilled]. [Home] : The patient, [unfilled], was located at home, [unfilled], at the time of the visit. [Patient's space is appropriate for telehealth and maintains privacy/confidentiality.] : Patient's space is appropriate for telehealth and maintains privacy/confidentiality. [Participant(s) identity verified] : Participant(s) identity verified. [Verbal consent obtained from patient/other participant(s)] : Verbal consent for telehealth/telephonic services obtained from patient/other participant(s) [FreeTextEntry5] : 4:00pm [FreeTextEntry4] : 3:30pm [Patient] : Patient [FreeTextEntry1] : Building rapport and establishing goals for treatment

## 2024-09-26 NOTE — END OF VISIT
[Duration of Psychotherapy Visit (minutes spent in synchronous communication): ____] : Duration of Psychotherapy Visit (minutes spent in synchronous communication): [unfilled] [Individual Psychotherapy for 16-37 minutes] : Individual Psychotherapy for 16-37 minutes [Teletherapy Service Provided] : The services provided in this session were delivered via tele-therapy [FreeTextEntry3] : Home [FreeTextEntry5] : [address] [place] [address] [Licensed Clinician] : Licensed Clinician [FreeTextEntry1] : Jose Armando Santana LMSW
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
## 2024-09-26 NOTE — REASON FOR VISIT
[Patient preference] : as per patient preference [Starting, patient seen in-person within last 6 months] : Telehealth services are being started as patient has seen in-person within last 6 months. [Telehealth (audio & video) - Individual/Group] : This visit was provided via telehealth using real-time 2-way audio visual technology. [Medical Office: ([facility])___] : The provider was located at the medical office in [unfilled]. [Home] : The patient, [unfilled], was located at home, [unfilled], at the time of the visit. [Patient's space is appropriate for telehealth and maintains privacy/confidentiality.] : Patient's space is appropriate for telehealth and maintains privacy/confidentiality. [Participant(s) identity verified] : Participant(s) identity verified. [Verbal consent obtained from patient/other participant(s)] : Verbal consent for telehealth/telephonic services obtained from patient/other participant(s) [FreeTextEntry5] : 4:00pm [FreeTextEntry4] : 3:30pm [Patient] : Patient [FreeTextEntry1] : Building rapport and establishing goals for treatment

## 2024-10-02 ENCOUNTER — APPOINTMENT (OUTPATIENT)
Dept: PSYCHIATRY | Facility: CLINIC | Age: 34
End: 2024-10-02

## 2024-10-02 NOTE — DISCUSSION/SUMMARY
[1. Helpful Person/Contact Number: _____] : 1. Helpful Person/Contact Number: [unfilled] [2. Helpful Person/Contact Number: _____] : 2. Helpful Person/Contact Number: [unfilled] [a. Clinician Name/Contact Information: _____] : Clinician Name/Contact Information: [unfilled] [b. Clinician Name/Contact Information: _____] : Clinician Name/Contact Information: [unfilled] [c. Local ED/Urgent Care Services/Hotlines (Name/Address/Phone):] : Local ED/Urgent Care Services/Hotlines (Name/Address/Phone): [d. Suicide Prevention Lifeline Phone: 2-313-063-TALK ([phone]) ] : Suicide Prevention Lifeline Phone: 1-374-948-XPCB (4841)  [FreeTextEntry1] : 10/2/2024 [FreeTextEntry2] : Pt is unsure of triggers. [FreeTextEntry3] : Spontaneous sadness  Feeling numb Staying in bed too much. Suicidal thoughts [FreeTextEntry4] : Cleaning  Physical activity - Running, go to gym,  [FreeTextEntry5] : Going to the [facility].  [de-identified] : [facility] [de-identified] : Getting rid of certain objects to self-harm.  [de-identified] : Not staying busy or productive.  [de-identified] : Having meaningful activities to engage in on a daily basis.  [de-identified] : Children

## 2024-10-02 NOTE — DISCUSSION/SUMMARY
[1. Helpful Person/Contact Number: _____] : 1. Helpful Person/Contact Number: [unfilled] [2. Helpful Person/Contact Number: _____] : 2. Helpful Person/Contact Number: [unfilled] [a. Clinician Name/Contact Information: _____] : Clinician Name/Contact Information: [unfilled] [b. Clinician Name/Contact Information: _____] : Clinician Name/Contact Information: [unfilled] [c. Local ED/Urgent Care Services/Hotlines (Name/Address/Phone):] : Local ED/Urgent Care Services/Hotlines (Name/Address/Phone): [d. Suicide Prevention Lifeline Phone: [phone] ([phone] ] : Suicide Prevention Lifeline Phone: [phone] ([phone])  [FreeTextEntry1] : 10/2/2024 [FreeTextEntry2] : Pt is unsure of triggers. [FreeTextEntry3] : Spontaneous sadness  Feeling numb Staying in bed too much. Suicidal thoughts [FreeTextEntry4] : Cleaning  Physical activity - Running, go to gym,  [FreeTextEntry5] : Going to the [facility].  [de-identified] : [facility] [de-identified] : Getting rid of certain objects to self-harm.  [de-identified] : Not staying busy or productive.  [de-identified] : Having meaningful activities to engage in on a daily basis.  [de-identified] : Children

## 2024-10-02 NOTE — DISCUSSION/SUMMARY
[FreeTextEntry1] : Pt called to schedule first therapy session. We are going to meet for session next week.

## 2024-10-02 NOTE — DISCUSSION/SUMMARY
REASON FOR VISIT:    Chief Complaint   Patient presents with   • Gastro-intestinal Problem     stable        LAST VISIT WITH ME:  10/24/2019    HISTORY OF PRESENT ILLNESS:    The patient is a pleasant 71 year old female who is being followed by GI for chronic GERD without esophagitis with history of gastric and duodenal AVMs, lymphocytic colitis, iron deficiency anemia due to chronic blood loss.  She is accompanied by her daughter-in-law Treva and gives her verbal permission for her healthcare to be discussed in Treva's present.  She states she has been doing well overall other than the multiple hospitalizations over the past year.  Most recently hospitalized for ventricular tachycardia with placement of AICD.  She has no complaints today.  Passing stool daily with an occasional skip days.  Stool consistency described as formed unless she does not have a bowel movement for a day or 2. Then her stool will be harder followed by mush.  Did not recall being instructed to reduce budesonide from 9 mg to 6 mg daily.  She has been taking 9 mg daily.  History of not tolerating budesonide taper therefore requires a slow taper so recurrence of diarrhea from lymphocytic colitis does not recur.  Denies abdominal bloating, abdominal cramping, diarrhea, melena, hematochezia, dyschezia, tenesmus, steatorrhea.    Regarding reflux she states her symptoms of heartburn and epigastric pain have been very well controlled.  Is compliant with daily omeprazole.  Does not use breakthrough medication.  Positive history of gastric and duodenal AVMs that have caused iron deficiency anemia due to chronic blood loss.  Takes iron supplementation b.i.d..  I reviewed with her the results of her most recent H&H from 12/19/2019 showing completely normal with hemoglobin 14.0 and hematocrit 41.1%.  Denies early satiety, nausea, emesis, hematemesis, acid regurgitation, nocturnal cough, hoarseness, globus sensation, dysphagia, odynophagia, melena,  [1. Helpful Person/Contact Number: _____] : 1. Helpful Person/Contact Number: [unfilled] [2. Helpful Person/Contact Number: _____] : 2. Helpful Person/Contact Number: [unfilled] hematochezia.  Follows recommended anti-reflux measures.  Remains morbidly obese and using wheelchair for mobility currently she is working with physical therapy to increase her strength.  Is oxygen dependent.    I have reviewed the patient's medications and allergies, past medical, surgical, social and family history, updating these as appropriate.  See Histories section of the electronic medical record for a display of this information.    PAST MEDICAL HISTORY:    Breast cancer (CMS/[facility])                         02/01/2008      Comment: invasive ductal, left breast, s/p mastectomy    CAD (coronary artery disease)                                 Diverticulitis                                                Fibromyalgia                                                  Gout                                                          Lymphedema                                                    Retinal detachment of right eye with retinal b* 12/6/12-0*    Family history of ischemic heart disease        10/17/2013    Depression                                                    Essential (primary) hypertension                              Pneumonia                                                     Hiatal hernia                                                 GERD (gastroesophageal reflux disease)                        HLD (hyperlipidemia)                                          Raynaud's disease                                             Tobacco user                                                  Sleep apnea                                                     Comment: using CPAP ( 02 at 2 L at night with CPAP)    Pulmonary hypertension (CMS/HCC)                              COPD, moderate (CMS/HCC)                        8/19/2014     Myocardial infarction (CMS/[facility])                               TIA (transient ischemic attack)                               Osteoporosis                                                   Congestive cardiac failure (CMS/[facility])                          Urinary incontinence                                          Fracture                                                      Chronic pain                                                  Diabetes mellitus (CMS/[facility])                                   PAST SURGICAL HISTORY:    CORONARY ARTERY BYPASS GRAFT                    04/09/2012      Comment: x3    LEFT HEART CATH,PERCUTANEOUS                    2012            Comment: Cardiac Cath    APPENDECTOMY                                                  CARPAL TUNNEL RELEASE                           1983            Comment: Carpal Tunnel; bilateral    ROTATOR CUFF REPAIR                                             Comment: 2002 and 8/31/2010 left: 1989 and 9/05/2013                right    LEFT HEART CATH,PERCUTANEOUS                    2008            Comment: Cardiac Cath    CHOLECYSTECTOMY                                 01/01/1976    HYSTERECTOMY                                    01/01/1982    MASTECTOMY                                      02/12/2008      Comment: bilateral    COLONOSCOPY                                     02/14/2013    LEFT HEART CATH INCLUDING INJECTIONS            03/09/2013    ESOPHAGOGASTRODUODENOSCOPY TRANSORAL FLEX W/BX* 02/24/2013      Comment: EGD with Bx    TOTAL KNEE REPLACEMENT                          08/20/2009      Comment: left knee. Rebecca Bauer    INSERTION OF ACCESS PORT                        03/05/2008      Comment: (has since been removed)    BREAST BIOPSY                                                   Comment: R 2000, 2002    COLONOSCOPY                                     05/16/2007    KNEE SURGERY                                    01/15/2007      Comment: knee arthroscopy, meniscectomy    CYSTOURETHROSCOPY                               07/05/2006    CYSTOURETHROSCOPY                               06/02/2006      Comment: w/biopsy periurethral tissue     [a. Clinician Name/Contact Information: _____] : Clinician Name/Contact Information: [unfilled] LEFT HEART CATH INCLUDING INJECTIONS            2004    TUBAL LIGATION                                  1978     DELIVERY+POSTPARTUM CARE               ,     RETINAL DETACHMENT SURGERY                      ,       Comment: retinal reattachment    HB IVC FILTER PLACEMENT S&I                               REMV 2ND CATARACT,CORN-SCLER SECTN                          Comment: Cataract removal    LASIK                                                       Comment: bilateral eye lasik surgery    ABLATION ATRIAL FLUTTER - CV                    3/2014          Comment: CODED during procedure - hypoxic, hypertensive-               \"acute on chronic right ventricular failure\"    HAND SURGERY                                    6/4/15          Comment: left thumb basal joint arthroplasty, tendon                interposition    BREAST SURGERY                                                NM ISAIAS PER RST/STRS PHARMACOLO                                HAND SURGERY                                    16         Comment: thumb basal joint arthroplasty, tendon                interposition    COLONOSCOPY                                     11/15/2018    CARDIAC SURGERY                                               ABDOMEN SURGERY                                               CARDIAC CATHETERIZATION                                       REMOVAL GALLBLADDER                                           EYE SURGERY                                                   ESOPHAGOGASTRODUODENOSCOPY                      04/15/2019    ESOPHAGOGASTRODUODENOSCOPY                      2019    COLONOSCOPY                                     2019    MEDIAL BRANCH BLOCK                             10/08/2019    Current Outpatient Medications   Medication Sig   • nicotine (NICODERM) 21 MG/24HR patch Place 1 patch onto the skin every 24 hours.   • nicotine (NICODERM) 14 MG/24HR patch Place 1 patch  [b. Clinician Name/Contact Information: _____] : Clinician Name/Contact Information: [unfilled] [c. Local ED/Urgent Care Services/Hotlines (Name/Address/Phone):] : Local ED/Urgent Care Services/Hotlines (Name/Address/Phone): onto the skin every 24 hours. (DO NOT START until AFTER finishing 28 days of 21mg patch)   • nicotine (NICODERM) 7 MG/24HR patch Place 1 patch onto the skin every 24 hours. (DO NOT START until AFTER 28 days of 14mg patch)   • suvorexant 10 MG tablet Take 1 tablet by mouth at bedtime as needed (insomnia).   • metoCLOPramide (REGLAN) 5 MG/5ML solution Take 5 mLs by mouth 4 times daily (before meals and nightly). AS NEEDED   • potassium chloride (KLOR-CON, K-TAB) 10 MEQ ER tablet Take 1 tablet by mouth daily.   • amLODIPine (NORVASC) 10 MG tablet Take 1 tablet by mouth daily. Do not start before December 20, 2019.   • metFORMIN (GLUCOPHAGE) 500 MG tablet Take 1 tablet by mouth 2 times daily (with meals). Do not start before December 3, 2019.   • ondansetron (ZOFRAN ODT) 4 MG disintegrating tablet Place 1 tablet onto the tongue every 8 hours as needed for Nausea.   • DULoxetine (CYMBALTA) 30 MG capsule Take 1 capsule by mouth daily.   • omeprazole (PRILOSEC) 20 MG capsule Take 1 capsule by mouth daily.   • furosemide (LASIX) 40 MG tablet Take 1 tablet by mouth daily.   • atorvastatin (LIPITOR) 10 MG tablet Take 1 tablet by mouth daily.   • docusate sodium-sennosides (SENOKOT S) 50-8.6 MG per tablet Take 2 tablets by mouth daily. (Patient taking differently: Take 2 tablets by mouth daily as needed for Constipation. )   • ranolazine (RANEXA) 500 MG 12 hr tablet Take 1 tablet by mouth 2 times daily.   • budesonide (ENTOCORT EC) 3 MG 24 hr capsule Take 3 capsules by mouth every morning. Indications: microscopic colitis - diarrhea   • Ferrous Sulfate (IRON) 325 (65 Fe) MG Tab Take 1 tablet by mouth 2 times daily.   • ascorbic acid (VITAMIN C) 1000 MG tablet Take 1 tablet by mouth 3 times daily.   • mirabegron ER (MYRBETRIQ) 25 MG 24 hour tablet Take 1 tablet by mouth daily.   • blood glucose test strip Test blood sugars 4 times daily.   • blood glucose lancets Test blood sugars 4 times daily.   • spironolactone (ALDACTONE)  25 MG tablet Take 1 tablet by mouth daily.   • fluticasone-salmeterol (ADVAIR DISKUS) 250-50 MCG/DOSE inhaler Inhale 1 puff into the lungs two times daily.   • aspirin 81 MG EC tablet Take 1 tablet by mouth daily.   • acetaminophen (TYLENOL) 500 MG tablet Take 2 tablets by mouth every 6 hours as needed for Pain.   • nystatin (MYCOSTATIN) 634318 UNIT/GM powder Apply 1 application topically 3 times daily as needed (skin rash under stomach fold).    • ticagrelor (BRILINTA) 90 MG Tab Take 1 tablet by mouth 2 times daily.   • OnabotulinumtoxinA (BOTOX IJ) Inject as directed every 12 weeks.    • magnesium 250 MG tablet Take 250 mg by mouth daily.   • Calcium Carbonate (CALCIUM 600 PO) Take 1 tablet by mouth daily.   • Insulin Pen Needle 31G X 8 MM Misc Use with Insulin Pen 4 times daily   • Melatonin 10 MG Tab Take 10 mg by mouth at bedtime.    • Multiple Vitamins-Minerals (VITAMIN - THERAPEUTIC MULTIVITAMINS W/MINERALS) Tab Take 1 tablet by mouth daily.   • loratadine (CLARITIN) 10 MG tablet Take 1 tablet by mouth daily. (Patient taking differently: Take 10 mg by mouth daily as needed for Allergies. )   • nitroGLYcerin (NITROSTAT) 0.4 MG sublingual tablet Place 1 tablet under the tongue every 5 min as needed for chest pain. Max of 3 doses max then seek medical attention if pain not relieved.   • Cholecalciferol 2000 units Tab Take 2,000 Units by mouth daily.    • Ferrous Sulfate (IRON) 325 (65 Fe) MG Tab Take 1 tablet by mouth 3 times daily.   • HYDROcodone-acetaminophen (NORCO) 5-325 MG per tablet Take 2 tablets by mouth every 6 hours as needed for Pain.   • polyethylene glycol (GLYCOLAX, MIRALAX) packet Take 17 g by mouth daily as needed (constipation).     No current facility-administered medications for this visit.        ALLERGIES:   Allergen Reactions   • Codeine ANAPHYLAXIS     Per patient, has tolerated morphine in the past.   • Lyrica DIZZINESS and ANAPHYLAXIS   • Accupril NAUSEA   • Dye [Contrast Media] NAUSEA  [d. Suicide Prevention Lifeline Phone: [phone] ] : Suicide Prevention Lifeline Phone: [phone])      IVP dye     • Macrodantin [Nitrofurantoin Macrocrystal] NAUSEA   • Povidone-Iodine HIVES     Betadine   • Pregabalin VOMITING     Vertigo and vomiting. (lyrica)   • Vicodin Tuss [Hydrocodone-Guaifenesin] NAUSEA   • Piroxicam NAUSEA   • Talwin HIVES       Family History   Problem Relation Age of Onset   • Congenital Heart Disease Mother    • Myocardial Infarction Father    • Heart disease Sister    • Heart disease Sister    • Diabetes Sister        Social History     Socioeconomic History   • Marital status:      Spouse name: Not on file   • Number of children: Not on file   • Years of education: Not on file   • Highest education level: Not on file   Occupational History   • Not on file   Social Needs   • Financial resource strain: Not on file   • Food insecurity:     Worry: Not on file     Inability: Not on file   • Transportation needs:     Medical: Not on file     Non-medical: Not on file   Tobacco Use   • Smoking status: Current Every Day Smoker     Packs/day: 1.00     Years: 40.00     Pack years: 40.00     Types: Cigarettes     Start date: 2/26/1975   • Smokeless tobacco: Never Used   Substance and Sexual Activity   • Alcohol use: No     Alcohol/week: 0.0 standard drinks     Frequency: Never     Drinks per session: 1 or 2     Binge frequency: Never   • Drug use: No   • Sexual activity: Not Currently   Lifestyle   • Physical activity:     Days per week: Not on file     Minutes per session: Not on file   • Stress: Not on file   Relationships   • Social connections:     Talks on phone: Not on file     Gets together: Not on file     Attends Yazidism service: Not on file     Active member of club or organization: Not on file     Attends meetings of clubs or organizations: Not on file     Relationship status: Not on file   • Intimate partner violence:     Fear of current or ex partner: No     Emotionally abused: No     Physically abused: No     Forced sexual activity: No   Other Topics Concern   •  [FreeTextEntry1] : 10/2/2024  Service Not Asked   • Blood Transfusions Not Asked   • Caffeine Concern Not Asked   • Occupational Exposure Not Asked   • Hobby Hazards Not Asked   • Sleep Concern Not Asked   • Stress Concern Not Asked   • Weight Concern Not Asked   • Special Diet Yes     Comment: Low sodium   • Back Care Not Asked   • Exercise No   • Bike Helmet Not Asked   • Seat Belt No     Comment: on highway yes   • Self-Exams Not Asked   Social History Narrative   • Not on file       REVIEW OF SYSTEMS:  A complete review of systems was performed and found to be negative except for what was stated in the History of Present Illness.    PHYSICAL EXAM:  Vitals:    Visit Vitals  /64 (BP Location: RUE - Right upper extremity, Patient Position: Sitting, Cuff Size: Large Adult)   Pulse 64       Constitutional:  Well developed, well nourished. Morbidly obese.  Musculoskeletal: Patient moving all extremities appropriately. Wheelchair.   Psychiatric: Alert and oriented x 3, pleasant and cooperative, in no acute distress. Normal mood and affect, good eye contact.   Physical exam otherwise deferred    ASSESSMENT:  1. Lymphocytic colitis - stable with no diarrhea. History of intolerance to budesonide taper. Did not reduce after last OV as advised. Discussed taper, agrees to reduce and Treva will help set up pill box.    2. Gastric AVM - no recent bleed. Stable H&H, on iron supplementation   3. Gastroesophageal reflux disease, esophagitis presence not specified - stable, well controlled.  Reviewed anti-reflux measures.   4. Anemia due to GI blood loss - stable with normal H&H, on iron supplementation.  No recurrence of GI bleed.       PLAN:  1. Budesonide reduced to 6 mg daily; no refill needed  2.-3.  Omeprazole 20 mg daily 30 minutes a.c.; no refill needed  4. Ferrous sulfate 325 mg b.i.d.      No orders of the defined types were placed in this encounter.        FOLLOW-UP:  Return in about 8 weeks (around 3/12/2020), or if symptoms worsen  [FreeTextEntry2] : Pt is unsure of triggers. [FreeTextEntry3] : Spontaneous sadness  Feeling numb Staying in bed too much. Suicidal thoughts or fail to improve, for GERD, lymphocytic colitis .     [FreeTextEntry4] : Cleaning  Physical activity - Running, go to gym,  [FreeTextEntry5] : Going to the [facility].  [de-identified] : [facility] [de-identified] : Getting rid of certain objects to self-harm.  [de-identified] : Not staying busy or productive.  [de-identified] : Having meaningful activities to engage in on a daily basis.  [de-identified] : Children

## 2024-10-02 NOTE — DISCUSSION/SUMMARY
[1. Helpful Person/Contact Number: _____] : 1. Helpful Person/Contact Number: [unfilled] [2. Helpful Person/Contact Number: _____] : 2. Helpful Person/Contact Number: [unfilled] [a. Clinician Name/Contact Information: _____] : Clinician Name/Contact Information: [unfilled] [b. Clinician Name/Contact Information: _____] : Clinician Name/Contact Information: [unfilled] [c. Local ED/Urgent Care Services/Hotlines (Name/Address/Phone):] : Local ED/Urgent Care Services/Hotlines (Name/Address/Phone): [d. Suicide Prevention Lifeline Phone: [phone] ([phone] ] : Suicide Prevention Lifeline Phone: [phone] ([phone])  [FreeTextEntry1] : 10/2/2024 [FreeTextEntry2] : Pt is unsure of triggers. [FreeTextEntry3] : Spontaneous sadness  Feeling numb Staying in bed too much. Suicidal thoughts [FreeTextEntry4] : Cleaning  Physical activity - Running, go to gym,  [FreeTextEntry5] : Going to the [facility].  [de-identified] : [facility] ER [de-identified] : Getting rid of certain objects to self-harm.  [de-identified] : Not staying busy or productive.  [de-identified] : Having meaningful activities to engage in on a daily basis.  [de-identified] : Children

## 2024-10-03 NOTE — END OF VISIT
Problem: Falls - Risk of  Goal: Absence of falls  Outcome: Ongoing      Problem: Risk for Impaired Skin Integrity  Goal: Tissue integrity - skin and mucous membranes  Structural intactness and normal physiological function of skin and  mucous membranes.    Outcome: Ongoing      Problem: Mobility - Impaired:  Goal: Mobility will improve  Mobility will improve   Outcome: Ongoing      Problem: Infection - Surgical Site:  Goal: Will show no infection signs and symptoms  Will show no infection signs and symptoms   Outcome: Ongoing      Problem: Pain - Acute:  Goal: Pain level will decrease  Pain level will decrease    Outcome: Ongoing      Problem: Pain:  Goal: Control of acute pain  Control of acute pain   Outcome: Ongoing    Goal: Pain level will decrease  Pain level will decrease    Outcome: Ongoing [Duration of Psychotherapy Visit (minutes spent in synchronous communication): ____] : Duration of Psychotherapy Visit (minutes spent in synchronous communication): [unfilled] [Individual Psychotherapy for 16-37 minutes] : Individual Psychotherapy for 16-37 minutes [Teletherapy Service Provided] : The services provided in this session were delivered via tele-therapy [FreeTextEntry3] : Home [FreeTextEntry5] : [address] [place] [address] [Licensed Clinician] : Licensed Clinician [FreeTextEntry1] : Jose Armando Santana LMSW

## 2024-10-03 NOTE — PLAN
[FreeTextEntry2] : TBD [Cognitive and/or Behavior Therapy] : Cognitive and/or Behavior Therapy  [Dialectical Behavior Therapy] : Dialectical Behavior Therapy  [Psychodynamic Therapy] : Psychodynamic Therapy  [Supportive Therapy] : Supportive Therapy [de-identified] : Camelia and I met for initial psychotherapy session today virtually from 3:30pm to 4:00pm. We focused on more safety planning and exploring current goals and priorities. The patient reports a self-harming incident 2 weeks ago via cutting due to feeling depressed. We discussed warning signs, coping strategies, and how to make the environment safer to reduce risk of self-harm. We processed relationships and her willingness to focus on herself at this time. She has been keeping productive with raising her children, working, and trying to get into [employer]. I incorporated CBT, DBT, and supportive therapy to provide feedback and to explore coping skills for distress. Camelia is understanding of the safety plan and is aware of her warning signs such as: spending too much time in bed, sadness, and feeling numb. We identified going to the gym, and cleaning as two coping strategies to do instead of self-harming. She currently denies any suicidal ideation. There are no acute safety concerns and she is feeling better with medication. We will meet for weekly sessions going forward to process trauma and other ways to manage stress.  [Recommended Frequency of Visits: ____] : Recommended frequency of visits: [unfilled] [Return in ____ week(s)] : Return in [unfilled] week(s) [FreeTextEntry1] : Complete tx plan Process trauma Meet for session next week

## 2024-10-03 NOTE — PLAN
[FreeTextEntry2] : TBD [Cognitive and/or Behavior Therapy] : Cognitive and/or Behavior Therapy  [Dialectical Behavior Therapy] : Dialectical Behavior Therapy  [Psychodynamic Therapy] : Psychodynamic Therapy  [Supportive Therapy] : Supportive Therapy [de-identified] : Camelia and I met for initial psychotherapy session today virtually from 3:30pm to 4:00pm. We focused on more safety planning and exploring current goals and priorities. The patient reports a self-harming incident 2 weeks ago via cutting due to feeling depressed. We discussed warning signs, coping strategies, and how to make the environment safer to reduce risk of self-harm. We processed relationships and her willingness to focus on herself at this time. She has been keeping productive with raising her children, working, and trying to get into [employer]. I incorporated CBT, DBT, and supportive therapy to provide feedback and to explore coping skills for distress. Camelia is understanding of the safety plan and is aware of her warning signs such as: spending too much time in bed, sadness, and feeling numb. We identified going to the gym, and cleaning as two coping strategies to do instead of self-harming. She currently denies any suicidal ideation. There are no acute safety concerns and she is feeling better with medication. We will meet for weekly sessions going forward to process trauma and other ways to manage stress.  [Recommended Frequency of Visits: ____] : Recommended frequency of visits: [unfilled] [Return in ____ week(s)] : Return in [unfilled] week(s) [FreeTextEntry1] : Complete tx plan Process trauma Meet for session next week

## 2024-10-03 NOTE — REASON FOR VISIT
[Patient preference] : as per patient preference [Starting, patient seen in-person within last 6 months] : Telehealth services are being started as patient has seen in-person within last 6 months. [Telehealth (audio & video) - Individual/Group] : This visit was provided via telehealth using real-time 2-way audio visual technology. [Medical Office: ([facility])___] : The provider was located at the medical office in [unfilled]. [Home] : The patient, [unfilled], was located at home, [unfilled], at the time of the visit. [Patient's space is appropriate for telehealth and maintains privacy/confidentiality.] : Patient's space is appropriate for telehealth and maintains privacy/confidentiality. [Participant(s) identity verified] : Participant(s) identity verified. [Verbal consent obtained from patient/other participant(s)] : Verbal consent for telehealth/telephonic services obtained from patient/other participant(s) [FreeTextEntry4] : 3:30pm [FreeTextEntry5] : 4:00pm [Patient] : Patient [FreeTextEntry1] : Safety Planning and discussing goals.

## 2024-10-03 NOTE — END OF VISIT
[Duration of Psychotherapy Visit (minutes spent in synchronous communication): ____] : Duration of Psychotherapy Visit (minutes spent in synchronous communication): [unfilled] [Individual Psychotherapy for 16-37 minutes] : Individual Psychotherapy for 16-37 minutes [Teletherapy Service Provided] : The services provided in this session were delivered via tele-therapy [FreeTextEntry3] : Home [FreeTextEntry5] : [address] [place] [address] [Licensed Clinician] : Licensed Clinician [FreeTextEntry1] : Jose Armando Santana LMSW

## 2024-10-03 NOTE — REASON FOR VISIT
[Patient preference] : as per patient preference [Starting, patient seen in-person within last 6 months] : Telehealth services are being started as patient has seen in-person within last 6 months. [Telehealth (audio & video) - Individual/Group] : This visit was provided via telehealth using real-time 2-way audio visual technology. [Medical Office: ([facility])___] : The provider was located at the medical office in [unfilled]. [Home] : The patient, [unfilled], was located at home, [unfilled], at the time of the visit. [Patient's space is appropriate for telehealth and maintains privacy/confidentiality.] : Patient's space is appropriate for telehealth and maintains privacy/confidentiality. [Participant(s) identity verified] : Participant(s) identity verified. [Verbal consent obtained from patient/other participant(s)] : Verbal consent for telehealth/telephonic services obtained from patient/other participant(s) [FreeTextEntry4] : 3:30pm [FreeTextEntry5] : 4:00pm [Patient] : Patient [FreeTextEntry1] : Safety Planning and discussing goals.

## 2024-10-03 NOTE — REASON FOR VISIT
[Patient preference] : as per patient preference [Starting, patient seen in-person within last 6 months] : Telehealth services are being started as patient has seen in-person within last 6 months. [Telehealth (audio & video) - Individual/Group] : This visit was provided via telehealth using real-time 2-way audio visual technology. [Medical Office: ([facility])___] : The provider was located at the medical office in [unfilled]. [Home] : The patient, [unfilled], was located at home, [unfilled], at the time of the visit. [Patient's space is appropriate for telehealth and maintains privacy/confidentiality.] : Patient's space is appropriate for telehealth and maintains privacy/confidentiality. [Participant(s) identity verified] : Participant(s) identity verified. [Verbal consent obtained from patient/other participant(s)] : Verbal consent for telehealth/telephonic services obtained from patient/other participant(s) [FreeTextEntry4] : 3:30pm [FreeTextEntry5] : 4:00pm [Patient] : Patient [FreeTextEntry1] : Safety Planning and discussing goals.

## 2024-10-03 NOTE — END OF VISIT
[Duration of Psychotherapy Visit (minutes spent in synchronous communication): ____] : Duration of Psychotherapy Visit (minutes spent in synchronous communication): [unfilled] [Individual Psychotherapy for 16-37 minutes] : Individual Psychotherapy for 16-37 minutes [Teletherapy Service Provided] : The services provided in this session were delivered via tele-therapy [FreeTextEntry3] : Home [FreeTextEntry5] : [address] [place] [address] [Licensed Clinician] : Licensed Clinician [FreeTextEntry1] : Jose Armando Santana LMSW

## 2024-10-03 NOTE — PLAN
[FreeTextEntry2] : TBD [Cognitive and/or Behavior Therapy] : Cognitive and/or Behavior Therapy  [Dialectical Behavior Therapy] : Dialectical Behavior Therapy  [Psychodynamic Therapy] : Psychodynamic Therapy  [Supportive Therapy] : Supportive Therapy [de-identified] : Camelia and I met for initial psychotherapy session today virtually from 3:30pm to 4:00pm. We focused on more safety planning and exploring current goals and priorities. The patient reports a self-harming incident 2 weeks ago via cutting due to feeling depressed. We discussed warning signs, coping strategies, and how to make the environment safer to reduce risk of self-harm. We processed relationships and her willingness to focus on herself at this time. She has been keeping productive with raising her children, working, and trying to get into [employer]. I incorporated CBT, DBT, and supportive therapy to provide feedback and to explore coping skills for distress. Camelia is understanding of the safety plan and is aware of her warning signs such as: spending too much time in bed, sadness, and feeling numb. We identified going to the gym, and cleaning as two coping strategies to do instead of self-harming. She currently denies any suicidal ideation. There are no acute safety concerns and she is feeling better with medication. We will meet for weekly sessions going forward to process trauma and other ways to manage stress.  [Recommended Frequency of Visits: ____] : Recommended frequency of visits: [unfilled] [Return in ____ week(s)] : Return in [unfilled] week(s) [FreeTextEntry1] : Complete tx plan Process trauma Meet for session next week

## 2024-10-03 NOTE — REASON FOR VISIT
[Patient preference] : as per patient preference [Starting, patient seen in-person within last 6 months] : Telehealth services are being started as patient has seen in-person within last 6 months. [Telehealth (audio & video) - Individual/Group] : This visit was provided via telehealth using real-time 2-way audio visual technology. [Medical Office: ([facility])___] : The provider was located at the medical office in [unfilled]. [Home] : The patient, [unfilled], was located at home, [unfilled], at the time of the visit. [Patient's space is appropriate for telehealth and maintains privacy/confidentiality.] : Patient's space is appropriate for telehealth and maintains privacy/confidentiality. [Participant(s) identity verified] : Participant(s) identity verified. [Verbal consent obtained from patient/other participant(s)] : Verbal consent for telehealth/telephonic services obtained from patient/other participant(s) [FreeTextEntry4] : 3:30pm [FreeTextEntry5] : 4:00pm [Patient] : Patient [FreeTextEntry1] : Safety Planning and discussing goals.

## 2024-10-03 NOTE — PLAN
[FreeTextEntry2] : TBD [Cognitive and/or Behavior Therapy] : Cognitive and/or Behavior Therapy  [Dialectical Behavior Therapy] : Dialectical Behavior Therapy  [Psychodynamic Therapy] : Psychodynamic Therapy  [Supportive Therapy] : Supportive Therapy [de-identified] : Camelia and I met for initial psychotherapy session today virtually from 3:30pm to 4:00pm. We focused on more safety planning and exploring current goals and priorities. The patient reports a self-harming incident 2 weeks ago via cutting due to feeling depressed. We discussed warning signs, coping strategies, and how to make the environment safer to reduce risk of self-harm. We processed relationships and her willingness to focus on herself at this time. She has been keeping productive with raising her children, working, and trying to get into [employer]. I incorporated CBT, DBT, and supportive therapy to provide feedback and to explore coping skills for distress. Camelia is understanding of the safety plan and is aware of her warning signs such as: spending too much time in bed, sadness, and feeling numb. We identified going to the gym, and cleaning as two coping strategies to do instead of self-harming. She currently denies any suicidal ideation. There are no acute safety concerns and she is feeling better with medication. We will meet for weekly sessions going forward to process trauma and other ways to manage stress.  [Recommended Frequency of Visits: ____] : Recommended frequency of visits: [unfilled] [Return in ____ week(s)] : Return in [unfilled] week(s) [FreeTextEntry1] : Complete tx plan Process trauma Meet for session next week

## 2024-10-03 NOTE — END OF VISIT
[Duration of Psychotherapy Visit (minutes spent in synchronous communication): ____] : Duration of Psychotherapy Visit (minutes spent in synchronous communication): [unfilled] [Individual Psychotherapy for 16-37 minutes] : Individual Psychotherapy for 16-37 minutes [Teletherapy Service Provided] : The services provided in this session were delivered via tele-therapy [FreeTextEntry3] : Home [FreeTextEntry5] : [address] [place] [address] [Licensed Clinician] : Licensed Clinician [FreeTextEntry1] : Jose Armando Santana LMSW

## 2024-10-08 NOTE — BH INPATIENT PSYCHIATRY ASSESSMENT NOTE - NSTXRELFACTOR_PSY_ALL_CORE
-- DO NOT REPLY / DO NOT REPLY ALL --  -- This inbox is not monitored. If this was sent to the wrong provider or department, reroute message to P [facility] Reroute pool. --  -- Message is from Engagement Center Operations ([facility]O) --      Message Type:  Refill Medication   Refill request for Pended medication named: Ketoconazole 2% (patient will like to know if she can get a larger tube In refills on it )  Preferred pharmacy verified, and selected.   [id number] - [place], [place] - [address]  AT [address]    Is the patient OUT of Medication?  Yes and Medication Refills handled by [facility] Clinical        Message: patient is almost out                    
Medication: ketoconazole (NIZORAL) 2 % cream  passed protocol.   Last office visit date: 07/30/2024  Next appointment scheduled?: Yes   Number of refills given: 3    Pt has scheduled appt on   11/18/2024 2:00 PM Jolanta Daniels MD [facility] text sent to notify rx was sent to pharmacy.  
Non-compliant or not receiving treatment

## 2024-10-10 ENCOUNTER — APPOINTMENT (OUTPATIENT)
Dept: PSYCHIATRY | Facility: CLINIC | Age: 34
End: 2024-10-10

## 2024-10-10 ENCOUNTER — OUTPATIENT (OUTPATIENT)
Dept: OUTPATIENT SERVICES | Facility: HOSPITAL | Age: 34
LOS: 1 days | End: 2024-10-10
Payer: MEDICAID

## 2024-10-10 DIAGNOSIS — Z98.890 OTHER SPECIFIED POSTPROCEDURAL STATES: Chronic | ICD-10-CM

## 2024-10-10 DIAGNOSIS — F60.3 BORDERLINE PERSONALITY DISORDER: ICD-10-CM

## 2024-10-10 DIAGNOSIS — Z90.3 ACQUIRED ABSENCE OF STOMACH [PART OF]: Chronic | ICD-10-CM

## 2024-10-10 PROCEDURE — 90832 PSYTX W PT 30 MINUTES: CPT

## 2024-10-17 ENCOUNTER — APPOINTMENT (OUTPATIENT)
Dept: PSYCHIATRY | Facility: CLINIC | Age: 34
End: 2024-10-17

## 2024-10-24 ENCOUNTER — APPOINTMENT (OUTPATIENT)
Dept: PSYCHIATRY | Facility: CLINIC | Age: 34
End: 2024-10-24

## 2024-11-21 ENCOUNTER — NON-APPOINTMENT (OUTPATIENT)
Age: 34
End: 2024-11-21

## 2024-11-29 ENCOUNTER — APPOINTMENT (OUTPATIENT)
Dept: PSYCHIATRY | Facility: CLINIC | Age: 34
End: 2024-11-29

## 2024-11-29 ENCOUNTER — OUTPATIENT (OUTPATIENT)
Dept: OUTPATIENT SERVICES | Facility: HOSPITAL | Age: 34
LOS: 1 days | End: 2024-11-29
Payer: MEDICAID

## 2024-11-29 DIAGNOSIS — F60.3 BORDERLINE PERSONALITY DISORDER: ICD-10-CM

## 2024-11-29 DIAGNOSIS — Z98.890 OTHER SPECIFIED POSTPROCEDURAL STATES: Chronic | ICD-10-CM

## 2024-11-29 DIAGNOSIS — Z90.3 ACQUIRED ABSENCE OF STOMACH [PART OF]: Chronic | ICD-10-CM

## 2024-11-29 PROCEDURE — 90832 PSYTX W PT 30 MINUTES: CPT

## 2024-11-30 DIAGNOSIS — F60.3 BORDERLINE PERSONALITY DISORDER: ICD-10-CM

## 2024-12-05 ENCOUNTER — OUTPATIENT (OUTPATIENT)
Dept: OUTPATIENT SERVICES | Facility: HOSPITAL | Age: 34
LOS: 1 days | End: 2024-12-05
Payer: MEDICAID

## 2024-12-05 ENCOUNTER — APPOINTMENT (OUTPATIENT)
Dept: PSYCHIATRY | Facility: CLINIC | Age: 34
End: 2024-12-05

## 2024-12-05 DIAGNOSIS — Z90.3 ACQUIRED ABSENCE OF STOMACH [PART OF]: Chronic | ICD-10-CM

## 2024-12-05 PROCEDURE — 90832 PSYTX W PT 30 MINUTES: CPT

## 2024-12-12 ENCOUNTER — OUTPATIENT (OUTPATIENT)
Dept: OUTPATIENT SERVICES | Facility: HOSPITAL | Age: 34
LOS: 1 days | End: 2024-12-12
Payer: MEDICAID

## 2024-12-12 ENCOUNTER — APPOINTMENT (OUTPATIENT)
Dept: PSYCHIATRY | Facility: CLINIC | Age: 34
End: 2024-12-12

## 2024-12-12 DIAGNOSIS — Z90.3 ACQUIRED ABSENCE OF STOMACH [PART OF]: Chronic | ICD-10-CM

## 2024-12-12 DIAGNOSIS — Z98.890 OTHER SPECIFIED POSTPROCEDURAL STATES: Chronic | ICD-10-CM

## 2024-12-12 PROCEDURE — 90832 PSYTX W PT 30 MINUTES: CPT

## 2024-12-13 DIAGNOSIS — F41.9 ANXIETY DISORDER, UNSPECIFIED: ICD-10-CM

## 2024-12-13 PROBLEM — E66.813 CLASS 3 OBESITY: Status: ACTIVE | Noted: 2024-12-13

## 2024-12-16 ENCOUNTER — APPOINTMENT (OUTPATIENT)
Dept: SURGERY | Facility: CLINIC | Age: 34
End: 2024-12-16
Payer: MEDICAID

## 2024-12-16 VITALS
WEIGHT: 270 LBS | TEMPERATURE: 97 F | OXYGEN SATURATION: 99 % | SYSTOLIC BLOOD PRESSURE: 130 MMHG | HEART RATE: 95 BPM | BODY MASS INDEX: 39.54 KG/M2 | HEIGHT: 69.29 IN | DIASTOLIC BLOOD PRESSURE: 78 MMHG

## 2024-12-16 DIAGNOSIS — F32.A ANXIETY DISORDER, UNSPECIFIED: ICD-10-CM

## 2024-12-16 DIAGNOSIS — F41.9 ANXIETY DISORDER, UNSPECIFIED: ICD-10-CM

## 2024-12-16 DIAGNOSIS — J45.909 UNSPECIFIED ASTHMA, UNCOMPLICATED: ICD-10-CM

## 2024-12-16 DIAGNOSIS — E66.813 OBESITY, CLASS 3: ICD-10-CM

## 2024-12-16 DIAGNOSIS — E88.810 METABOLIC SYNDROME: ICD-10-CM

## 2024-12-16 PROCEDURE — 99213 OFFICE O/P EST LOW 20 MIN: CPT

## 2024-12-19 ENCOUNTER — APPOINTMENT (OUTPATIENT)
Dept: PSYCHIATRY | Facility: CLINIC | Age: 34
End: 2024-12-19

## 2024-12-19 ENCOUNTER — NON-APPOINTMENT (OUTPATIENT)
Age: 34
End: 2024-12-19

## 2025-02-07 ENCOUNTER — NON-APPOINTMENT (OUTPATIENT)
Age: 35
End: 2025-02-07

## 2025-02-13 ENCOUNTER — APPOINTMENT (OUTPATIENT)
Dept: PSYCHIATRY | Facility: CLINIC | Age: 35
End: 2025-02-13

## 2025-02-13 ENCOUNTER — OUTPATIENT (OUTPATIENT)
Dept: OUTPATIENT SERVICES | Facility: HOSPITAL | Age: 35
LOS: 1 days | End: 2025-02-13
Payer: MEDICAID

## 2025-02-13 DIAGNOSIS — F32.A ANXIETY DISORDER, UNSPECIFIED: ICD-10-CM

## 2025-02-13 DIAGNOSIS — F41.9 ANXIETY DISORDER, UNSPECIFIED: ICD-10-CM

## 2025-02-13 DIAGNOSIS — Z98.890 OTHER SPECIFIED POSTPROCEDURAL STATES: Chronic | ICD-10-CM

## 2025-02-13 PROCEDURE — 90832 PSYTX W PT 30 MINUTES: CPT

## 2025-02-25 NOTE — ASU PREOP CHECKLIST - AS BP NONINV SITE
left upper arm
For information on Fall & Injury Prevention, visit: [email or web address] OR  [email or web address] OR  https://www.cdc.gov/steadi/patient.html

## 2025-02-27 ENCOUNTER — APPOINTMENT (OUTPATIENT)
Dept: PSYCHIATRY | Facility: CLINIC | Age: 35
End: 2025-02-27

## 2025-03-03 NOTE — ASU PATIENT PROFILE, ADULT - AS SC BRADEN MOISTURE
"NUTRITION FOLLOW UP NOTE    Reason for Visit:  Jin Reynolds is a 70 y.o. male with AML now s/p Allo MRD (sister) PBSCT (T=0 11/21/24).    Transplant c/b by mucositis, CINV and diarrhea     Currently T+102    Pt seen today in infusion      Lab Results   Component Value Date/Time    GLUCOSE 91 02/25/2025 1245     02/25/2025 1245    K 4.3 02/25/2025 1245     02/25/2025 1245    CO2 24 02/25/2025 1245    ANIONGAP 10 02/25/2025 1245    BUN 22 02/25/2025 1245    CREATININE 1.30 02/25/2025 1245    EGFR 59 (L) 02/25/2025 1245    CALCIUM 8.4 (L) 02/25/2025 1245    ALBUMIN 4.0 02/25/2025 1245    ALKPHOS 53 02/25/2025 1245    PROT 6.3 (L) 02/25/2025 1245    AST 14 02/25/2025 1245    BILITOT 0.6 02/25/2025 1245    ALT 11 02/25/2025 1245    MG 1.90 02/25/2025 1245    PHOS 2.3 (L) 12/07/2024 0608     Lab Results   Component Value Date/Time    VITD25 27 (L) 02/05/2024 0807   Taking Vit D 2000IU daily    Lab Results   Component Value Date    WBC 3.8 (L) 03/03/2025    HGB 9.4 (L) 03/03/2025    HCT 26.9 (L) 03/03/2025     (H) 03/03/2025    PLT 67 (L) 03/03/2025       Anthropometrics:  Anthropometrics  Height: 169.1 cm (5' 6.58\")  Weight: 75.2 kg (165 lb 12.6 oz)  BMI (Calculated): 26.3  IBW/kg (Dietitian Calculated): 65.9 kg  Percent of IBW: 114 %    *Wt at transplant admit: (11/15) 86.1 kg  *Wt at first post-transplant visit: (12/9) 80.7 kg     *Wt fluctuating; down 4# x 3 weeks    Wt Readings from Last 10 Encounters:   03/03/25 75.2 kg (165 lb 12.6 oz)   03/03/25 75.2 kg (165 lb 12.8 oz)   02/25/25 75.3 kg (166 lb 0.1 oz)   02/18/25 74.2 kg (163 lb 9.3 oz)   02/11/25 76.9 kg (169 lb 8.5 oz)   02/07/25 74.8 kg (165 lb)   02/07/25 74.8 kg (164 lb 14.5 oz)   02/04/25 73.6 kg (162 lb 4.1 oz)   01/31/25 76.5 kg (168 lb 10.4 oz)   01/31/25 76.5 kg (168 lb 11.2 oz)   12/31/24        71.9 kg  12/24/24        76.7 kg   12/19/24        79.1 kg  12/09/24        80.7 kg--first post transplant visit  11/15/24        86.1 " "kg--at time of transplant  10/31/24        87.5 kg   10/18/24        84.0 kg  09/20/24        86.8 kg  08/29/24        89.2 kg  07/30/24        95.9 kg   06/21/24        87.5 kg         Food And Nutrient Intake:      Appetite present  Portions still 50% baseline; early satiety   Tastes off; ~75% normal; doesn't know how it will taste until he tries it   Still drinking Ensure; switched to HP so doing 2 per day  Mainly drinking water--2 bottles a day  Flashes of nausea; sometimes takes nausea med in am.   Normal BM's   Energy levels \"getting there;\" goes on treadmill every other day for 30 min  Does take naps most days; more fatigued since starting the Cresemba; just started Promacta     Overall feels 8/10  Appetite 8/10    Yesterday  Cereal   Ensure HP   3 pm chili   Ensure HP  Didn't feel like eating dinner  Had candy for snack    This am had Ensure HP           -----------------------  Doing well   Family came over and had a pot luck and strawberry cake for his birthday   Didn't have much of an appetite for 1-2 days but it is back now  Only has fruit in am to take meds; never been a big breakfast eater.   Drinking 3 Ensure Original per day   Fluid intakes good--drinking mainly juices, 3-16 oz bottles of water  No diarrhea or nausea  Has BM every day   Foods tasting better~75%  Energy better; on treadmill walking for 30 minutes every other day   Hasn't taken Mg x 2 days (ran out and didn't order more)    So far today:  Had 2 Ensure's and pretzels in infusion     Noon Normally has soup and sandwich for lunch  Dinner around 7 pm  Ensure drinks around 8-9 am, 2-3 pm, 8 pm        ------------------------------------  Feeling better overall.   Eating well  Still drinking 3 Ensure per day  Appetite better--still with early satiety; portions improving   Tastes getting there; ~90% baselines   Gets feeling of being hungry; sometimes at night   Eating bigger variety of foods   Still drinking liquids--trying to drink more " "water, root beer, venus beer  Slight nausea x 2 episodes--to meds and resolved   Having regular BM's   Energy levels better day by day; went on treadmill for 12 minutes yesterday; does a lot of things around the house.         ---------------------  Appetite much better  Tastes improving but still not 100%  Wanting more meat, valdo beef rather than just soups  Feeling hungry at times; even in middle of night   Still getting full faster but eating a little more than 50% meals  Drinking \"lots\" of liquids, Venus beer, juices and gatorade  Now getting hooked on Ensure strawberry; drinking TID   No nausea  No diarrhea; formed today   Energy improving slightly   WBC and platelets down slightly         ------------------  Eating coming around a little bit   Nausea this am but didn't take anything  Had normal BM (Formed stools)  3 protein shakes and fruit, soup work best (chunky veg soup tastes almost normal)  Trying to eat more solid foods; has to be juicy or have a sauce   Wants to try potato soup next since potatoes in veg soup have been tasting good to him.   Likes Ensure--drinking 3 per day   Takes meds with various drinks  Drinking water well--2-3 bottles per day   Portions still ~1/2 of baseline  Tastes are a little better  Food doesn't taste like he knows it should  Nasal congestion--using NyQuil   Has been holding Bactrim due to kidney function  Slight improvement in energy levels     Pt hasn't had anything to eat as of 4:30pm.      Can make smoothies     Last night had 1/2 chicken (marinated in italian dressing), rice       -----------------------  Feels weak today  Started eating solid foods--wings and potato wedges, fruit   Tastes are a little better; still < half   Eating smaller portions; early satiety   Getting hungry in past few days; craves everything.  No nausea; foods staying down  Still coughing a little bit   Still eating soup everyday  Drinking Ensure 3x/day  Otherwise drinking water and grape pop "   Takes meds with water--rec Ensure   Stools still watery; didn't go in a day   Energy levels come and go; doesn't think he can walk to [facility] to get his prescriptions today  Still napping during the day     Hasn't eaten anything yet today       Rec 3 bottles of water as goal       ---------------  No breakfast yet  Gets hungry--loses interest when eat   Food doesn't taste like he knows it should   Caught respiratory flu last week; to start Nyquil and Mucinex   Feels congested   Still with taste changes   Can do starches in a soup  Vegetable soups   Apple pie didn't taste good   Didn't eat well on Xmas   Still struggling with meat  Does well with fruit  Vit d milkshake   Ensure TID   Take ensure with meds  No nausea  Energy levels still low; worse since got the flu last week   Gagging mucous up; dry heaves  Drinking well--mainly water and Ensure   Stools loose for last few days--2-3 times a day    -------------  Pt seen earlier this week and reported having vomiting with solid foods; had not been taking any anti-emetics.   Told to take Zofran 30 min before meals TID; he was also told he could use Compazine between Zofran if needed.   Since doing so, pt has not had further vomiting; stomach feels a little better and he has been able to keep solid foods down.  Fluid intakes good--has had no issues tolerating.     Has been eating lighter foods in the past few days; still leery of trying meats.   Was able to eat and keep down egg sandwich last night.   Has also been eating bone broth with rice and crax added as well as yogurt.   Now drinking Ensure Original daily.  Tastes remain barrier to eating; did not tolerate trial of lemon hard candies (associated with vomiting).    Still not feeling great, overall. No significant improvements.   Energy remains low; walks slow.     *Pt noted to have been eating better and tolerating PO intakes better after initial hospital discharge.  Intakes have actually digressed in week-10  "days.     Ensure Original:  provides 240 kcals and 9 gm protein each                                                           Nutrition Focused Physical Exam Findings:                          Energy Needs  Calculated Energy Needs Using Equations  Height: 169.1 cm (5' 6.58\")        Nutrition Diagnosis        Pt remains mildly malnourished as appetite/intakes have declined since initial visit.  N/V has improved with consistent use of antiemetics.  Dysgeusia also contributing to decreased oral intakes.         Continues to benefit from use of ONS daily.        Nutrition Interventions/Recommendations   Nutrition Prescription   High Protein, High Calorie  Food Safety          Nutrition Education   Try Ensure Plus vs Ensure Original for additional kcals; for now, plans to increase Ensure Original to TID.  Also suggested taking meds with Ensure and making Ensure into milkshakes.   Spoke with NP, will try Mirtazapine 15 mg to help with appetite/sleep   Eating smaller, more frequent snacks vs meals at this time.  Encourage PO fluid intakes; prefer calorie containing beverages at this time vs water due to decreased intakes.  Discussed increased fluid intakes to help decrease mucous production.   If upper GI symptoms persist, consider start of Budesonide    Coordination of Care   BMT team        Nutrition Monitoring and Evaluation      Will continue to f/up and monitor weight, labs, PO intakes, taste changes and GI symptoms PRN.                      " (4) rarely moist

## 2025-03-07 NOTE — ED ADULT TRIAGE NOTE - CHIEF COMPLAINT QUOTE
throat pain with cough x 5 days, on steroids from urgent care, does not feel like shes improving
31-Jul-2024
12-Mar-2025

## 2025-03-14 ENCOUNTER — EMERGENCY (EMERGENCY)
Facility: HOSPITAL | Age: 35
LOS: 0 days | Discharge: HOME | End: 2025-03-14
Attending: EMERGENCY MEDICINE
Payer: MEDICAID

## 2025-03-14 ENCOUNTER — NON-APPOINTMENT (OUTPATIENT)
Age: 35
End: 2025-03-14

## 2025-03-14 VITALS
SYSTOLIC BLOOD PRESSURE: 123 MMHG | HEART RATE: 75 BPM | OXYGEN SATURATION: 100 % | DIASTOLIC BLOOD PRESSURE: 74 MMHG | RESPIRATION RATE: 18 BRPM | TEMPERATURE: 98 F | WEIGHT: 259.93 LBS

## 2025-03-14 DIAGNOSIS — R53.83 OTHER FATIGUE: ICD-10-CM

## 2025-03-14 DIAGNOSIS — Z88.0 ALLERGY STATUS TO PENICILLIN: ICD-10-CM

## 2025-03-14 DIAGNOSIS — R31.9 HEMATURIA, UNSPECIFIED: ICD-10-CM

## 2025-03-14 DIAGNOSIS — Z98.84 BARIATRIC SURGERY STATUS: ICD-10-CM

## 2025-03-14 DIAGNOSIS — Z90.3 ACQUIRED ABSENCE OF STOMACH [PART OF]: Chronic | ICD-10-CM

## 2025-03-14 DIAGNOSIS — Z91.018 ALLERGY TO OTHER FOODS: ICD-10-CM

## 2025-03-14 DIAGNOSIS — Z98.890 OTHER SPECIFIED POSTPROCEDURAL STATES: Chronic | ICD-10-CM

## 2025-03-14 DIAGNOSIS — R06.02 SHORTNESS OF BREATH: ICD-10-CM

## 2025-03-14 DIAGNOSIS — D64.9 ANEMIA, UNSPECIFIED: ICD-10-CM

## 2025-03-14 LAB
ALBUMIN SERPL ELPH-MCNC: 4.2 G/DL — SIGNIFICANT CHANGE UP (ref 3.5–5.2)
ALP SERPL-CCNC: 94 U/L — SIGNIFICANT CHANGE UP (ref 30–115)
ALT FLD-CCNC: 26 U/L — SIGNIFICANT CHANGE UP (ref 0–41)
ANION GAP SERPL CALC-SCNC: 10 MMOL/L — SIGNIFICANT CHANGE UP (ref 7–14)
APPEARANCE UR: CLEAR — SIGNIFICANT CHANGE UP
AST SERPL-CCNC: 23 U/L — SIGNIFICANT CHANGE UP (ref 0–41)
BASOPHILS # BLD AUTO: 0.05 K/UL — SIGNIFICANT CHANGE UP (ref 0–0.2)
BASOPHILS NFR BLD AUTO: 0.8 % — SIGNIFICANT CHANGE UP (ref 0–1)
BILIRUB SERPL-MCNC: 0.2 MG/DL — SIGNIFICANT CHANGE UP (ref 0.2–1.2)
BILIRUB UR-MCNC: NEGATIVE — SIGNIFICANT CHANGE UP
BLD GP AB SCN SERPL QL: SIGNIFICANT CHANGE UP
BUN SERPL-MCNC: 8 MG/DL — LOW (ref 10–20)
CALCIUM SERPL-MCNC: 9.5 MG/DL — SIGNIFICANT CHANGE UP (ref 8.4–10.5)
CHLORIDE SERPL-SCNC: 104 MMOL/L — SIGNIFICANT CHANGE UP (ref 98–110)
CO2 SERPL-SCNC: 23 MMOL/L — SIGNIFICANT CHANGE UP (ref 17–32)
COLOR SPEC: YELLOW — SIGNIFICANT CHANGE UP
CREAT SERPL-MCNC: 0.8 MG/DL — SIGNIFICANT CHANGE UP (ref 0.7–1.5)
DIFF PNL FLD: NEGATIVE — SIGNIFICANT CHANGE UP
EGFR: 99 ML/MIN/1.73M2 — SIGNIFICANT CHANGE UP
EOSINOPHIL # BLD AUTO: 0.06 K/UL — SIGNIFICANT CHANGE UP (ref 0–0.7)
EOSINOPHIL NFR BLD AUTO: 0.9 % — SIGNIFICANT CHANGE UP (ref 0–8)
GLUCOSE SERPL-MCNC: 100 MG/DL — HIGH (ref 70–99)
GLUCOSE UR QL: NEGATIVE MG/DL — SIGNIFICANT CHANGE UP
HCG SERPL QL: NEGATIVE — SIGNIFICANT CHANGE UP
HCT VFR BLD CALC: 26.6 % — LOW (ref 37–47)
HGB BLD-MCNC: 7.7 G/DL — LOW (ref 12–16)
IMM GRANULOCYTES NFR BLD AUTO: 0.3 % — SIGNIFICANT CHANGE UP (ref 0.1–0.3)
KETONES UR-MCNC: NEGATIVE MG/DL — SIGNIFICANT CHANGE UP
LEUKOCYTE ESTERASE UR-ACNC: NEGATIVE — SIGNIFICANT CHANGE UP
LIDOCAIN IGE QN: 50 U/L — SIGNIFICANT CHANGE UP (ref 7–60)
LYMPHOCYTES # BLD AUTO: 2.16 K/UL — SIGNIFICANT CHANGE UP (ref 1.2–3.4)
LYMPHOCYTES # BLD AUTO: 33.8 % — SIGNIFICANT CHANGE UP (ref 20.5–51.1)
MAGNESIUM SERPL-MCNC: 2.2 MG/DL — SIGNIFICANT CHANGE UP (ref 1.8–2.4)
MCHC RBC-ENTMCNC: 19.4 PG — LOW (ref 27–31)
MCHC RBC-ENTMCNC: 28.9 G/DL — LOW (ref 32–37)
MCV RBC AUTO: 67 FL — LOW (ref 81–99)
MONOCYTES # BLD AUTO: 0.49 K/UL — SIGNIFICANT CHANGE UP (ref 0.1–0.6)
MONOCYTES NFR BLD AUTO: 7.7 % — SIGNIFICANT CHANGE UP (ref 1.7–9.3)
NEUTROPHILS # BLD AUTO: 3.61 K/UL — SIGNIFICANT CHANGE UP (ref 1.4–6.5)
NEUTROPHILS NFR BLD AUTO: 56.5 % — SIGNIFICANT CHANGE UP (ref 42.2–75.2)
NITRITE UR-MCNC: NEGATIVE — SIGNIFICANT CHANGE UP
NRBC BLD AUTO-RTO: 0 /100 WBCS — SIGNIFICANT CHANGE UP (ref 0–0)
PH UR: 6 — SIGNIFICANT CHANGE UP (ref 5–8)
PHOSPHATE SERPL-MCNC: 3.7 MG/DL — SIGNIFICANT CHANGE UP (ref 2.1–4.9)
PLATELET # BLD AUTO: 336 K/UL — SIGNIFICANT CHANGE UP (ref 130–400)
PMV BLD: 9.8 FL — SIGNIFICANT CHANGE UP (ref 7.4–10.4)
POTASSIUM SERPL-MCNC: 4.2 MMOL/L — SIGNIFICANT CHANGE UP (ref 3.5–5)
POTASSIUM SERPL-SCNC: 4.2 MMOL/L — SIGNIFICANT CHANGE UP (ref 3.5–5)
PROT SERPL-MCNC: 7.5 G/DL — SIGNIFICANT CHANGE UP (ref 6–8)
PROT UR-MCNC: NEGATIVE MG/DL — SIGNIFICANT CHANGE UP
RBC # BLD: 3.97 M/UL — LOW (ref 4.2–5.4)
RBC # FLD: 19.2 % — HIGH (ref 11.5–14.5)
SODIUM SERPL-SCNC: 137 MMOL/L — SIGNIFICANT CHANGE UP (ref 135–146)
SP GR SPEC: 1.01 — SIGNIFICANT CHANGE UP (ref 1–1.03)
UROBILINOGEN FLD QL: 0.2 MG/DL — SIGNIFICANT CHANGE UP (ref 0.2–1)
WBC # BLD: 6.39 K/UL — SIGNIFICANT CHANGE UP (ref 4.8–10.8)
WBC # FLD AUTO: 6.39 K/UL — SIGNIFICANT CHANGE UP (ref 4.8–10.8)

## 2025-03-14 PROCEDURE — 85025 COMPLETE CBC W/AUTO DIFF WBC: CPT

## 2025-03-14 PROCEDURE — 84100 ASSAY OF PHOSPHORUS: CPT

## 2025-03-14 PROCEDURE — 86850 RBC ANTIBODY SCREEN: CPT

## 2025-03-14 PROCEDURE — 83735 ASSAY OF MAGNESIUM: CPT

## 2025-03-14 PROCEDURE — 36000 PLACE NEEDLE IN VEIN: CPT

## 2025-03-14 PROCEDURE — 80053 COMPREHEN METABOLIC PANEL: CPT

## 2025-03-14 PROCEDURE — 93010 ELECTROCARDIOGRAM REPORT: CPT

## 2025-03-14 PROCEDURE — 99283 EMERGENCY DEPT VISIT LOW MDM: CPT | Mod: 25

## 2025-03-14 PROCEDURE — 86901 BLOOD TYPING SEROLOGIC RH(D): CPT

## 2025-03-14 PROCEDURE — 99285 EMERGENCY DEPT VISIT HI MDM: CPT

## 2025-03-14 PROCEDURE — 81003 URINALYSIS AUTO W/O SCOPE: CPT

## 2025-03-14 PROCEDURE — 93005 ELECTROCARDIOGRAM TRACING: CPT

## 2025-03-14 PROCEDURE — 84703 CHORIONIC GONADOTROPIN ASSAY: CPT

## 2025-03-14 PROCEDURE — 86900 BLOOD TYPING SEROLOGIC ABO: CPT

## 2025-03-14 PROCEDURE — 83690 ASSAY OF LIPASE: CPT

## 2025-03-14 PROCEDURE — 36415 COLL VENOUS BLD VENIPUNCTURE: CPT

## 2025-03-14 NOTE — ED PROVIDER NOTE - PATIENT PORTAL LINK FT
You can access the FollowMyHealth Patient Portal offered by [facility] by registering at the following website: [email or web address]. By joining [employer]’s FollowMyHealth portal, you will also be able to view your health information using other applications (apps) compatible with our system.

## 2025-03-14 NOTE — ED PROVIDER NOTE - CARE PROVIDER_API CALL
Belen Maciel)  Hematology  [address] [place] [address]  Phone: [phone]  Fax: [phone]  Follow Up Time:

## 2025-03-14 NOTE — ED ADULT NURSE NOTE - NSICDXPASTMEDICALHX_GEN_ALL_CORE_FT
PAST MEDICAL HISTORY:  Asthma     Encounter for surveillance of transdermal patch hormonal contraceptive device     GERD (gastroesophageal reflux disease)     Obesity BMI 50    RYLAND on CPAP NOLONGER SINCE GASTRIC SLEEVE    
,DirectAddress_Unknown,[email or web address]

## 2025-03-14 NOTE — ED PROVIDER NOTE - OBJECTIVE STATEMENT
pt s/p gastric bypass last year without proper f/u sent to ED for eval of recent anemia on labs. reports has had intermittent hematuria, sob and fatigue for the last several months. Denies fever/chill/HA/dizziness/chest pain/palpitation/sob/abd pain/n/v/d/ black stool/bloody stool/urinary sxs

## 2025-03-14 NOTE — ED PROVIDER NOTE - ATTENDING APP SHARED VISIT CONTRIBUTION OF CARE
Pt comes in after routine bloodwork at PMD office revealed Hgb of 7.1 yesterday.  POt is s/p gastric bypass but doesn't take supplements or follow up regularly.  Notes months of intermittent hematuria and fatigue and dyspnea on exertion.  no CP or syncope.    Exam:  pale skin, RRR, CTAB, soft NT abdomen, NAD  Plan: labs, transfuse as indicated    Number of diagnoses or management options:  [x] Referral or consultation made: hematology    Complexity of data reviewed:  [x] Decision made to obtain old record(s) or additional history from the family, caretaker, or other source  [x] Laboratory test(s) ordered and/or reviewed  [ ] Independent interpretation of EKG by Dr. Arnoldo Stock:  [ ] Independent interpretation of Radiology by Dr. Arnoldo Stock:   [ ] I, Arnoldo Stock, had a discussion with    Risk (Management Options):  [ ] High: emergency surgery; monitored drug therapy; controlled parenteral therapy; decision for DNR Pt comes in after routine bloodwork at PMD office revealed Hgb of 7.1 yesterday.  POt is s/p gastric bypass but doesn't take supplements or follow up regularly.  Notes months of intermittent hematuria and fatigue and dyspnea on exertion.  no CP or syncope.    Exam:  pale skin, RRR, CTAB, soft NT abdomen, NAD  Plan: labs, transfuse as indicated    Number of diagnoses or management options:  [x] Referral or consultation made: hematology    Complexity of data reviewed:  [x] Decision made to obtain old record(s) or additional history from the family, caretaker, or other source  [x] Laboratory test(s) ordered and/or reviewed  [x] Independent interpretation of EKG by Dr. Arnoldo Stock: NSR @ 77  [ ] Independent interpretation of Radiology by Dr. Arnoldo Stock:   [ ] I, Arnoldo Stock, had a discussion with    Risk (Management Options):  [ ] High: emergency surgery; monitored drug therapy; controlled parenteral therapy; decision for DNR

## 2025-03-18 ENCOUNTER — EMERGENCY (EMERGENCY)
Facility: HOSPITAL | Age: 35
LOS: 0 days | Discharge: HOME | End: 2025-03-18
Attending: EMERGENCY MEDICINE
Payer: MEDICAID

## 2025-03-18 ENCOUNTER — NON-APPOINTMENT (OUTPATIENT)
Age: 35
End: 2025-03-18

## 2025-03-18 VITALS
OXYGEN SATURATION: 100 % | WEIGHT: 270.07 LBS | HEART RATE: 78 BPM | TEMPERATURE: 98 F | DIASTOLIC BLOOD PRESSURE: 70 MMHG | SYSTOLIC BLOOD PRESSURE: 120 MMHG | RESPIRATION RATE: 18 BRPM

## 2025-03-18 VITALS
SYSTOLIC BLOOD PRESSURE: 117 MMHG | OXYGEN SATURATION: 100 % | TEMPERATURE: 98 F | RESPIRATION RATE: 18 BRPM | DIASTOLIC BLOOD PRESSURE: 76 MMHG | HEART RATE: 71 BPM

## 2025-03-18 DIAGNOSIS — D64.9 ANEMIA, UNSPECIFIED: ICD-10-CM

## 2025-03-18 DIAGNOSIS — Z90.3 ACQUIRED ABSENCE OF STOMACH [PART OF]: Chronic | ICD-10-CM

## 2025-03-18 DIAGNOSIS — Z91.018 ALLERGY TO OTHER FOODS: ICD-10-CM

## 2025-03-18 DIAGNOSIS — Z88.0 ALLERGY STATUS TO PENICILLIN: ICD-10-CM

## 2025-03-18 DIAGNOSIS — Z98.890 OTHER SPECIFIED POSTPROCEDURAL STATES: Chronic | ICD-10-CM

## 2025-03-18 PROBLEM — E53.8 DEFICIENCY OF VITAMIN B12: Status: ACTIVE | Noted: 2025-03-18

## 2025-03-18 LAB
ALBUMIN SERPL ELPH-MCNC: 4 G/DL — SIGNIFICANT CHANGE UP (ref 3.5–5.2)
ALP SERPL-CCNC: 88 U/L — SIGNIFICANT CHANGE UP (ref 30–115)
ALT FLD-CCNC: 23 U/L — SIGNIFICANT CHANGE UP (ref 0–41)
ANION GAP SERPL CALC-SCNC: 7 MMOL/L — SIGNIFICANT CHANGE UP (ref 7–14)
APPEARANCE UR: CLEAR — SIGNIFICANT CHANGE UP
AST SERPL-CCNC: 23 U/L — SIGNIFICANT CHANGE UP (ref 0–41)
BASOPHILS # BLD AUTO: 0 K/UL — SIGNIFICANT CHANGE UP (ref 0–0.2)
BASOPHILS NFR BLD AUTO: 0 % — SIGNIFICANT CHANGE UP (ref 0–1)
BILIRUB SERPL-MCNC: 0.2 MG/DL — SIGNIFICANT CHANGE UP (ref 0.2–1.2)
BILIRUB UR-MCNC: NEGATIVE — SIGNIFICANT CHANGE UP
BUN SERPL-MCNC: 8 MG/DL — LOW (ref 10–20)
CALCIUM SERPL-MCNC: 8.4 MG/DL — SIGNIFICANT CHANGE UP (ref 8.4–10.5)
CHLORIDE SERPL-SCNC: 107 MMOL/L — SIGNIFICANT CHANGE UP (ref 98–110)
CO2 SERPL-SCNC: 24 MMOL/L — SIGNIFICANT CHANGE UP (ref 17–32)
COLOR SPEC: YELLOW — SIGNIFICANT CHANGE UP
CREAT SERPL-MCNC: 0.5 MG/DL — LOW (ref 0.7–1.5)
DIFF PNL FLD: NEGATIVE — SIGNIFICANT CHANGE UP
EGFR: 126 ML/MIN/1.73M2 — SIGNIFICANT CHANGE UP
EGFR: 126 ML/MIN/1.73M2 — SIGNIFICANT CHANGE UP
EOSINOPHIL # BLD AUTO: 0.09 K/UL — SIGNIFICANT CHANGE UP (ref 0–0.7)
EOSINOPHIL NFR BLD AUTO: 1.8 % — SIGNIFICANT CHANGE UP (ref 0–8)
GLUCOSE SERPL-MCNC: 87 MG/DL — SIGNIFICANT CHANGE UP (ref 70–99)
GLUCOSE UR QL: NEGATIVE MG/DL — SIGNIFICANT CHANGE UP
HCT VFR BLD CALC: 26.9 % — LOW (ref 37–47)
HGB BLD-MCNC: 7.7 G/DL — LOW (ref 12–16)
KETONES UR-MCNC: NEGATIVE MG/DL — SIGNIFICANT CHANGE UP
LEUKOCYTE ESTERASE UR-ACNC: NEGATIVE — SIGNIFICANT CHANGE UP
LYMPHOCYTES # BLD AUTO: 1.6 K/UL — SIGNIFICANT CHANGE UP (ref 1.2–3.4)
LYMPHOCYTES # BLD AUTO: 31.6 % — SIGNIFICANT CHANGE UP (ref 20.5–51.1)
MCHC RBC-ENTMCNC: 19.3 PG — LOW (ref 27–31)
MCHC RBC-ENTMCNC: 28.6 G/DL — LOW (ref 32–37)
MCV RBC AUTO: 67.4 FL — LOW (ref 81–99)
MONOCYTES # BLD AUTO: 0.31 K/UL — SIGNIFICANT CHANGE UP (ref 0.1–0.6)
MONOCYTES NFR BLD AUTO: 6.1 % — SIGNIFICANT CHANGE UP (ref 1.7–9.3)
NEUTROPHILS # BLD AUTO: 3.02 K/UL — SIGNIFICANT CHANGE UP (ref 1.4–6.5)
NEUTROPHILS NFR BLD AUTO: 59.6 % — SIGNIFICANT CHANGE UP (ref 42.2–75.2)
NITRITE UR-MCNC: NEGATIVE — SIGNIFICANT CHANGE UP
PH UR: 5.5 — SIGNIFICANT CHANGE UP (ref 5–8)
PLATELET # BLD AUTO: 326 K/UL — SIGNIFICANT CHANGE UP (ref 130–400)
PMV BLD: 10.2 FL — SIGNIFICANT CHANGE UP (ref 7.4–10.4)
POTASSIUM SERPL-MCNC: 4.1 MMOL/L — SIGNIFICANT CHANGE UP (ref 3.5–5)
POTASSIUM SERPL-SCNC: 4.1 MMOL/L — SIGNIFICANT CHANGE UP (ref 3.5–5)
PROT SERPL-MCNC: 7.1 G/DL — SIGNIFICANT CHANGE UP (ref 6–8)
PROT UR-MCNC: SIGNIFICANT CHANGE UP MG/DL
RBC # BLD: 3.99 M/UL — LOW (ref 4.2–5.4)
RBC # FLD: 19.3 % — HIGH (ref 11.5–14.5)
SODIUM SERPL-SCNC: 138 MMOL/L — SIGNIFICANT CHANGE UP (ref 135–146)
SP GR SPEC: 1.02 — SIGNIFICANT CHANGE UP (ref 1–1.03)
UROBILINOGEN FLD QL: 1 MG/DL — SIGNIFICANT CHANGE UP (ref 0.2–1)
WBC # BLD: 5.06 K/UL — SIGNIFICANT CHANGE UP (ref 4.8–10.8)
WBC # FLD AUTO: 5.06 K/UL — SIGNIFICANT CHANGE UP (ref 4.8–10.8)

## 2025-03-18 PROCEDURE — 85025 COMPLETE CBC W/AUTO DIFF WBC: CPT

## 2025-03-18 PROCEDURE — 86900 BLOOD TYPING SEROLOGIC ABO: CPT

## 2025-03-18 PROCEDURE — 99285 EMERGENCY DEPT VISIT HI MDM: CPT

## 2025-03-18 PROCEDURE — P9016: CPT

## 2025-03-18 PROCEDURE — 80053 COMPREHEN METABOLIC PANEL: CPT

## 2025-03-18 PROCEDURE — 86922 COMPATIBILITY TEST ANTIGLOB: CPT

## 2025-03-18 PROCEDURE — 36415 COLL VENOUS BLD VENIPUNCTURE: CPT

## 2025-03-18 PROCEDURE — 93005 ELECTROCARDIOGRAM TRACING: CPT

## 2025-03-18 PROCEDURE — 86901 BLOOD TYPING SEROLOGIC RH(D): CPT

## 2025-03-18 PROCEDURE — 99285 EMERGENCY DEPT VISIT HI MDM: CPT | Mod: 25

## 2025-03-18 PROCEDURE — 86850 RBC ANTIBODY SCREEN: CPT

## 2025-03-18 PROCEDURE — 96372 THER/PROPH/DIAG INJ SC/IM: CPT | Mod: XU

## 2025-03-18 PROCEDURE — 93010 ELECTROCARDIOGRAM REPORT: CPT

## 2025-03-18 PROCEDURE — 82962 GLUCOSE BLOOD TEST: CPT

## 2025-03-18 PROCEDURE — 96374 THER/PROPH/DIAG INJ IV PUSH: CPT

## 2025-03-18 PROCEDURE — 81003 URINALYSIS AUTO W/O SCOPE: CPT

## 2025-03-18 PROCEDURE — 36430 TRANSFUSION BLD/BLD COMPNT: CPT

## 2025-03-18 RX ORDER — CYANOCOBALAMIN 1000 UG/ML
1000 INJECTION INTRAMUSCULAR; SUBCUTANEOUS ONCE
Refills: 0 | Status: COMPLETED | OUTPATIENT
Start: 2025-03-18 | End: 2025-03-18

## 2025-03-18 RX ORDER — GREEN TEA/HOODIA GORDONII 315-12.5MG
500 CAPSULE ORAL DAILY
Qty: 30 | Refills: 1 | Status: ACTIVE | COMMUNITY
Start: 2025-03-18 | End: 1900-01-01

## 2025-03-18 RX ORDER — ERGOCALCIFEROL 1.25 MG/1
1.25 MG CAPSULE, LIQUID FILLED ORAL
Qty: 16 | Refills: 0 | Status: ACTIVE | COMMUNITY
Start: 2025-03-18 | End: 1900-01-01

## 2025-03-18 RX ADMIN — Medication 1000 MILLILITER(S): at 14:47

## 2025-03-18 RX ADMIN — Medication 100 MILLIGRAM(S): at 14:47

## 2025-03-18 RX ADMIN — CYANOCOBALAMIN 1000 MICROGRAM(S): 1000 INJECTION INTRAMUSCULAR; SUBCUTANEOUS at 14:55

## 2025-03-18 NOTE — ED ADULT NURSE NOTE - NSFALLRISKINTERV_ED_ALL_ED

## 2025-03-18 NOTE — ED PROVIDER NOTE - CARE PROVIDER_API CALL
Yani Boucher  Surgery  [address] [place] [address], [address] [place] [address]  Phone: [phone]  Fax: [phone]  Follow Up Time:    29-Nov-2017 20:37

## 2025-03-18 NOTE — ED PROVIDER NOTE - OBJECTIVE STATEMENT
Patient is a 35 year old female with pmhx of gastric bypass in 2024 presents for evaluation of low hgb on outpatient labs drawn last week. She presented to [facility] and was not transfused at that time. She called her Surgeon's office and was referred to return to ED for transfusion.

## 2025-03-18 NOTE — ED PROVIDER NOTE - PROGRESS NOTE DETAILS
KA - Discussed with NP at bariatric surgery office and dr saha. Requested 1 PRBC transfusion due to symptomatic anemia which ED is agreeable and had ordered. Patient consented and PRBC ordered. Additional thiamine ordered. Pt signed out to Dr. Garcia, follow-up transfusion, reassess and dispo. KA - transfusion completed. referral for Heme given. Will follow up with dr saha.

## 2025-03-18 NOTE — ED PROVIDER NOTE - NSFOLLOWUPINSTRUCTIONS_ED_ALL_ED_FT
Follow up with Hematology and Dr Boucher    Our Emergency Department Referral Coordinators will be reaching out to you in the next 24-48 hours from 9:00am to 5:00pm with a follow up appointment. Please expect a phone call from the hospital in that time frame. If you do not receive a call or if you have any questions or concerns, you can reach them at   [phone]    Anemia    Anemia is a condition in which the concentration of red blood cells or hemoglobin in the blood is below normal. Hemoglobin is a substance in red blood cells that carries oxygen to the tissues of the body. Anemia results in not enough oxygen reaching these tissues which can cause symptoms such as weakness, dizziness/lightheadedness, shortness of breath, chest pain, paleness, or nausea.    SEEK IMMEDIATE MEDICAL CARE IF YOU HAVE THE FOLLOWING SYMPTOMS: extreme weakness/chest pain/shortness of breath, black or bloody stools, vomiting blood, fainting, fever, or any signs of dehydration.

## 2025-03-18 NOTE — ED PROVIDER NOTE - CLINICAL SUMMARY MEDICAL DECISION MAKING FREE TEXT BOX
Patient presented with low Hb outpatient as documented, follows up with Dr. Boucher from surgery. Otherwise afebrile, HD stable, no active bleeding on exam, well appearing. Obtained labs which showed (+) Hb 7.7 but otherwise were grossly unremarkable including no significant leukocytosis, signs of dehydration/ETHEL, transaminitis or significant electrolyte abnormalities. UA negative for infection or bleeding. EKG unremarkable. Dr. Boucher was consulted and evaluated patient in the ED - recommending 1U PRBCs and then can be discharged. Patient received 1U in the ED without complication. Given the above, will discharge home with outpatient follow up. Patient agreeable with plan. Agrees to return to ED for any new or worsening symptoms.

## 2025-03-18 NOTE — ED PROVIDER NOTE - PATIENT PORTAL LINK FT
You can access the FollowMyHealth Patient Portal offered by [facility] by registering at the following website: [email or web address]. By joining [employer]’s FollowMyHealth portal, you will also be able to view your health information using other applications (apps) compatible with our system.

## 2025-03-20 ENCOUNTER — NON-APPOINTMENT (OUTPATIENT)
Age: 35
End: 2025-03-20

## 2025-03-21 DIAGNOSIS — E61.1 IRON DEFICIENCY: ICD-10-CM

## 2025-03-27 ENCOUNTER — OUTPATIENT (OUTPATIENT)
Dept: OUTPATIENT SERVICES | Facility: HOSPITAL | Age: 35
LOS: 1 days | End: 2025-03-27
Payer: MEDICAID

## 2025-03-27 ENCOUNTER — NON-APPOINTMENT (OUTPATIENT)
Age: 35
End: 2025-03-27

## 2025-03-27 ENCOUNTER — APPOINTMENT (OUTPATIENT)
Age: 35
End: 2025-03-27
Payer: MEDICAID

## 2025-03-27 VITALS
TEMPERATURE: 98.3 F | SYSTOLIC BLOOD PRESSURE: 108 MMHG | RESPIRATION RATE: 16 BRPM | HEART RATE: 69 BPM | DIASTOLIC BLOOD PRESSURE: 50 MMHG | HEIGHT: 69.29 IN | WEIGHT: 271 LBS | BODY MASS INDEX: 39.68 KG/M2 | OXYGEN SATURATION: 99 %

## 2025-03-27 DIAGNOSIS — D50.9 IRON DEFICIENCY ANEMIA, UNSPECIFIED: ICD-10-CM

## 2025-03-27 DIAGNOSIS — Z90.3 ACQUIRED ABSENCE OF STOMACH [PART OF]: Chronic | ICD-10-CM

## 2025-03-27 DIAGNOSIS — D64.9 ANEMIA, UNSPECIFIED: ICD-10-CM

## 2025-03-27 DIAGNOSIS — Z98.890 OTHER SPECIFIED POSTPROCEDURAL STATES: Chronic | ICD-10-CM

## 2025-03-27 DIAGNOSIS — E53.8 DEFICIENCY OF OTHER SPECIFIED B GROUP VITAMINS: ICD-10-CM

## 2025-03-27 DIAGNOSIS — Z79.899 OTHER LONG TERM (CURRENT) DRUG THERAPY: ICD-10-CM

## 2025-03-27 PROCEDURE — 99204 OFFICE O/P NEW MOD 45 MIN: CPT

## 2025-03-28 DIAGNOSIS — D64.9 ANEMIA, UNSPECIFIED: ICD-10-CM

## 2025-03-31 ENCOUNTER — NON-APPOINTMENT (OUTPATIENT)
Age: 35
End: 2025-03-31

## 2025-03-31 ENCOUNTER — APPOINTMENT (OUTPATIENT)
Dept: PSYCHIATRY | Facility: CLINIC | Age: 35
End: 2025-03-31

## 2025-04-01 ENCOUNTER — NON-APPOINTMENT (OUTPATIENT)
Age: 35
End: 2025-04-01

## 2025-04-01 ENCOUNTER — APPOINTMENT (OUTPATIENT)
Age: 35
End: 2025-04-01

## 2025-04-02 ENCOUNTER — NON-APPOINTMENT (OUTPATIENT)
Age: 35
End: 2025-04-02

## 2025-04-10 ENCOUNTER — OUTPATIENT (OUTPATIENT)
Dept: OUTPATIENT SERVICES | Facility: HOSPITAL | Age: 35
LOS: 1 days | End: 2025-04-10
Payer: MEDICAID

## 2025-04-10 ENCOUNTER — APPOINTMENT (OUTPATIENT)
Age: 35
End: 2025-04-10

## 2025-04-10 VITALS
HEART RATE: 73 BPM | OXYGEN SATURATION: 99 % | SYSTOLIC BLOOD PRESSURE: 120 MMHG | DIASTOLIC BLOOD PRESSURE: 74 MMHG | TEMPERATURE: 98 F

## 2025-04-10 DIAGNOSIS — Z98.890 OTHER SPECIFIED POSTPROCEDURAL STATES: Chronic | ICD-10-CM

## 2025-04-10 DIAGNOSIS — Z90.3 ACQUIRED ABSENCE OF STOMACH [PART OF]: Chronic | ICD-10-CM

## 2025-04-10 DIAGNOSIS — D64.9 ANEMIA, UNSPECIFIED: ICD-10-CM

## 2025-04-10 PROCEDURE — 96372 THER/PROPH/DIAG INJ SC/IM: CPT

## 2025-04-10 PROCEDURE — 96365 THER/PROPH/DIAG IV INF INIT: CPT

## 2025-04-10 RX ORDER — CYANOCOBALAMIN 1000 UG/ML
1000 INJECTION INTRAMUSCULAR; SUBCUTANEOUS ONCE
Refills: 0 | Status: COMPLETED | OUTPATIENT
Start: 2025-04-10 | End: 2025-04-10

## 2025-04-10 RX ORDER — IRON SUCROSE 20 MG/ML
200 INJECTION, SOLUTION INTRAVENOUS ONCE
Refills: 0 | Status: COMPLETED | OUTPATIENT
Start: 2025-04-10 | End: 2025-04-10

## 2025-04-10 RX ADMIN — IRON SUCROSE 200 MILLIGRAM(S): 20 INJECTION, SOLUTION INTRAVENOUS at 18:05

## 2025-04-10 RX ADMIN — CYANOCOBALAMIN 1000 MICROGRAM(S): 1000 INJECTION INTRAMUSCULAR; SUBCUTANEOUS at 17:33

## 2025-04-10 RX ADMIN — IRON SUCROSE 200 MILLIGRAM(S): 20 INJECTION, SOLUTION INTRAVENOUS at 17:35

## 2025-04-11 DIAGNOSIS — D64.9 ANEMIA, UNSPECIFIED: ICD-10-CM

## 2025-04-15 ENCOUNTER — NON-APPOINTMENT (OUTPATIENT)
Age: 35
End: 2025-04-15

## 2025-04-15 ENCOUNTER — APPOINTMENT (OUTPATIENT)
Age: 35
End: 2025-04-15

## 2025-04-15 ENCOUNTER — APPOINTMENT (OUTPATIENT)
Dept: PSYCHIATRY | Facility: CLINIC | Age: 35
End: 2025-04-15

## 2025-04-18 ENCOUNTER — NON-APPOINTMENT (OUTPATIENT)
Age: 35
End: 2025-04-18

## 2025-04-24 ENCOUNTER — APPOINTMENT (OUTPATIENT)
Age: 35
End: 2025-04-24

## 2025-04-24 ENCOUNTER — APPOINTMENT (OUTPATIENT)
Dept: SURGERY | Facility: CLINIC | Age: 35
End: 2025-04-24

## 2025-04-24 ENCOUNTER — OUTPATIENT (OUTPATIENT)
Dept: OUTPATIENT SERVICES | Facility: HOSPITAL | Age: 35
LOS: 1 days | End: 2025-04-24
Payer: MEDICAID

## 2025-04-24 VITALS
SYSTOLIC BLOOD PRESSURE: 116 MMHG | BODY MASS INDEX: 37.78 KG/M2 | HEART RATE: 88 BPM | WEIGHT: 258 LBS | HEIGHT: 69.29 IN | OXYGEN SATURATION: 99 % | DIASTOLIC BLOOD PRESSURE: 62 MMHG

## 2025-04-24 VITALS — SYSTOLIC BLOOD PRESSURE: 117 MMHG | HEART RATE: 100 BPM | TEMPERATURE: 98 F | DIASTOLIC BLOOD PRESSURE: 70 MMHG

## 2025-04-24 DIAGNOSIS — Z90.3 ACQUIRED ABSENCE OF STOMACH [PART OF]: Chronic | ICD-10-CM

## 2025-04-24 DIAGNOSIS — D64.9 ANEMIA, UNSPECIFIED: ICD-10-CM

## 2025-04-24 DIAGNOSIS — E66.812 OBESITY, CLASS 2: ICD-10-CM

## 2025-04-24 DIAGNOSIS — Z98.890 OTHER SPECIFIED POSTPROCEDURAL STATES: Chronic | ICD-10-CM

## 2025-04-24 PROCEDURE — 96372 THER/PROPH/DIAG INJ SC/IM: CPT

## 2025-04-24 PROCEDURE — G2211 COMPLEX E/M VISIT ADD ON: CPT | Mod: NC

## 2025-04-24 PROCEDURE — 96365 THER/PROPH/DIAG IV INF INIT: CPT

## 2025-04-24 PROCEDURE — 99205 OFFICE O/P NEW HI 60 MIN: CPT

## 2025-04-24 RX ORDER — SEMAGLUTIDE 0.5 MG/.5ML
0.5 INJECTION, SOLUTION SUBCUTANEOUS
Qty: 1 | Refills: 2 | Status: ACTIVE | COMMUNITY
Start: 2025-04-24 | End: 1900-01-01

## 2025-04-24 RX ORDER — SEMAGLUTIDE 0.25 MG/.5ML
0.25 INJECTION, SOLUTION SUBCUTANEOUS
Qty: 1 | Refills: 0 | Status: ACTIVE | COMMUNITY
Start: 2025-04-24 | End: 1900-01-01

## 2025-04-24 RX ORDER — IRON SUCROSE 20 MG/ML
200 INJECTION, SOLUTION INTRAVENOUS ONCE
Refills: 0 | Status: COMPLETED | OUTPATIENT
Start: 2025-04-24 | End: 2025-04-24

## 2025-04-24 RX ORDER — CYANOCOBALAMIN 1000 UG/ML
1000 INJECTION INTRAMUSCULAR; SUBCUTANEOUS ONCE
Refills: 0 | Status: COMPLETED | OUTPATIENT
Start: 2025-04-24 | End: 2025-04-24

## 2025-04-24 RX ADMIN — IRON SUCROSE 200 MILLIGRAM(S): 20 INJECTION, SOLUTION INTRAVENOUS at 10:15

## 2025-04-24 RX ADMIN — CYANOCOBALAMIN 1000 MICROGRAM(S): 1000 INJECTION INTRAMUSCULAR; SUBCUTANEOUS at 10:13

## 2025-04-25 ENCOUNTER — NON-APPOINTMENT (OUTPATIENT)
Age: 35
End: 2025-04-25

## 2025-04-29 ENCOUNTER — OUTPATIENT (OUTPATIENT)
Dept: OUTPATIENT SERVICES | Facility: HOSPITAL | Age: 35
LOS: 1 days | End: 2025-04-29
Payer: MEDICAID

## 2025-04-29 ENCOUNTER — APPOINTMENT (OUTPATIENT)
Age: 35
End: 2025-04-29

## 2025-04-29 DIAGNOSIS — Z98.890 OTHER SPECIFIED POSTPROCEDURAL STATES: Chronic | ICD-10-CM

## 2025-04-29 DIAGNOSIS — D64.9 ANEMIA, UNSPECIFIED: ICD-10-CM

## 2025-04-29 DIAGNOSIS — Z90.3 ACQUIRED ABSENCE OF STOMACH [PART OF]: Chronic | ICD-10-CM

## 2025-04-29 PROCEDURE — 96372 THER/PROPH/DIAG INJ SC/IM: CPT

## 2025-04-29 PROCEDURE — 96365 THER/PROPH/DIAG IV INF INIT: CPT

## 2025-04-29 RX ORDER — IRON SUCROSE 20 MG/ML
200 INJECTION, SOLUTION INTRAVENOUS ONCE
Refills: 0 | Status: COMPLETED | OUTPATIENT
Start: 2025-04-29 | End: 2025-04-29

## 2025-04-29 RX ORDER — CYANOCOBALAMIN 1000 UG/ML
1000 INJECTION INTRAMUSCULAR; SUBCUTANEOUS ONCE
Refills: 0 | Status: COMPLETED | OUTPATIENT
Start: 2025-04-29 | End: 2025-04-29

## 2025-04-29 RX ADMIN — IRON SUCROSE 200 MILLIGRAM(S): 20 INJECTION, SOLUTION INTRAVENOUS at 15:50

## 2025-04-29 RX ADMIN — CYANOCOBALAMIN 1000 MICROGRAM(S): 1000 INJECTION INTRAMUSCULAR; SUBCUTANEOUS at 15:48

## 2025-04-29 RX ADMIN — IRON SUCROSE 200 MILLIGRAM(S): 20 INJECTION, SOLUTION INTRAVENOUS at 16:20

## 2025-05-08 ENCOUNTER — APPOINTMENT (OUTPATIENT)
Age: 35
End: 2025-05-08

## 2025-05-08 ENCOUNTER — OUTPATIENT (OUTPATIENT)
Dept: OUTPATIENT SERVICES | Facility: HOSPITAL | Age: 35
LOS: 1 days | End: 2025-05-08
Payer: MEDICAID

## 2025-05-08 VITALS
OXYGEN SATURATION: 98 % | SYSTOLIC BLOOD PRESSURE: 121 MMHG | HEART RATE: 71 BPM | TEMPERATURE: 99 F | DIASTOLIC BLOOD PRESSURE: 70 MMHG

## 2025-05-08 DIAGNOSIS — D64.9 ANEMIA, UNSPECIFIED: ICD-10-CM

## 2025-05-08 DIAGNOSIS — Z98.890 OTHER SPECIFIED POSTPROCEDURAL STATES: Chronic | ICD-10-CM

## 2025-05-08 DIAGNOSIS — Z90.3 ACQUIRED ABSENCE OF STOMACH [PART OF]: Chronic | ICD-10-CM

## 2025-05-08 PROCEDURE — 96372 THER/PROPH/DIAG INJ SC/IM: CPT

## 2025-05-08 PROCEDURE — 96365 THER/PROPH/DIAG IV INF INIT: CPT

## 2025-05-08 RX ORDER — IRON SUCROSE 20 MG/ML
200 INJECTION, SOLUTION INTRAVENOUS ONCE
Refills: 0 | Status: COMPLETED | OUTPATIENT
Start: 2025-05-08 | End: 2025-05-08

## 2025-05-08 RX ORDER — CYANOCOBALAMIN 1000 UG/ML
1000 INJECTION INTRAMUSCULAR; SUBCUTANEOUS ONCE
Refills: 0 | Status: COMPLETED | OUTPATIENT
Start: 2025-05-08 | End: 2025-05-08

## 2025-05-08 RX ADMIN — CYANOCOBALAMIN 1000 MICROGRAM(S): 1000 INJECTION INTRAMUSCULAR; SUBCUTANEOUS at 14:56

## 2025-05-08 RX ADMIN — IRON SUCROSE 200 MILLIGRAM(S): 20 INJECTION, SOLUTION INTRAVENOUS at 15:30

## 2025-05-08 RX ADMIN — IRON SUCROSE 200 MILLIGRAM(S): 20 INJECTION, SOLUTION INTRAVENOUS at 14:56

## 2025-05-09 DIAGNOSIS — D64.9 ANEMIA, UNSPECIFIED: ICD-10-CM

## 2025-05-15 ENCOUNTER — OUTPATIENT (OUTPATIENT)
Dept: OUTPATIENT SERVICES | Facility: HOSPITAL | Age: 35
LOS: 1 days | End: 2025-05-15
Payer: MEDICAID

## 2025-05-15 ENCOUNTER — APPOINTMENT (OUTPATIENT)
Age: 35
End: 2025-05-15

## 2025-05-15 VITALS
DIASTOLIC BLOOD PRESSURE: 73 MMHG | SYSTOLIC BLOOD PRESSURE: 115 MMHG | TEMPERATURE: 98 F | HEART RATE: 63 BPM | OXYGEN SATURATION: 100 %

## 2025-05-15 DIAGNOSIS — D64.9 ANEMIA, UNSPECIFIED: ICD-10-CM

## 2025-05-15 DIAGNOSIS — Z98.890 OTHER SPECIFIED POSTPROCEDURAL STATES: Chronic | ICD-10-CM

## 2025-05-15 DIAGNOSIS — Z90.3 ACQUIRED ABSENCE OF STOMACH [PART OF]: Chronic | ICD-10-CM

## 2025-05-15 PROCEDURE — 96365 THER/PROPH/DIAG IV INF INIT: CPT

## 2025-05-15 RX ORDER — IRON SUCROSE 20 MG/ML
200 INJECTION, SOLUTION INTRAVENOUS ONCE
Refills: 0 | Status: COMPLETED | OUTPATIENT
Start: 2025-05-15 | End: 2025-05-15

## 2025-05-15 RX ADMIN — IRON SUCROSE 200 MILLIGRAM(S): 20 INJECTION, SOLUTION INTRAVENOUS at 16:20

## 2025-05-15 RX ADMIN — IRON SUCROSE 200 MILLIGRAM(S): 20 INJECTION, SOLUTION INTRAVENOUS at 16:50

## 2025-05-21 ENCOUNTER — NON-APPOINTMENT (OUTPATIENT)
Age: 35
End: 2025-05-21

## 2025-05-30 ENCOUNTER — APPOINTMENT (OUTPATIENT)
Dept: SURGERY | Facility: CLINIC | Age: 35
End: 2025-05-30

## 2025-06-08 PROBLEM — Z80.0 FAMILY HISTORY OF MALIGNANT NEOPLASM OF COLON: Status: ACTIVE | Noted: 2025-06-08

## 2025-06-08 PROBLEM — Z82.49 FAMILY HISTORY OF MYOCARDIAL INFARCTION: Status: ACTIVE | Noted: 2025-06-08

## 2025-06-08 PROBLEM — Z80.0 FAMILY HISTORY OF THROAT CANCER: Status: ACTIVE | Noted: 2025-06-08

## 2025-06-08 PROBLEM — Z81.8 FAMILY HISTORY OF DEMENTIA: Status: ACTIVE | Noted: 2025-06-08

## 2025-06-08 PROBLEM — Z98.84 BARIATRIC SURGERY STATUS: Status: ACTIVE | Noted: 2025-06-08

## 2025-06-08 PROBLEM — Z86.39 HISTORY OF MORBID OBESITY: Status: RESOLVED | Noted: 2020-11-20 | Resolved: 2025-06-08

## 2025-06-12 ENCOUNTER — APPOINTMENT (OUTPATIENT)
Dept: SURGERY | Facility: CLINIC | Age: 35
End: 2025-06-12

## 2025-06-25 ENCOUNTER — APPOINTMENT (OUTPATIENT)
Age: 35
End: 2025-06-25

## 2025-06-27 ENCOUNTER — APPOINTMENT (OUTPATIENT)
Age: 35
End: 2025-06-27

## 2025-06-27 LAB
AUTO BASOPHILS #: 0.03 K/UL
AUTO BASOPHILS %: 0.7 %
AUTO EOSINOPHILS #: 0.04 K/UL
AUTO EOSINOPHILS %: 0.9 %
AUTO IMMATURE GRANULOCYTES #: 0.01 K/UL
AUTO LYMPHOCYTES #: 2 K/UL
AUTO LYMPHOCYTES %: 44.2 %
AUTO MONOCYTES #: 0.48 K/UL
AUTO MONOCYTES %: 10.6 %
AUTO NEUTROPHILS #: 1.97 K/UL
AUTO NEUTROPHILS %: 43.4 %
AUTO NRBC #: 0 K/UL
FERRITIN SERPL-MCNC: 60 NG/ML
FOLATE SERPL-MCNC: 6.6 NG/ML
GGT SERPL-CCNC: 15 U/L
HCT VFR BLD CALC: 36.9 %
HGB BLD-MCNC: 12.2 G/DL
IMM GRANULOCYTES NFR BLD AUTO: 0.2 %
IRON SATN MFR SERPL: 17 %
IRON SERPL-MCNC: 61 UG/DL
MAN DIFF?: NORMAL
MCHC RBC-ENTMCNC: 27.7 PG
MCHC RBC-ENTMCNC: 33.1 G/DL
MCV RBC AUTO: 83.9 FL
PLATELET # BLD AUTO: 189 K/UL
PMV BLD AUTO: 0 /100 WBCS
PMV BLD: 9.9 FL
RBC # BLD: 4.4 M/UL
RBC # FLD: 17.9 %
TIBC SERPL-MCNC: 360 UG/DL
UIBC SERPL-MCNC: 299 UG/DL
WBC # FLD AUTO: 4.53 K/UL

## 2025-08-05 ENCOUNTER — APPOINTMENT (OUTPATIENT)
Dept: SURGERY | Facility: CLINIC | Age: 35
End: 2025-08-05

## 2025-08-21 ENCOUNTER — APPOINTMENT (OUTPATIENT)
Dept: SURGERY | Facility: CLINIC | Age: 35
End: 2025-08-21

## 2025-08-21 VITALS
SYSTOLIC BLOOD PRESSURE: 116 MMHG | WEIGHT: 232 LBS | HEART RATE: 87 BPM | TEMPERATURE: 97 F | HEIGHT: 69.29 IN | BODY MASS INDEX: 33.97 KG/M2 | OXYGEN SATURATION: 99 % | DIASTOLIC BLOOD PRESSURE: 70 MMHG

## 2025-08-26 ENCOUNTER — INPATIENT (INPATIENT)
Facility: HOSPITAL | Age: 35
LOS: 0 days | Discharge: HOME | DRG: 143 | End: 2025-08-27
Attending: THORACIC SURGERY (CARDIOTHORACIC VASCULAR SURGERY) | Admitting: THORACIC SURGERY (CARDIOTHORACIC VASCULAR SURGERY)
Payer: MEDICAID

## 2025-08-26 VITALS
WEIGHT: 225.09 LBS | RESPIRATION RATE: 20 BRPM | DIASTOLIC BLOOD PRESSURE: 85 MMHG | TEMPERATURE: 98 F | SYSTOLIC BLOOD PRESSURE: 121 MMHG | HEIGHT: 72 IN | HEART RATE: 68 BPM | OXYGEN SATURATION: 100 %

## 2025-08-26 DIAGNOSIS — Z90.3 ACQUIRED ABSENCE OF STOMACH [PART OF]: Chronic | ICD-10-CM

## 2025-08-26 DIAGNOSIS — Z98.890 OTHER SPECIFIED POSTPROCEDURAL STATES: Chronic | ICD-10-CM

## 2025-08-26 LAB
ALBUMIN SERPL ELPH-MCNC: 4.9 G/DL — SIGNIFICANT CHANGE UP (ref 3.5–5.2)
ALP SERPL-CCNC: 107 U/L — SIGNIFICANT CHANGE UP (ref 30–115)
ALT FLD-CCNC: 24 U/L — SIGNIFICANT CHANGE UP (ref 0–41)
ANION GAP SERPL CALC-SCNC: 20 MMOL/L — HIGH (ref 7–14)
AST SERPL-CCNC: 28 U/L — SIGNIFICANT CHANGE UP (ref 0–41)
BASOPHILS # BLD AUTO: 0.02 K/UL — SIGNIFICANT CHANGE UP (ref 0–0.2)
BASOPHILS NFR BLD AUTO: 0.2 % — SIGNIFICANT CHANGE UP (ref 0–2)
BILIRUB SERPL-MCNC: 1.2 MG/DL — SIGNIFICANT CHANGE UP (ref 0.2–1.2)
BUN SERPL-MCNC: 11 MG/DL — SIGNIFICANT CHANGE UP (ref 10–20)
CALCIUM SERPL-MCNC: 9.7 MG/DL — SIGNIFICANT CHANGE UP (ref 8.4–10.5)
CHLORIDE SERPL-SCNC: 93 MMOL/L — LOW (ref 98–110)
CO2 SERPL-SCNC: 16 MMOL/L — LOW (ref 17–32)
CREAT SERPL-MCNC: 0.8 MG/DL — SIGNIFICANT CHANGE UP (ref 0.7–1.5)
D DIMER BLD IA.RAPID-MCNC: <150 NG/ML DDU — SIGNIFICANT CHANGE UP
EGFR: 99 ML/MIN/1.73M2 — SIGNIFICANT CHANGE UP
EGFR: 99 ML/MIN/1.73M2 — SIGNIFICANT CHANGE UP
EOSINOPHIL # BLD AUTO: 0.01 K/UL — SIGNIFICANT CHANGE UP (ref 0–0.5)
EOSINOPHIL NFR BLD AUTO: 0.1 % — SIGNIFICANT CHANGE UP (ref 0–6)
GLUCOSE SERPL-MCNC: 158 MG/DL — HIGH (ref 70–99)
HCG SERPL QL: NEGATIVE — SIGNIFICANT CHANGE UP
HCT VFR BLD CALC: 40.8 % — SIGNIFICANT CHANGE UP (ref 34.5–45)
HGB BLD-MCNC: 13.9 G/DL — SIGNIFICANT CHANGE UP (ref 11.5–15.5)
IMM GRANULOCYTES # BLD AUTO: 0.03 K/UL — SIGNIFICANT CHANGE UP (ref 0–0.07)
IMM GRANULOCYTES NFR BLD AUTO: 0.4 % — SIGNIFICANT CHANGE UP (ref 0–0.9)
LYMPHOCYTES # BLD AUTO: 0.99 K/UL — LOW (ref 1–3.3)
LYMPHOCYTES NFR BLD AUTO: 11.7 % — LOW (ref 13–44)
MAGNESIUM SERPL-MCNC: 1.9 MG/DL — SIGNIFICANT CHANGE UP (ref 1.8–2.4)
MCHC RBC-ENTMCNC: 29 PG — SIGNIFICANT CHANGE UP (ref 27–34)
MCHC RBC-ENTMCNC: 34.1 G/DL — SIGNIFICANT CHANGE UP (ref 32–36)
MCV RBC AUTO: 85.2 FL — SIGNIFICANT CHANGE UP (ref 80–100)
MONOCYTES # BLD AUTO: 0.44 K/UL — SIGNIFICANT CHANGE UP (ref 0–0.9)
MONOCYTES NFR BLD AUTO: 5.2 % — SIGNIFICANT CHANGE UP (ref 2–14)
NEUTROPHILS # BLD AUTO: 6.96 K/UL — SIGNIFICANT CHANGE UP (ref 1.8–7.4)
NEUTROPHILS NFR BLD AUTO: 82.4 % — HIGH (ref 43–77)
NRBC # BLD AUTO: 0 K/UL — SIGNIFICANT CHANGE UP (ref 0–0)
NRBC # FLD: 0 K/UL — SIGNIFICANT CHANGE UP (ref 0–0)
NRBC BLD AUTO-RTO: 0 /100 WBCS — SIGNIFICANT CHANGE UP (ref 0–0)
PLATELET # BLD AUTO: 221 K/UL — SIGNIFICANT CHANGE UP (ref 150–400)
PMV BLD: 9.9 FL — SIGNIFICANT CHANGE UP (ref 7–13)
POTASSIUM SERPL-MCNC: 3.9 MMOL/L — SIGNIFICANT CHANGE UP (ref 3.5–5)
POTASSIUM SERPL-SCNC: 3.9 MMOL/L — SIGNIFICANT CHANGE UP (ref 3.5–5)
PROT SERPL-MCNC: 9 G/DL — HIGH (ref 6–8)
RBC # BLD: 4.79 M/UL — SIGNIFICANT CHANGE UP (ref 3.8–5.2)
RBC # FLD: 14 % — SIGNIFICANT CHANGE UP (ref 10.3–14.5)
SODIUM SERPL-SCNC: 129 MMOL/L — LOW (ref 135–146)
TROPONIN T, HIGH SENSITIVITY RESULT: 7 NG/L — SIGNIFICANT CHANGE UP (ref 6–13)
WBC # BLD: 8.45 K/UL — SIGNIFICANT CHANGE UP (ref 3.8–10.5)
WBC # FLD AUTO: 8.45 K/UL — SIGNIFICANT CHANGE UP (ref 3.8–10.5)

## 2025-08-26 PROCEDURE — 93010 ELECTROCARDIOGRAM REPORT: CPT

## 2025-08-26 PROCEDURE — 71046 X-RAY EXAM CHEST 2 VIEWS: CPT | Mod: 26

## 2025-08-26 PROCEDURE — 99285 EMERGENCY DEPT VISIT HI MDM: CPT

## 2025-08-26 RX ORDER — ACETAMINOPHEN 500 MG/5ML
975 LIQUID (ML) ORAL ONCE
Refills: 0 | Status: COMPLETED | OUTPATIENT
Start: 2025-08-26 | End: 2025-08-26

## 2025-08-26 RX ADMIN — Medication 975 MILLIGRAM(S): at 23:59

## 2025-08-27 ENCOUNTER — TRANSCRIPTION ENCOUNTER (OUTPATIENT)
Age: 35
End: 2025-08-27

## 2025-08-27 VITALS — HEART RATE: 72 BPM | DIASTOLIC BLOOD PRESSURE: 70 MMHG | SYSTOLIC BLOOD PRESSURE: 114 MMHG | OXYGEN SATURATION: 99 %

## 2025-08-27 DIAGNOSIS — J98.2 INTERSTITIAL EMPHYSEMA: ICD-10-CM

## 2025-08-27 LAB
ANION GAP SERPL CALC-SCNC: 19 MMOL/L — HIGH (ref 7–14)
BASOPHILS # BLD AUTO: 0 K/UL — SIGNIFICANT CHANGE UP (ref 0–0.2)
BASOPHILS NFR BLD AUTO: 0 % — SIGNIFICANT CHANGE UP (ref 0–1)
BUN SERPL-MCNC: 9 MG/DL — LOW (ref 10–20)
CALCIUM SERPL-MCNC: 9.7 MG/DL — SIGNIFICANT CHANGE UP (ref 8.4–10.5)
CHLORIDE SERPL-SCNC: 98 MMOL/L — SIGNIFICANT CHANGE UP (ref 98–110)
CO2 SERPL-SCNC: 17 MMOL/L — SIGNIFICANT CHANGE UP (ref 17–32)
CREAT SERPL-MCNC: 0.6 MG/DL — LOW (ref 0.7–1.5)
EGFR: 121 ML/MIN/1.73M2 — SIGNIFICANT CHANGE UP
EGFR: 121 ML/MIN/1.73M2 — SIGNIFICANT CHANGE UP
EOSINOPHIL # BLD AUTO: 0 K/UL — SIGNIFICANT CHANGE UP (ref 0–0.7)
EOSINOPHIL NFR BLD AUTO: 0 % — SIGNIFICANT CHANGE UP (ref 0–8)
GLUCOSE SERPL-MCNC: 177 MG/DL — HIGH (ref 70–99)
HCT VFR BLD CALC: 41 % — SIGNIFICANT CHANGE UP (ref 37–47)
HGB BLD-MCNC: 13.8 G/DL — SIGNIFICANT CHANGE UP (ref 12–16)
IMM GRANULOCYTES NFR BLD AUTO: 0.4 % — HIGH (ref 0.1–0.3)
LYMPHOCYTES # BLD AUTO: 0.34 K/UL — LOW (ref 1.2–3.4)
LYMPHOCYTES # BLD AUTO: 6.5 % — LOW (ref 20.5–51.1)
MAGNESIUM SERPL-MCNC: 1.9 MG/DL — SIGNIFICANT CHANGE UP (ref 1.8–2.4)
MCHC RBC-ENTMCNC: 29.1 PG — SIGNIFICANT CHANGE UP (ref 27–31)
MCHC RBC-ENTMCNC: 33.7 G/DL — SIGNIFICANT CHANGE UP (ref 32–37)
MCV RBC AUTO: 86.5 FL — SIGNIFICANT CHANGE UP (ref 81–99)
MONOCYTES # BLD AUTO: 0.07 K/UL — LOW (ref 0.1–0.6)
MONOCYTES NFR BLD AUTO: 1.3 % — LOW (ref 1.7–9.3)
NEUTROPHILS # BLD AUTO: 4.77 K/UL — SIGNIFICANT CHANGE UP (ref 1.4–6.5)
NEUTROPHILS NFR BLD AUTO: 91.8 % — HIGH (ref 42.2–75.2)
NRBC BLD AUTO-RTO: 0 /100 WBCS — SIGNIFICANT CHANGE UP (ref 0–0)
PHOSPHATE SERPL-MCNC: 2.8 MG/DL — SIGNIFICANT CHANGE UP (ref 2.1–4.9)
PLATELET # BLD AUTO: 219 K/UL — SIGNIFICANT CHANGE UP (ref 130–400)
PMV BLD: 10.7 FL — HIGH (ref 7.4–10.4)
POTASSIUM SERPL-MCNC: 4.4 MMOL/L — SIGNIFICANT CHANGE UP (ref 3.5–5)
POTASSIUM SERPL-SCNC: 4.4 MMOL/L — SIGNIFICANT CHANGE UP (ref 3.5–5)
RAPID RVP RESULT: SIGNIFICANT CHANGE UP
RBC # BLD: 4.74 M/UL — SIGNIFICANT CHANGE UP (ref 4.2–5.4)
RBC # FLD: 14 % — SIGNIFICANT CHANGE UP (ref 11.5–14.5)
SARS-COV-2 RNA SPEC QL NAA+PROBE: SIGNIFICANT CHANGE UP
SODIUM SERPL-SCNC: 134 MMOL/L — LOW (ref 135–146)
TROPONIN T, HIGH SENSITIVITY RESULT: <6 NG/L — SIGNIFICANT CHANGE UP (ref 6–13)
WBC # BLD: 5.2 K/UL — SIGNIFICANT CHANGE UP (ref 4.8–10.8)
WBC # FLD AUTO: 5.2 K/UL — SIGNIFICANT CHANGE UP (ref 4.8–10.8)

## 2025-08-27 PROCEDURE — 85025 COMPLETE CBC W/AUTO DIFF WBC: CPT

## 2025-08-27 PROCEDURE — 74220 X-RAY XM ESOPHAGUS 1CNTRST: CPT

## 2025-08-27 PROCEDURE — 74220 X-RAY XM ESOPHAGUS 1CNTRST: CPT | Mod: 26

## 2025-08-27 PROCEDURE — 83735 ASSAY OF MAGNESIUM: CPT

## 2025-08-27 PROCEDURE — 71275 CT ANGIOGRAPHY CHEST: CPT | Mod: 26

## 2025-08-27 PROCEDURE — 84100 ASSAY OF PHOSPHORUS: CPT

## 2025-08-27 PROCEDURE — 99221 1ST HOSP IP/OBS SF/LOW 40: CPT

## 2025-08-27 PROCEDURE — 80048 BASIC METABOLIC PNL TOTAL CA: CPT

## 2025-08-27 PROCEDURE — 0225U NFCT DS DNA&RNA 21 SARSCOV2: CPT

## 2025-08-27 PROCEDURE — 36415 COLL VENOUS BLD VENIPUNCTURE: CPT

## 2025-08-27 RX ORDER — ONDANSETRON HCL/PF 4 MG/2 ML
4 VIAL (ML) INJECTION EVERY 6 HOURS
Refills: 0 | Status: DISCONTINUED | OUTPATIENT
Start: 2025-08-27 | End: 2025-08-27

## 2025-08-27 RX ORDER — ENOXAPARIN SODIUM 100 MG/ML
40 INJECTION SUBCUTANEOUS EVERY 24 HOURS
Refills: 0 | Status: DISCONTINUED | OUTPATIENT
Start: 2025-08-27 | End: 2025-08-27

## 2025-08-27 RX ORDER — METRONIDAZOLE 250 MG
500 TABLET ORAL EVERY 8 HOURS
Refills: 0 | Status: DISCONTINUED | OUTPATIENT
Start: 2025-08-27 | End: 2025-08-27

## 2025-08-27 RX ORDER — DEXAMETHASONE 0.5 MG/1
10 TABLET ORAL ONCE
Refills: 0 | Status: COMPLETED | OUTPATIENT
Start: 2025-08-27 | End: 2025-08-27

## 2025-08-27 RX ORDER — LEVOFLOXACIN 25 MG/ML
1 SOLUTION ORAL
Qty: 7 | Refills: 0
Start: 2025-08-27 | End: 2025-09-02

## 2025-08-27 RX ORDER — CEFTRIAXONE 500 MG/1
1000 INJECTION, POWDER, FOR SOLUTION INTRAMUSCULAR; INTRAVENOUS EVERY 24 HOURS
Refills: 0 | Status: DISCONTINUED | OUTPATIENT
Start: 2025-08-27 | End: 2025-08-27

## 2025-08-27 RX ORDER — ACETAMINOPHEN 500 MG/5ML
1000 LIQUID (ML) ORAL ONCE
Refills: 0 | Status: COMPLETED | OUTPATIENT
Start: 2025-08-27 | End: 2025-08-27

## 2025-08-27 RX ORDER — SODIUM CHLORIDE 9 G/1000ML
1000 INJECTION, SOLUTION INTRAVENOUS
Refills: 0 | Status: DISCONTINUED | OUTPATIENT
Start: 2025-08-27 | End: 2025-08-27

## 2025-08-27 RX ORDER — ALBUTEROL SULFATE 2.5 MG/3ML
2 VIAL, NEBULIZER (ML) INHALATION EVERY 6 HOURS
Refills: 0 | Status: DISCONTINUED | OUTPATIENT
Start: 2025-08-27 | End: 2025-08-27

## 2025-08-27 RX ADMIN — Medication 400 MILLIGRAM(S): at 06:50

## 2025-08-27 RX ADMIN — SODIUM CHLORIDE 140 MILLILITER(S): 9 INJECTION, SOLUTION INTRAVENOUS at 06:01

## 2025-08-27 RX ADMIN — Medication 1000 MILLIGRAM(S): at 07:10

## 2025-08-27 RX ADMIN — DEXAMETHASONE 102 MILLIGRAM(S): 0.5 TABLET ORAL at 00:20

## 2025-08-27 RX ADMIN — CEFTRIAXONE 100 MILLIGRAM(S): 500 INJECTION, POWDER, FOR SOLUTION INTRAMUSCULAR; INTRAVENOUS at 08:18

## 2025-08-27 RX ADMIN — Medication 100 MILLIGRAM(S): at 07:00

## 2025-08-27 RX ADMIN — Medication 1000 MILLILITER(S): at 02:01

## 2025-09-03 DIAGNOSIS — Z87.892 PERSONAL HISTORY OF ANAPHYLAXIS: ICD-10-CM

## 2025-09-03 DIAGNOSIS — Z88.0 ALLERGY STATUS TO PENICILLIN: ICD-10-CM

## 2025-09-03 DIAGNOSIS — Z11.52 ENCOUNTER FOR SCREENING FOR COVID-19: ICD-10-CM

## 2025-09-03 DIAGNOSIS — Z91.018 ALLERGY TO OTHER FOODS: ICD-10-CM

## 2025-09-03 DIAGNOSIS — J93.9 PNEUMOTHORAX, UNSPECIFIED: ICD-10-CM

## 2025-09-03 DIAGNOSIS — J98.2 INTERSTITIAL EMPHYSEMA: ICD-10-CM

## 2025-09-03 DIAGNOSIS — J45.909 UNSPECIFIED ASTHMA, UNCOMPLICATED: ICD-10-CM

## 2025-09-03 DIAGNOSIS — Z98.84 BARIATRIC SURGERY STATUS: ICD-10-CM

## 2025-09-03 DIAGNOSIS — I31.9 DISEASE OF PERICARDIUM, UNSPECIFIED: ICD-10-CM

## 2025-09-09 ENCOUNTER — APPOINTMENT (OUTPATIENT)
Dept: CARDIOTHORACIC SURGERY | Facility: CLINIC | Age: 35
End: 2025-09-09

## 2025-09-10 ENCOUNTER — RESULT REVIEW (OUTPATIENT)
Age: 35
End: 2025-09-10

## 2025-09-16 ENCOUNTER — APPOINTMENT (OUTPATIENT)
Dept: CARDIOTHORACIC SURGERY | Facility: CLINIC | Age: 35
End: 2025-09-16
Payer: MEDICAID

## 2025-09-16 DIAGNOSIS — J98.2 INTERSTITIAL EMPHYSEMA: ICD-10-CM

## 2025-09-16 PROCEDURE — 99212 OFFICE O/P EST SF 10 MIN: CPT | Mod: 93
